# Patient Record
Sex: FEMALE | Race: WHITE | NOT HISPANIC OR LATINO | Employment: OTHER | URBAN - METROPOLITAN AREA
[De-identification: names, ages, dates, MRNs, and addresses within clinical notes are randomized per-mention and may not be internally consistent; named-entity substitution may affect disease eponyms.]

---

## 2017-07-19 ENCOUNTER — CONVERSION ENCOUNTER (OUTPATIENT)
Dept: MAMMOGRAPHY | Facility: HOSPITAL | Age: 72
End: 2017-07-19

## 2018-02-27 ENCOUNTER — OFFICE VISIT CONVERTED (OUTPATIENT)
Dept: FAMILY MEDICINE CLINIC | Facility: CLINIC | Age: 73
End: 2018-02-27
Attending: NURSE PRACTITIONER

## 2018-06-12 ENCOUNTER — CONVERSION ENCOUNTER (OUTPATIENT)
Dept: ORTHOPEDIC SURGERY | Facility: CLINIC | Age: 73
End: 2018-06-12

## 2018-06-12 ENCOUNTER — OFFICE VISIT CONVERTED (OUTPATIENT)
Dept: ORTHOPEDIC SURGERY | Facility: CLINIC | Age: 73
End: 2018-06-12
Attending: PHYSICIAN ASSISTANT

## 2018-07-10 ENCOUNTER — OFFICE VISIT CONVERTED (OUTPATIENT)
Dept: FAMILY MEDICINE CLINIC | Facility: CLINIC | Age: 73
End: 2018-07-10
Attending: NURSE PRACTITIONER

## 2018-08-09 ENCOUNTER — OFFICE VISIT CONVERTED (OUTPATIENT)
Dept: FAMILY MEDICINE CLINIC | Facility: CLINIC | Age: 73
End: 2018-08-09
Attending: NURSE PRACTITIONER

## 2018-08-09 ENCOUNTER — CONVERSION ENCOUNTER (OUTPATIENT)
Dept: FAMILY MEDICINE CLINIC | Facility: CLINIC | Age: 73
End: 2018-08-09

## 2018-10-25 ENCOUNTER — CONVERSION ENCOUNTER (OUTPATIENT)
Dept: MAMMOGRAPHY | Facility: HOSPITAL | Age: 73
End: 2018-10-25

## 2019-05-28 ENCOUNTER — HOSPITAL ENCOUNTER (OUTPATIENT)
Dept: FAMILY MEDICINE CLINIC | Facility: CLINIC | Age: 74
Discharge: HOME | End: 2019-05-28
Attending: NURSE PRACTITIONER

## 2019-05-28 ENCOUNTER — OFFICE VISIT CONVERTED (OUTPATIENT)
Dept: FAMILY MEDICINE CLINIC | Facility: CLINIC | Age: 74
End: 2019-05-28
Attending: NURSE PRACTITIONER

## 2019-05-28 LAB
ALBUMIN SERPL-MCNC: 4.4 G/DL (ref 3.5–5)
ALBUMIN/GLOB SERPL: 1.6 {RATIO} (ref 1.4–2.6)
ALP SERPL-CCNC: 75 U/L (ref 43–160)
ALT SERPL-CCNC: 19 U/L (ref 10–40)
ANION GAP SERPL CALC-SCNC: 20 MMOL/L (ref 8–19)
AST SERPL-CCNC: 23 U/L (ref 15–50)
BASOPHILS # BLD AUTO: 0.05 10*3/UL (ref 0–0.2)
BASOPHILS NFR BLD AUTO: 0.6 % (ref 0–3)
BILIRUB SERPL-MCNC: 0.43 MG/DL (ref 0.2–1.3)
BUN SERPL-MCNC: 17 MG/DL (ref 5–25)
BUN/CREAT SERPL: 22 {RATIO} (ref 6–20)
CALCIUM SERPL-MCNC: 9.3 MG/DL (ref 8.7–10.4)
CHLORIDE SERPL-SCNC: 103 MMOL/L (ref 99–111)
CHOLEST SERPL-MCNC: 174 MG/DL (ref 107–200)
CHOLEST/HDLC SERPL: 3.6 {RATIO} (ref 3–6)
CONV ABS IMM GRAN: 0.03 10*3/UL (ref 0–0.2)
CONV CO2: 23 MMOL/L (ref 22–32)
CONV IMMATURE GRAN: 0.4 % (ref 0–1.8)
CONV TOTAL PROTEIN: 7.1 G/DL (ref 6.3–8.2)
CREAT UR-MCNC: 0.79 MG/DL (ref 0.5–0.9)
DEPRECATED RDW RBC AUTO: 39.8 FL (ref 36.4–46.3)
EOSINOPHIL # BLD AUTO: 0.28 10*3/UL (ref 0–0.7)
EOSINOPHIL # BLD AUTO: 3.5 % (ref 0–7)
ERYTHROCYTE [DISTWIDTH] IN BLOOD BY AUTOMATED COUNT: 12 % (ref 11.7–14.4)
GFR SERPLBLD BASED ON 1.73 SQ M-ARVRAT: >60 ML/MIN/{1.73_M2}
GLOBULIN UR ELPH-MCNC: 2.7 G/DL (ref 2–3.5)
GLUCOSE SERPL-MCNC: 104 MG/DL (ref 65–99)
HBA1C MFR BLD: 14.1 G/DL (ref 12–16)
HCT VFR BLD AUTO: 43.3 % (ref 37–47)
HDLC SERPL-MCNC: 48 MG/DL (ref 40–60)
LDLC SERPL CALC-MCNC: 102 MG/DL (ref 70–100)
LYMPHOCYTES # BLD AUTO: 2.76 10*3/UL (ref 1–5)
MCH RBC QN AUTO: 29.3 PG (ref 27–31)
MCHC RBC AUTO-ENTMCNC: 32.6 G/DL (ref 33–37)
MCV RBC AUTO: 90 FL (ref 81–99)
MONOCYTES # BLD AUTO: 0.59 10*3/UL (ref 0.2–1.2)
MONOCYTES NFR BLD AUTO: 7.3 % (ref 3–10)
NEUTROPHILS # BLD AUTO: 4.37 10*3/UL (ref 2–8)
NEUTROPHILS NFR BLD AUTO: 54 % (ref 30–85)
NRBC CBCN: 0 % (ref 0–0.7)
OSMOLALITY SERPL CALC.SUM OF ELEC: 296 MOSM/KG (ref 273–304)
PLATELET # BLD AUTO: 232 10*3/UL (ref 130–400)
PMV BLD AUTO: 11.4 FL (ref 9.4–12.3)
POTASSIUM SERPL-SCNC: 4.1 MMOL/L (ref 3.5–5.3)
RBC # BLD AUTO: 4.81 10*6/UL (ref 4.2–5.4)
SODIUM SERPL-SCNC: 142 MMOL/L (ref 135–147)
TRIGL SERPL-MCNC: 122 MG/DL (ref 40–150)
VARIANT LYMPHS NFR BLD MANUAL: 34.2 % (ref 20–45)
VLDLC SERPL-MCNC: 24 MG/DL (ref 5–37)
WBC # BLD AUTO: 8.08 10*3/UL (ref 4.8–10.8)

## 2019-06-10 ENCOUNTER — OFFICE VISIT CONVERTED (OUTPATIENT)
Dept: FAMILY MEDICINE CLINIC | Facility: CLINIC | Age: 74
End: 2019-06-10
Attending: NURSE PRACTITIONER

## 2019-06-10 ENCOUNTER — CONVERSION ENCOUNTER (OUTPATIENT)
Dept: FAMILY MEDICINE CLINIC | Facility: CLINIC | Age: 74
End: 2019-06-10

## 2019-06-24 ENCOUNTER — HOSPITAL ENCOUNTER (OUTPATIENT)
Dept: URGENT CARE | Facility: CLINIC | Age: 74
Discharge: HOME | End: 2019-06-24
Attending: FAMILY MEDICINE

## 2019-06-27 LAB
AMOXICILLIN+CLAV SUSC ISLT: 4
AMPICILLIN SUSC ISLT: >=32
AMPICILLIN+SULBAC SUSC ISLT: >=32
BACTERIA UR CULT: ABNORMAL
CEFAZOLIN SUSC ISLT: <=4
CEFEPIME SUSC ISLT: <=1
CEFTAZIDIME SUSC ISLT: <=1
CEFTRIAXONE SUSC ISLT: <=1
CEFUROXIME ORAL SUSC ISLT: 16
CEFUROXIME PARENTER SUSC ISLT: 16
CIPROFLOXACIN SUSC ISLT: <=0.25
ERTAPENEM SUSC ISLT: <=0.5
GENTAMICIN SUSC ISLT: <=1
LEVOFLOXACIN SUSC ISLT: <=0.12
NITROFURANTOIN SUSC ISLT: <=16
TETRACYCLINE SUSC ISLT: 2
TMP SMX SUSC ISLT: >=320
TOBRAMYCIN SUSC ISLT: <=1

## 2019-07-09 ENCOUNTER — OFFICE VISIT CONVERTED (OUTPATIENT)
Dept: FAMILY MEDICINE CLINIC | Facility: CLINIC | Age: 74
End: 2019-07-09
Attending: NURSE PRACTITIONER

## 2019-07-10 ENCOUNTER — HOSPITAL ENCOUNTER (OUTPATIENT)
Dept: FAMILY MEDICINE CLINIC | Facility: CLINIC | Age: 74
Discharge: HOME | End: 2019-07-10
Attending: NURSE PRACTITIONER

## 2019-07-10 LAB
APPEARANCE UR: CLEAR
BILIRUB UR QL: NEGATIVE
COLOR UR: YELLOW
CONV BACTERIA: NEGATIVE
CONV COLLECTION SOURCE (UA): ABNORMAL
CONV UROBILINOGEN IN URINE BY AUTOMATED TEST STRIP: 0.2 {EHRLICHU}/DL (ref 0.1–1)
GLUCOSE UR QL: NEGATIVE MG/DL
HGB UR QL STRIP: NEGATIVE
KETONES UR QL STRIP: NEGATIVE MG/DL
LEUKOCYTE ESTERASE UR QL STRIP: ABNORMAL
NITRITE UR QL STRIP: NEGATIVE
PH UR STRIP.AUTO: 5.5 [PH] (ref 5–8)
PROT UR QL: NEGATIVE MG/DL
RBC #/AREA URNS HPF: ABNORMAL /[HPF]
SP GR UR: 1.01 (ref 1–1.03)
WBC #/AREA URNS HPF: ABNORMAL /[HPF]

## 2019-07-15 ENCOUNTER — OFFICE VISIT CONVERTED (OUTPATIENT)
Dept: FAMILY MEDICINE CLINIC | Facility: CLINIC | Age: 74
End: 2019-07-15
Attending: NURSE PRACTITIONER

## 2019-07-18 ENCOUNTER — HOSPITAL ENCOUNTER (OUTPATIENT)
Dept: GENERAL RADIOLOGY | Facility: HOSPITAL | Age: 74
Discharge: HOME | End: 2019-07-18
Attending: NURSE PRACTITIONER

## 2019-07-22 ENCOUNTER — OFFICE VISIT CONVERTED (OUTPATIENT)
Dept: FAMILY MEDICINE CLINIC | Facility: CLINIC | Age: 74
End: 2019-07-22
Attending: NURSE PRACTITIONER

## 2019-09-12 ENCOUNTER — OFFICE VISIT CONVERTED (OUTPATIENT)
Dept: ORTHOPEDIC SURGERY | Facility: CLINIC | Age: 74
End: 2019-09-12
Attending: ORTHOPAEDIC SURGERY

## 2019-09-12 ENCOUNTER — CONVERSION ENCOUNTER (OUTPATIENT)
Dept: ORTHOPEDIC SURGERY | Facility: CLINIC | Age: 74
End: 2019-09-12

## 2019-10-15 ENCOUNTER — HOSPITAL ENCOUNTER (OUTPATIENT)
Dept: FAMILY MEDICINE CLINIC | Facility: CLINIC | Age: 74
Discharge: HOME | End: 2019-10-15
Attending: NURSE PRACTITIONER

## 2019-10-15 ENCOUNTER — OFFICE VISIT CONVERTED (OUTPATIENT)
Dept: FAMILY MEDICINE CLINIC | Facility: CLINIC | Age: 74
End: 2019-10-15
Attending: NURSE PRACTITIONER

## 2019-10-15 LAB
ALBUMIN SERPL-MCNC: 4.8 G/DL (ref 3.5–5)
ALBUMIN/GLOB SERPL: 1.6 {RATIO} (ref 1.4–2.6)
ALP SERPL-CCNC: 82 U/L (ref 43–160)
ALT SERPL-CCNC: 21 U/L (ref 10–40)
ANION GAP SERPL CALC-SCNC: 21 MMOL/L (ref 8–19)
AST SERPL-CCNC: 24 U/L (ref 15–50)
BASOPHILS # BLD AUTO: 0.05 10*3/UL (ref 0–0.2)
BASOPHILS NFR BLD AUTO: 0.5 % (ref 0–3)
BILIRUB SERPL-MCNC: 0.38 MG/DL (ref 0.2–1.3)
BUN SERPL-MCNC: 12 MG/DL (ref 5–25)
BUN/CREAT SERPL: 19 {RATIO} (ref 6–20)
CALCIUM SERPL-MCNC: 10 MG/DL (ref 8.7–10.4)
CHLORIDE SERPL-SCNC: 98 MMOL/L (ref 99–111)
CHOLEST SERPL-MCNC: 195 MG/DL (ref 107–200)
CHOLEST/HDLC SERPL: 3.9 {RATIO} (ref 3–6)
CONV ABS IMM GRAN: 0.05 10*3/UL (ref 0–0.2)
CONV CO2: 25 MMOL/L (ref 22–32)
CONV IMMATURE GRAN: 0.5 % (ref 0–1.8)
CONV TOTAL PROTEIN: 7.8 G/DL (ref 6.3–8.2)
CREAT UR-MCNC: 0.64 MG/DL (ref 0.5–0.9)
DEPRECATED RDW RBC AUTO: 40.5 FL (ref 36.4–46.3)
EOSINOPHIL # BLD AUTO: 0.2 10*3/UL (ref 0–0.7)
EOSINOPHIL # BLD AUTO: 2.2 % (ref 0–7)
ERYTHROCYTE [DISTWIDTH] IN BLOOD BY AUTOMATED COUNT: 12.6 % (ref 11.7–14.4)
GFR SERPLBLD BASED ON 1.73 SQ M-ARVRAT: >60 ML/MIN/{1.73_M2}
GLOBULIN UR ELPH-MCNC: 3 G/DL (ref 2–3.5)
GLUCOSE SERPL-MCNC: 92 MG/DL (ref 65–99)
HCT VFR BLD AUTO: 43 % (ref 37–47)
HDLC SERPL-MCNC: 50 MG/DL (ref 40–60)
HGB BLD-MCNC: 14.4 G/DL (ref 12–16)
LDLC SERPL CALC-MCNC: 100 MG/DL (ref 70–100)
LYMPHOCYTES # BLD AUTO: 3 10*3/UL (ref 1–5)
LYMPHOCYTES NFR BLD AUTO: 32.9 % (ref 20–45)
MCH RBC QN AUTO: 29.6 PG (ref 27–31)
MCHC RBC AUTO-ENTMCNC: 33.5 G/DL (ref 33–37)
MCV RBC AUTO: 88.3 FL (ref 81–99)
MONOCYTES # BLD AUTO: 0.58 10*3/UL (ref 0.2–1.2)
MONOCYTES NFR BLD AUTO: 6.4 % (ref 3–10)
NEUTROPHILS # BLD AUTO: 5.24 10*3/UL (ref 2–8)
NEUTROPHILS NFR BLD AUTO: 57.5 % (ref 30–85)
NRBC CBCN: 0 % (ref 0–0.7)
OSMOLALITY SERPL CALC.SUM OF ELEC: 289 MOSM/KG (ref 273–304)
PLATELET # BLD AUTO: 268 10*3/UL (ref 130–400)
PMV BLD AUTO: 10.7 FL (ref 9.4–12.3)
POTASSIUM SERPL-SCNC: 4.1 MMOL/L (ref 3.5–5.3)
RBC # BLD AUTO: 4.87 10*6/UL (ref 4.2–5.4)
SODIUM SERPL-SCNC: 140 MMOL/L (ref 135–147)
T4 FREE SERPL-MCNC: 1.1 NG/DL (ref 0.9–1.8)
TRIGL SERPL-MCNC: 225 MG/DL (ref 40–150)
TSH SERPL-ACNC: 1.81 M[IU]/L (ref 0.27–4.2)
VLDLC SERPL-MCNC: 45 MG/DL (ref 5–37)
WBC # BLD AUTO: 9.12 10*3/UL (ref 4.8–10.8)

## 2019-10-16 LAB
CONV HEPATITIS C AB WITH REFLEX TO CONFIRMATION: 0.1 S/CO RATIO (ref 0–0.9)
CONV HEPATITIS COMMENT: NORMAL

## 2020-04-23 ENCOUNTER — TELEPHONE CONVERTED (OUTPATIENT)
Dept: FAMILY MEDICINE CLINIC | Facility: CLINIC | Age: 75
End: 2020-04-23
Attending: NURSE PRACTITIONER

## 2020-05-01 ENCOUNTER — HOSPITAL ENCOUNTER (OUTPATIENT)
Dept: FAMILY MEDICINE CLINIC | Facility: CLINIC | Age: 75
Discharge: HOME | End: 2020-05-01
Attending: NURSE PRACTITIONER

## 2020-05-01 LAB
ALBUMIN SERPL-MCNC: 4.4 G/DL (ref 3.5–5)
ALBUMIN/GLOB SERPL: 1.6 {RATIO} (ref 1.4–2.6)
ALP SERPL-CCNC: 71 U/L (ref 43–160)
ALT SERPL-CCNC: 18 U/L (ref 10–40)
ANION GAP SERPL CALC-SCNC: 20 MMOL/L (ref 8–19)
AST SERPL-CCNC: 20 U/L (ref 15–50)
BASOPHILS # BLD AUTO: 0.04 10*3/UL (ref 0–0.2)
BASOPHILS NFR BLD AUTO: 0.5 % (ref 0–3)
BILIRUB SERPL-MCNC: 0.38 MG/DL (ref 0.2–1.3)
BUN SERPL-MCNC: 17 MG/DL (ref 5–25)
BUN/CREAT SERPL: 28 {RATIO} (ref 6–20)
CALCIUM SERPL-MCNC: 9.8 MG/DL (ref 8.7–10.4)
CHLORIDE SERPL-SCNC: 102 MMOL/L (ref 99–111)
CHOLEST SERPL-MCNC: 170 MG/DL (ref 107–200)
CHOLEST/HDLC SERPL: 3.1 {RATIO} (ref 3–6)
CONV ABS IMM GRAN: 0.04 10*3/UL (ref 0–0.2)
CONV CO2: 24 MMOL/L (ref 22–32)
CONV IMMATURE GRAN: 0.5 % (ref 0–1.8)
CONV TOTAL PROTEIN: 7.2 G/DL (ref 6.3–8.2)
CREAT UR-MCNC: 0.61 MG/DL (ref 0.5–0.9)
DEPRECATED RDW RBC AUTO: 40.5 FL (ref 36.4–46.3)
EOSINOPHIL # BLD AUTO: 0.28 10*3/UL (ref 0–0.7)
EOSINOPHIL # BLD AUTO: 3.6 % (ref 0–7)
ERYTHROCYTE [DISTWIDTH] IN BLOOD BY AUTOMATED COUNT: 12.2 % (ref 11.7–14.4)
GFR SERPLBLD BASED ON 1.73 SQ M-ARVRAT: >60 ML/MIN/{1.73_M2}
GLOBULIN UR ELPH-MCNC: 2.8 G/DL (ref 2–3.5)
GLUCOSE SERPL-MCNC: 107 MG/DL (ref 65–99)
HCT VFR BLD AUTO: 42.4 % (ref 37–47)
HDLC SERPL-MCNC: 54 MG/DL (ref 40–60)
HGB BLD-MCNC: 13.9 G/DL (ref 12–16)
LDLC SERPL CALC-MCNC: 94 MG/DL (ref 70–100)
LYMPHOCYTES # BLD AUTO: 2.29 10*3/UL (ref 1–5)
LYMPHOCYTES NFR BLD AUTO: 29.2 % (ref 20–45)
MCH RBC QN AUTO: 29.3 PG (ref 27–31)
MCHC RBC AUTO-ENTMCNC: 32.8 G/DL (ref 33–37)
MCV RBC AUTO: 89.5 FL (ref 81–99)
MONOCYTES # BLD AUTO: 0.6 10*3/UL (ref 0.2–1.2)
MONOCYTES NFR BLD AUTO: 7.7 % (ref 3–10)
NEUTROPHILS # BLD AUTO: 4.58 10*3/UL (ref 2–8)
NEUTROPHILS NFR BLD AUTO: 58.5 % (ref 30–85)
NRBC CBCN: 0 % (ref 0–0.7)
OSMOLALITY SERPL CALC.SUM OF ELEC: 296 MOSM/KG (ref 273–304)
PLATELET # BLD AUTO: 251 10*3/UL (ref 130–400)
PMV BLD AUTO: 11 FL (ref 9.4–12.3)
POTASSIUM SERPL-SCNC: 4 MMOL/L (ref 3.5–5.3)
RBC # BLD AUTO: 4.74 10*6/UL (ref 4.2–5.4)
SODIUM SERPL-SCNC: 142 MMOL/L (ref 135–147)
TRIGL SERPL-MCNC: 110 MG/DL (ref 40–150)
VLDLC SERPL-MCNC: 22 MG/DL (ref 5–37)
WBC # BLD AUTO: 7.83 10*3/UL (ref 4.8–10.8)

## 2020-05-12 ENCOUNTER — OFFICE VISIT CONVERTED (OUTPATIENT)
Dept: ORTHOPEDIC SURGERY | Facility: CLINIC | Age: 75
End: 2020-05-12
Attending: PHYSICIAN ASSISTANT

## 2020-06-24 ENCOUNTER — HOSPITAL ENCOUNTER (OUTPATIENT)
Dept: GENERAL RADIOLOGY | Facility: HOSPITAL | Age: 75
Discharge: HOME | End: 2020-06-24
Attending: NURSE PRACTITIONER

## 2020-06-30 LAB — SARS-COV-2 RNA SPEC QL NAA+PROBE: NOT DETECTED

## 2020-07-01 ENCOUNTER — HOSPITAL ENCOUNTER (OUTPATIENT)
Dept: PERIOP | Facility: HOSPITAL | Age: 75
Setting detail: HOSPITAL OUTPATIENT SURGERY
Discharge: HOME | End: 2020-07-01
Attending: ORTHOPAEDIC SURGERY

## 2020-07-08 ENCOUNTER — HOSPITAL ENCOUNTER (OUTPATIENT)
Dept: URGENT CARE | Facility: CLINIC | Age: 75
Discharge: HOME | End: 2020-07-08

## 2020-07-11 LAB
AMOXICILLIN+CLAV SUSC ISLT: 4
AMPICILLIN SUSC ISLT: 8
AMPICILLIN+SULBAC SUSC ISLT: 4
BACTERIA UR CULT: ABNORMAL
CEFAZOLIN SUSC ISLT: <=4
CEFEPIME SUSC ISLT: <=1
CEFTAZIDIME SUSC ISLT: <=1
CEFTRIAXONE SUSC ISLT: <=1
CEFUROXIME ORAL SUSC ISLT: 16
CEFUROXIME PARENTER SUSC ISLT: 16
CIPROFLOXACIN SUSC ISLT: 1
ERTAPENEM SUSC ISLT: <=0.5
GENTAMICIN SUSC ISLT: <=1
LEVOFLOXACIN SUSC ISLT: 1
NITROFURANTOIN SUSC ISLT: <=16
TETRACYCLINE SUSC ISLT: <=1
TMP SMX SUSC ISLT: <=20
TOBRAMYCIN SUSC ISLT: <=1

## 2020-07-16 ENCOUNTER — OFFICE VISIT CONVERTED (OUTPATIENT)
Dept: ORTHOPEDIC SURGERY | Facility: CLINIC | Age: 75
End: 2020-07-16
Attending: PHYSICIAN ASSISTANT

## 2020-07-16 ENCOUNTER — CONVERSION ENCOUNTER (OUTPATIENT)
Dept: ORTHOPEDIC SURGERY | Facility: CLINIC | Age: 75
End: 2020-07-16

## 2020-08-18 ENCOUNTER — OFFICE VISIT CONVERTED (OUTPATIENT)
Dept: ORTHOPEDIC SURGERY | Facility: CLINIC | Age: 75
End: 2020-08-18
Attending: PHYSICIAN ASSISTANT

## 2020-09-18 ENCOUNTER — OFFICE VISIT CONVERTED (OUTPATIENT)
Dept: ORTHOPEDIC SURGERY | Facility: CLINIC | Age: 75
End: 2020-09-18
Attending: PHYSICIAN ASSISTANT

## 2020-10-08 ENCOUNTER — HOSPITAL ENCOUNTER (OUTPATIENT)
Dept: FAMILY MEDICINE CLINIC | Facility: CLINIC | Age: 75
Discharge: HOME | End: 2020-10-08
Attending: NURSE PRACTITIONER

## 2020-10-08 LAB
ALBUMIN SERPL-MCNC: 4.3 G/DL (ref 3.5–5)
ALBUMIN/GLOB SERPL: 1.5 {RATIO} (ref 1.4–2.6)
ALP SERPL-CCNC: 85 U/L (ref 43–160)
ALT SERPL-CCNC: 18 U/L (ref 10–40)
ANION GAP SERPL CALC-SCNC: 16 MMOL/L (ref 8–19)
AST SERPL-CCNC: 19 U/L (ref 15–50)
BASOPHILS # BLD AUTO: 0.07 10*3/UL (ref 0–0.2)
BASOPHILS NFR BLD AUTO: 0.6 % (ref 0–3)
BILIRUB SERPL-MCNC: 0.55 MG/DL (ref 0.2–1.3)
BUN SERPL-MCNC: 14 MG/DL (ref 5–25)
BUN/CREAT SERPL: 18 {RATIO} (ref 6–20)
CALCIUM SERPL-MCNC: 10.1 MG/DL (ref 8.7–10.4)
CHLORIDE SERPL-SCNC: 103 MMOL/L (ref 99–111)
CHOLEST SERPL-MCNC: 183 MG/DL (ref 107–200)
CHOLEST/HDLC SERPL: 2.8 {RATIO} (ref 3–6)
CONV ABS IMM GRAN: 0.05 10*3/UL (ref 0–0.2)
CONV CO2: 24 MMOL/L (ref 22–32)
CONV IMMATURE GRAN: 0.4 % (ref 0–1.8)
CONV TOTAL PROTEIN: 7.2 G/DL (ref 6.3–8.2)
CREAT UR-MCNC: 0.77 MG/DL (ref 0.5–0.9)
DEPRECATED RDW RBC AUTO: 41.3 FL (ref 36.4–46.3)
EOSINOPHIL # BLD AUTO: 0.18 10*3/UL (ref 0–0.7)
EOSINOPHIL # BLD AUTO: 1.6 % (ref 0–7)
ERYTHROCYTE [DISTWIDTH] IN BLOOD BY AUTOMATED COUNT: 12.5 % (ref 11.7–14.4)
GFR SERPLBLD BASED ON 1.73 SQ M-ARVRAT: >60 ML/MIN/{1.73_M2}
GLOBULIN UR ELPH-MCNC: 2.9 G/DL (ref 2–3.5)
GLUCOSE SERPL-MCNC: 98 MG/DL (ref 65–99)
HCT VFR BLD AUTO: 44.5 % (ref 37–47)
HDLC SERPL-MCNC: 65 MG/DL (ref 40–60)
HGB BLD-MCNC: 14.5 G/DL (ref 12–16)
LDLC SERPL CALC-MCNC: 97 MG/DL (ref 70–100)
LYMPHOCYTES # BLD AUTO: 2.74 10*3/UL (ref 1–5)
LYMPHOCYTES NFR BLD AUTO: 24.3 % (ref 20–45)
MCH RBC QN AUTO: 29.4 PG (ref 27–31)
MCHC RBC AUTO-ENTMCNC: 32.6 G/DL (ref 33–37)
MCV RBC AUTO: 90.3 FL (ref 81–99)
MONOCYTES # BLD AUTO: 0.72 10*3/UL (ref 0.2–1.2)
MONOCYTES NFR BLD AUTO: 6.4 % (ref 3–10)
NEUTROPHILS # BLD AUTO: 7.53 10*3/UL (ref 2–8)
NEUTROPHILS NFR BLD AUTO: 66.7 % (ref 30–85)
NRBC CBCN: 0 % (ref 0–0.7)
OSMOLALITY SERPL CALC.SUM OF ELEC: 288 MOSM/KG (ref 273–304)
PLATELET # BLD AUTO: 255 10*3/UL (ref 130–400)
PMV BLD AUTO: 10.8 FL (ref 9.4–12.3)
POTASSIUM SERPL-SCNC: 4.2 MMOL/L (ref 3.5–5.3)
RBC # BLD AUTO: 4.93 10*6/UL (ref 4.2–5.4)
SODIUM SERPL-SCNC: 139 MMOL/L (ref 135–147)
TRIGL SERPL-MCNC: 107 MG/DL (ref 40–150)
TSH SERPL-ACNC: 2.14 M[IU]/L (ref 0.27–4.2)
VLDLC SERPL-MCNC: 21 MG/DL (ref 5–37)
WBC # BLD AUTO: 11.29 10*3/UL (ref 4.8–10.8)

## 2020-10-21 ENCOUNTER — CONVERSION ENCOUNTER (OUTPATIENT)
Dept: FAMILY MEDICINE CLINIC | Facility: CLINIC | Age: 75
End: 2020-10-21

## 2020-10-21 ENCOUNTER — OFFICE VISIT CONVERTED (OUTPATIENT)
Dept: FAMILY MEDICINE CLINIC | Facility: CLINIC | Age: 75
End: 2020-10-21
Attending: NURSE PRACTITIONER

## 2020-10-29 ENCOUNTER — CONVERSION ENCOUNTER (OUTPATIENT)
Dept: FAMILY MEDICINE CLINIC | Facility: CLINIC | Age: 75
End: 2020-10-29

## 2020-10-31 ENCOUNTER — HOSPITAL ENCOUNTER (OUTPATIENT)
Dept: URGENT CARE | Facility: CLINIC | Age: 75
Discharge: HOME | End: 2020-10-31

## 2020-11-02 ENCOUNTER — OFFICE VISIT CONVERTED (OUTPATIENT)
Dept: FAMILY MEDICINE CLINIC | Facility: CLINIC | Age: 75
End: 2020-11-02
Attending: NURSE PRACTITIONER

## 2020-11-02 ENCOUNTER — HOSPITAL ENCOUNTER (OUTPATIENT)
Dept: FAMILY MEDICINE CLINIC | Facility: CLINIC | Age: 75
Discharge: HOME | End: 2020-11-02
Attending: NURSE PRACTITIONER

## 2020-11-04 ENCOUNTER — CONVERSION ENCOUNTER (OUTPATIENT)
Dept: FAMILY MEDICINE CLINIC | Facility: CLINIC | Age: 75
End: 2020-11-04

## 2020-11-04 LAB — BACTERIA UR CULT: NORMAL

## 2020-11-25 ENCOUNTER — PATIENT OUTREACH - CONVERTED (OUTPATIENT)
Dept: FAMILY MEDICINE CLINIC | Facility: CLINIC | Age: 75
End: 2020-11-25
Attending: NURSE PRACTITIONER

## 2020-12-31 ENCOUNTER — PATIENT OUTREACH - CONVERTED (OUTPATIENT)
Dept: FAMILY MEDICINE CLINIC | Facility: CLINIC | Age: 75
End: 2020-12-31
Attending: NURSE PRACTITIONER

## 2021-01-11 ENCOUNTER — TELEPHONE CONVERTED (OUTPATIENT)
Dept: FAMILY MEDICINE CLINIC | Facility: CLINIC | Age: 76
End: 2021-01-11
Attending: NURSE PRACTITIONER

## 2021-01-26 ENCOUNTER — PATIENT OUTREACH - CONVERTED (OUTPATIENT)
Dept: FAMILY MEDICINE CLINIC | Facility: CLINIC | Age: 76
End: 2021-01-26
Attending: NURSE PRACTITIONER

## 2021-02-25 ENCOUNTER — PATIENT OUTREACH - CONVERTED (OUTPATIENT)
Dept: FAMILY MEDICINE CLINIC | Facility: CLINIC | Age: 76
End: 2021-02-25
Attending: NURSE PRACTITIONER

## 2021-03-31 ENCOUNTER — PATIENT OUTREACH - CONVERTED (OUTPATIENT)
Dept: FAMILY MEDICINE CLINIC | Facility: CLINIC | Age: 76
End: 2021-03-31
Attending: NURSE PRACTITIONER

## 2021-04-22 ENCOUNTER — CONVERSION ENCOUNTER (OUTPATIENT)
Dept: ORTHOPEDIC SURGERY | Facility: CLINIC | Age: 76
End: 2021-04-22

## 2021-04-22 ENCOUNTER — OFFICE VISIT CONVERTED (OUTPATIENT)
Dept: ORTHOPEDIC SURGERY | Facility: CLINIC | Age: 76
End: 2021-04-22
Attending: ORTHOPAEDIC SURGERY

## 2021-04-30 ENCOUNTER — PATIENT OUTREACH - CONVERTED (OUTPATIENT)
Dept: FAMILY MEDICINE CLINIC | Facility: CLINIC | Age: 76
End: 2021-04-30
Attending: NURSE PRACTITIONER

## 2021-05-05 ENCOUNTER — OFFICE VISIT CONVERTED (OUTPATIENT)
Dept: FAMILY MEDICINE CLINIC | Facility: CLINIC | Age: 76
End: 2021-05-05
Attending: NURSE PRACTITIONER

## 2021-05-05 ENCOUNTER — CONVERSION ENCOUNTER (OUTPATIENT)
Dept: FAMILY MEDICINE CLINIC | Facility: CLINIC | Age: 76
End: 2021-05-05

## 2021-05-10 NOTE — OUTREACH NOTE
Quick Note      Patient Name: Yeimy Yang   Patient ID: [id number]   Sex: Female   YOB: 1945    Primary Care Provider: Anna BETANCOURT   Referring Provider: Anna BETANCOURT    Visit Date: February 25, 2021    Provider: SERAFIN Gross   Location: [facility]   Location Address: [address] [place] [address]  [id number]   Location Phone: [phone]          History Of Present Illness     CCM     TaskID: [id number]    Task Subject: CCM notes February 2021    Task Comments:    Date: Feb 25 2021 12:08PM     Creator: karel  Called patient to inform what PCP said about the whooping cough vaccine, she does not feel they need it but will let me know if they get one so I can update their charts. She is aware to call pharmacy first to be sure it would be covered if they go get them. (6 minutes)    Date: Feb 24 2021  3:26PM     Creator: karel  [facility] Plan of Care and goals updated for the month. (charting 8 minutes)    Date: Feb 24 2021  3:04PM     Creator: karel  Pritned patient orders for lab work for PCP for April and faxed to the PCP in [place], Dr Correa per her request so they do not have to get them done when get home as they are coming in first of April. (6 minutes)    Date: Feb 24 2021  2:54PM     Creator: karel  Task to PCP about the whooping cough vaccine she was asking about due to insurance calling her about it. (2 minutes)    Date: Feb 24 2021  2:53PM     Creator: karel  I recorded her blood pressure readings and scanned for PCP to review in her chart. 114-1120's over 70's to low 80's and heart rate average in 70s.(5 minutes)    Date: Feb 24 2021  2:46PM     Creator: karel  Called patient for CCM call. She is doing well, the weather is in the 80s today in [place] and should get up to 90 by weeks end. She reports she is going to go with Franklyn to get blood work at the doctor in [place] so they do not have to do it at home when they  return in April. She reports her blood pressures are great and she gave me end of Jan and some of February readings. I will scan for PCP to review. Medications reviewed and same. Trying to get the COVID 19 vaccine in [place] at the [facility] but have not been called yet for their appointments. She will try another route. Overall everything is good. Plans to return to [place] first of April and ride with juancho. (8 minutes)    Date: Feb 24 2021  2:40PM     Creator: karel  Chart review for February noted she has follow up with PCP 5/5/2021, there is a note from insurance for review of medication compliance for HCTZ 12.5mg with last fill 9/2020 but she was changed to 25mg and then the [place] doctor stopped the medication 11/18/2020. No call needed back to them. (7 minutes)             Plan  · Medications  o Medications have been Reconciled  o Transition of Care or Provider Policy            Electronically Signed by: Zonia Rivas RN -Author on February 25, 2021 12:12:55 PM

## 2021-05-10 NOTE — OUTREACH NOTE
Quick Note      Patient Name: Yeimy Yang   Patient ID: [id number]   Sex: Female   YOB: 1945    Primary Care Provider: Anna BETANCOURT   Referring Provider: Anna BETANCOURT    Visit Date: November 25, 2020    Provider: SERAFIN Gross   Location: [facility]   Location Address: [address] [place] [address]  [id number]   Location Phone: [phone]          History Of Present Illness  CCM Comprehensive Care Plan    This Chronic Medical Management Care Plan for Yeimy Yang, 75 year old /White female, has been monitored and managed, reviewed, and a new plan of care implemented, and established for the month of November. A cumulative time of 60 minutes was spent on this patient record, including phone call with patient, electronic communication with primary care provider, electronic communication with other providers, and chart review.   Regarding the patient's diagnoses Essential hypertension, Fatigue, Hyperlipidemia, and Anxiety Stroke, the following items were adressed: medical records, medications, and changes to medical care and any changes can be found within the plan section of the note. A detailed listing of time spent for chronic care management has been scanned into the patient's electronic record. Current medications include: amlodipine 2.5 mg oral tablet, Aspir-81 81 mg Oral Tablet, Delayed Release (E.C.), B12 lozenge, cetirizine 10 mg oral tablet, Estrace 0.01 % (0.1 mg/gram) vaginal cream, hydrocortisone acetate 25 mg rectal suppository, losartan 50 mg oral tablet, multivitamin oral tablet, propranolol 20 mg oral tablet, simvastatin 10 mg oral tablet, Singulair 10 mg oral tablet, Vitamin D3 5,000 unit oral tablet, Voltaren 1 % topical gel, and Zoloft 50 mg oral tablet and the patient is reported to be compliant with medication protocol. Medications are reported to be non-effective in controlling symptoms and  changes have been made to the medication protocol. Regarding these diagnoses, patient is currently residing in [place] and will be seeing a PCP down there, notes to be obtained for continuity of care.   The patient was monitored remotely for blood pressure, activity level, and mood and behavior for a period of 11 minutes.   This patient's physical needs are currently being met.   Patient's mental support needs include: continued support. Living in [place] for winter months with her  and currently on Zoloft for good control. Outdoor activities with  with 3 wheel bike rides and protected activities a the [place] winter [place] as COVID 19 pandemic hinders some activities.   The patient's cognitive support needs are currently being met.   The patient's psychosocial support needs include:, need for increased support and coordination of community providers. Nurse Navigator to work with patient, PCP, and new provider in [place] for blood pressure management and continuity of care with notes shared and obtained.   This patient's functional needs include: medication education. Stopped her HCTZ and she ha started Amlodipine, I sent a blood pressure log along with her consent to her home.   This patient's environmental needs are N/A.           Assessment  · Chronic Care Management (CCM)     V68.89/Z02.89  · Essential hypertension     401.9/I10  · Fatigue     780.79/R53.83  · Primary osteoarthritis of left knee     715.16/M17.12  · Hyperlipidemia     272.4/E78.5  · Anxiety     300.00/F41.9  · Stroke     434.91/I63.9      Plan  · Orders  o 2 - CCM:Each additional 20 mins () - V68.89/Z02.89, 300.00/F41.9, 401.9/I10 - 11/18/2020  · Instructions  o Patient's Health Care Goals: To get blood pressure under better control with medication changes, anxiety reduction in [place], and new medicines and to see the new doctor in [place] and keep a blood pressure log. To stay COVID free during pandemic.  o Provider's  Health Care Goals: To keep blood pressure under 140/90 and to keep anxiety under control.  o Patient was provided an electronic copy of care plan  o CCM services were explained and offered and patient has accepted these services.  o Patient has given their written consent to recieve CCM services and understands that this includes the authorization of electronic communication of medical information with other treating providers.  o Patient understands that they may stop CCM services at any time and these changes will be effective at the end of the calendar month and will effectively revocate the agreement of CCM services.  o Patient understands that only one practioner can furnish and be paid for CCM services during one calendar month. Patient also understands that there may be co-payment or deductible fees in association with CCM services.  o Patient will continue with at least monthly follow-up calls with the Nurse Navigator.  · Associate Tasks  o Task ID [id number] CCM: CCM notes November (NEW)            Electronically Signed by: Zonia Rivas RN -Author on November 25, 2020 05:26:30 PM

## 2021-05-10 NOTE — OUTREACH NOTE
Quick Note      Patient Name: Yeimy Yang   Patient ID: [id number]   Sex: Female   YOB: 1945    Primary Care Provider: Anna BETANCOURT   Referring Provider: Anna BETANCOURT    Visit Date: February 25, 2021    Provider: SERAFIN Gross   Location: [facility]   Location Address: [address] [place] [address]  [id number]   Location Phone: [phone]          History Of Present Illness  CCM Comprehensive Care Plan    This Chronic Medical Management Care Plan for Yeimy Yang, 76 year old /White female, has been monitored and managed, reviewed, revised, and and a new plan of care implemented for the month of February. A cumulative time of 43 minutes was spent on this patient record, including face to face with provider, phone call with patient, phone call with other providers, and chart review.   Regarding the patient's diagnoses Essential hypertension, Fatigue, and Hyperlipidemia, the following items were adressed: medical records and medications and any changes can be found within the plan section of the note. A detailed listing of time spent for chronic care management has been scanned into the patient's electronic record. Current medications include: amlodipine 2.5 mg oral tablet, Aspir-81 81 mg Oral Tablet, Delayed Release (E.C.), B12 lozenge, cetirizine 10 mg oral tablet, Estrace 0.01 % (0.1 mg/gram) vaginal cream, hydrocortisone acetate 25 mg rectal suppository, losartan 50 mg oral tablet, multivitamin oral tablet, propranolol 20 mg oral tablet, simvastatin 10 mg oral tablet, Singulair 10 mg oral tablet, Vitamin D3 5,000 unit oral tablet, Voltaren 1 % topical gel, and Zoloft 50 mg oral tablet and the patient is reported to be compliant with medication protocol. Medications are reported to be effective. Regarding these diagnoses, she is currently living in [place] and seeing Dr. Correa and orders sent per patient  request for labs for us.   The patient was monitored remotely for blood pressure and activity level for a period of 6 minutes.   This patient's physical needs are currently being met.   Patient's mental support needs are currently being met.   The patient's cognitive support needs are currently being met.   The patient's psychosocial support needs include:, need for increased support and coordination of community providers. CCM team working with patient and providers in [place] and [place] to get continuity of care with medication updates. Blood pressure readings and lab orders worked on this month.   This patient's functional needs include: health care coverage. Patient needing to know if PCP wants them to get the whooping cough vaccine and she is working on getting their COVID vaccines in [place], will come home to [place] in April.   This patient's environmental needs are N/A.           Assessment  · Chronic Care Management (CCM)     V68.89/Z02.89  · Essential hypertension     401.9/I10  · Fatigue     780.79/R53.83  · Primary osteoarthritis of left knee     715.16/M17.12  · Hyperlipidemia     272.4/E78.5      Plan  · Orders  o Chronic care management, non-complex, extra 20 mins, non-Medicare (87228) - V68.89/Z02.89, 401.9/I10, 272.4/E78.5 - 02/24/2021  · Medications  o Medications have been Reconciled  o Transition of Care or Provider Policy  · Instructions  o Patient's Health Care Goals: To keep blood pressure stable, get my COVID vaccine, have labs done in [place] so we do not have to get in [place] as soon as we get home.   o Provider's Health Care Goals: Take medications as prescribed and keep blood pressure log one more month.  o Patient was provided an electronic copy of care plan  o CCM services were explained and offered and patient has accepted these services.  o Patient has given their written consent to recieve CCM services and understands that this includes the authorization of electronic communication of medical  information with other treating providers.  o Patient understands that they may stop CCM services at any time and these changes will be effective at the end of the calendar month and will effectively revocate the agreement of CCM services.  o Patient understands that only one practioner can furnish and be paid for CCM services during one calendar month. Patient also understands that there may be co-payment or deductible fees in association with CCM services.  o Patient will continue with at least monthly follow-up calls with the Nurse Navigator.  · Associate Tasks  o Task ID [id number] CCM: CCM notes February 2021  o Task ID [id number] Please Review this Document: Please Review: Blood Pressure or Blood Sugar Logs for Yeimy Yang ([id number])            Electronically Signed by: Zonia Rivas RN -Author on February 25, 2021 12:11:57 PM

## 2021-05-10 NOTE — OUTREACH NOTE
Quick Note      Patient Name: Yeimy Yang   Patient ID: [id number]   Sex: Female   YOB: 1945    Primary Care Provider: Anna BETANCOURT   Referring Provider: Anna BETANCOURT    Visit Date: November 25, 2020    Provider: SERAFIN Gross   Location: [facility]   Location Address: [address] [place] [address]  [id number]   Location Phone: [phone]          History Of Present Illness     CCM     TaskID: [id number]    Task Subject: CCM notes November (NEW)    Task Comments:    Date: Nov 18 2020  5:02PM     Creator: karel  CCM plan of care and goals updated and initiated in her chart for this month. (15 minutes charting)    Date: Nov 18 2020  4:49PM     Creator: karel  I updated medication list per discussion with patient, scanned and mailed her consent to her, discussed with PCP and her goals are for blood pressure below 140/90 and good control of anxiety with current measures. (7 minutes)    Date: Nov 18 2020  4:45PM     Creator: karel  (cont) fax our notes to them for continuity of care and she will let me know the contact once he receives the  new patient packet in the mail from them. She reports her health care goal is to control her anxiety and to manage her blood pressure better. She is taking her medications as prescribed and will see the other provider. They are in [place] and that helps with anxiety. Also to stay COVID free. (38 minutes)    Date: Nov 18 2020  4:43PM     Creator: karel  Speaking to patient for her  Franklyn for CCM call this montha nd she reports about her blood pressure issues and needs for PCP in [place]. So we discussed CCM program for her as well. She is aware of the program as Franklyn is current patient. I did review consent with her over phone and she is aware she can stop services at any time. She is consenting for her chronic conditions of arthritis, HTN (currently uncontrolled), fatigue, HLD, and  stroke. I informed her I will mail her consent and welcome letter along with one of my business cards to her at their address, she reports the mail is being forwarded to [place], so that is fine. She reports she has been to the urgent care in [place], [place] since they have gotten their earlier this month on the 6th and her blood pressure was still up. They have added Amlodipine 2.5mg daily and took her off her HCTZ. Her readings have been: 158/105, 162/110, and this am it was 141/88. She has an appointment after Thanksgiving with a PCP down there. I informed her we could                   Electronically Signed by: Zonia Rivas RN -Author on November 25, 2020 05:27:39 PM

## 2021-05-10 NOTE — OUTREACH NOTE
Quick Note      Patient Name: Yeimy Yang   Patient ID: [id number]   Sex: Female   YOB: 1945    Primary Care Provider: Anna BETANCOURT   Referring Provider: Anna BETANCOURT    Visit Date: December 31, 2020    Provider: SERAFIN Gross   Location: [facility]   Location Address: [address] [place], [place] [address]  [place], [place]  [id number]   Location Phone: [phone]          History Of Present Illness     CCM     TaskID: [id number]    Task Subject: CCM notes December 2020    Task Comments:    Date: Dec 31 2020  2:24PM     Creator: karel  So=poke with patient and informed PCP reported her blood pressure medication is at [facility] she just has to have them tf to the store where she is. PCP also asking if Losartan is daily because we have BID and patient confirms the doctor down there changed it and she said [facility] informed her the meds were ready and she will go  and she has a follow up with the doc in [place] on 1/5/2020 and her b/p today was 114/83. I reported in task to PCP here. (8 minutes)    Date: Dec 29 2020 12:54PM     Creator: karel  I called to inform her I sent her husbands financials in for patient assistance yesterday, she verbalizes understanding and is needing refills ASAP on her blood pressure medications from Rhiannon POSADA sent to [facility] for Losartan 50mg and Propranolol 20 mg BID. I will put in task for PCP. (4 minutes)    Date: Dec 22 2020  2:35PM     Creator: karel  I updated CCM plan of care for December and updated patient and provider goals. (charting 8 minutes)    Date: Dec 22 2020  2:20PM     Creator: karel  PCP has reviewed newest blood pressure log an no new orders noted. (2 minutes)    Date: Dec 21 2020  4:00PM     Creator: karel  (cont) have labs here for PCP on te 21st so they are not sure wht to do. I told her they can either get them there and we can request the records or wait to get them here, it is up  to them. She reports he is supposed to get labs on 4/30 for [facility] as well. so they will discuss and let me know. (call 14, documentation of blood pressures 5 , and notes 7 minutes)    Date: Dec 21 2020  3:57PM     Creator: karel  I called patient for CCM call and to check on blood pressure readings, her appointment with new PCP and any medication changes. She reports she has called office to give medication updates already and she has filled out forms for her new PCP in [place] to request records from us and gave them all our information. I do not see any records release information in her chart from them or on the administrative side. She reports her doctor is Campos Correa in [place] and she was him and he wanted her back in 6 weeks after he added a new medication, Amlodipine, but there was no one out at desk to make her appointment. She took her  last week and he said for them to just both come back in April. She is planning to call and get herself an appointment in January. She gave me her blood pressure readings and I put them on a [facility] log for PCP to review. PCP had note in for keeping her posted on her readings. Patient reports they have appointments for 4/7/2021 to see the [place] doc and lab work on 4/5/2021 but they also             Plan  · Medications  o Medications have been Reconciled  o Transition of Care or Provider Policy            Electronically Signed by: Zonia Rivas RN -Author on December 31, 2020 02:26:32 PM

## 2021-05-10 NOTE — OUTREACH NOTE
Quick Note      Patient Name: Yeimy Yang   Patient ID: [id number]   Sex: Female   YOB: 1945    Primary Care Provider: Anna BETANCORUT   Referring Provider: Anna BETANCOURT    Visit Date: December 31, 2020    Provider: SERAFIN Gross   Location: [facility]   Location Address: [address] [place] [address]  [id number]   Location Phone: [phone]          History Of Present Illness  CCM Comprehensive Care Plan    This Chronic Medical Management Care Plan for Yeimy Yang, 75 year old /White female, has been monitored and managed, reviewed, revised, and and a new plan of care implemented for the month of December. A cumulative time of 48 minutes was spent on this patient record, including face to face with provider, phone call with patient, and chart review.   Regarding the patient's diagnoses Essential hypertension, Fatigue, Hyperlipidemia, and Primary osteoarthritis of left knee, the following items were adressed: medical records, medications, and changes to medical care and any changes can be found within the plan section of the note. A detailed listing of time spent for chronic care management has been scanned into the patient's electronic record. Current medications include: amlodipine 2.5 mg oral tablet, Aspir-81 81 mg Oral Tablet, Delayed Release (E.C.), B12 lozenge, cetirizine 10 mg oral tablet, Estrace 0.01 % (0.1 mg/gram) vaginal cream, hydrocortisone acetate 25 mg rectal suppository, losartan 50 mg oral tablet, multivitamin oral tablet, propranolol 20 mg oral tablet, simvastatin 10 mg oral tablet, Singulair 10 mg oral tablet, Vitamin D3 5,000 unit oral tablet, Voltaren 1 % topical gel, and Zoloft 50 mg oral tablet and the patient is reported to be compliant with medication protocol. Medications are reported to be effective. Regarding these diagnoses, she is seeing a new PCP while in [place], Dr. Correa and  some medication changes were made, updated PCP.   The patient was monitored remotely for blood pressure and activity level for a period of 12 minutes.   This patient's physical needs are currently being met.   Patient's mental support needs include: continued support. Remains in [place] winter home with her  and doing well, weather in 60s this month and no rain. Seeing new PCP for HTN and keeping blood pressures monitored.   The patient's cognitive support needs are currently being met.   The patient's psychosocial support needs include:, continued support and coordination of community providers. Patient reports she has filled out paperwork for Dr. Correa to get records from our office and we will get from him once she comes home in April. Blood pressure record obtained and shared with PCP, overall stable.   This patient's functional needs are N/A.   This patient's environmental needs are N/A.           Assessment  · Chronic Care Management (CCM)     V68.89/Z02.89  · Essential hypertension     401.9/I10  · Fatigue     780.79/R53.83  · Primary osteoarthritis of left knee     715.16/M17.12  · Hyperlipidemia     272.4/E78.5      Plan  · Orders  o CCM:Each additional 20 mins () - V68.89/Z02.89, 401.9/I10, 272.4/E78.5 - 12/22/2020  · Medications  o Medications have been Reconciled  o Transition of Care or Provider Policy  · Instructions  o Patient's Health Care Goals: To keep a record of blood pressures and keep it where Anna wants it to be and to see new doctor and manage medicines in [place]. Will get blood work with Anna once we get home in April.  o Provider's Health Care Goals: Continue blood pressure logs and follow up with nurse navigator to report. Goal less than 140/90.  o Patient was provided an electronic copy of care plan  o CCM services were explained and offered and patient has accepted these services.  o Patient has given their written consent to recieve CCM services and understands that  this includes the authorization of electronic communication of medical information with other treating providers.  o Patient understands that they may stop CCM services at any time and these changes will be effective at the end of the calendar month and will effectively revocate the agreement of CCM services.  o Patient understands that only one practioner can furnish and be paid for CCM services during one calendar month. Patient also understands that there may be co-payment or deductible fees in association with CCM services.  o Patient will continue with at least monthly follow-up calls with the Nurse Navigator.  · Associate Tasks  o Task ID [id number] CCM: CCM notes December 2020            Electronically Signed by: Zonia Rivas RN -Author on December 31, 2020 02:25:52 PM

## 2021-05-10 NOTE — OUTREACH NOTE
Quick Note      Patient Name: Yeimy Yang   Patient ID: [id number]   Sex: Female   YOB: 1945    Primary Care Provider: Anna BETANCOURT   Referring Provider: Anna BETANCOURT    Visit Date: January 26, 2021    Provider: SERAFIN Gross   Location: [facility]   Location Address: [address] [place] [address]  [id number]   Location Phone: [phone]          History Of Present Illness  CCM Comprehensive Care Plan    This Chronic Medical Management Care Plan for Yeimy Yang, 76 year old /White female, has been monitored and managed for the month of January. A cumulative time of 21 minutes was spent on this patient record, including face to face with provider, phone call with patient, and chart review.   Regarding the patient's diagnoses Essential hypertension, Fatigue, and Hyperlipidemia, the following items were adressed: medical records, medications, and changes to medical care and any changes can be found within the plan section of the note. A detailed listing of time spent for chronic care management has been scanned into the patient's electronic record. Current medications include: amlodipine 2.5 mg oral tablet, Aspir-81 81 mg Oral Tablet, Delayed Release (E.C.), B12 lozenge, cetirizine 10 mg oral tablet, Estrace 0.01 % (0.1 mg/gram) vaginal cream, hydrocortisone acetate 25 mg rectal suppository, losartan 50 mg oral tablet, multivitamin oral tablet, propranolol 20 mg oral tablet, simvastatin 10 mg oral tablet, Singulair 10 mg oral tablet, Vitamin D3 5,000 unit oral tablet, Voltaren 1 % topical gel, and Zoloft 50 mg oral tablet and the patient is reported to be compliant with medication protocol. Medications are reported to be non-effective in controlling symptoms and changes have been made to the medication protocol. Regarding these diagnoses, patient is seeing Dr. Correa in [facility] for PCP until they come  back to [place]   The patient was monitored remotely for blood pressure and activity level for a period of 12 minutes.   This patient's physical needs are currently being met.   Patient's mental support needs are currently being met.   The patient's cognitive support needs are currently being met.   The patient's psychosocial support needs include:, need for increased support and coordination of community providers. Support this month for getting refills needed an for keeping blood pressure log for PCP to review and keeping up with doctor in [place] for PCP to review plan of care and changes.   This patient's functional needs are N/A.   This patient's environmental needs are N/A.           Assessment  · Chronic Care Management (CCM)     V68.89/Z02.89  · Essential hypertension     401.9/I10  · Fatigue     780.79/R53.83  · Hyperlipidemia     272.4/E78.5      Plan  · Medications  o Medications have been Reconciled  o Transition of Care or Provider Policy  · Instructions  o Patient's Health Care Goals: Maintain current status /continue current treatment plan   o Provider's Health Care Goals: Continued over all health and wellness   o Patient was provided an electronic copy of care plan  o CCM services were explained and offered and patient has accepted these services.  o Patient has given their written consent to recieve CCM services and understands that this includes the authorization of electronic communication of medical information with other treating providers.  o Patient understands that they may stop CCM services at any time and these changes will be effective at the end of the calendar month and will effectively revocate the agreement of CCM services.  o Patient understands that only one practioner can furnish and be paid for CCM services during one calendar month. Patient also understands that there may be co-payment or deductible fees in association with CCM services.  o Patient will continue with at least monthly  follow-up calls with the Nurse Navigator.  · Associate Tasks  o Task ID [id number] CCM: CCM notes January 2021            Electronically Signed by: Romel Duckworth MA -Author on January 26, 2021 01:29:13 PM

## 2021-05-10 NOTE — OUTREACH NOTE
Quick Note      Patient Name: Yeimy Yang   Patient ID: [id number]   Sex: Female   YOB: 1945    Primary Care Provider: Anna BETANCOURT   Referring Provider: Anna BETANCOURT    Visit Date: March 31, 2021    Provider: SERAFIN Gross   Location: [facility]   Location Address: [address] [place] [address]  [id number]   Location Phone: [phone]          History Of Present Illness  CCM Comprehensive Care Plan    This Chronic Medical Management Care Plan for Yeimy Yang, 76 year old /White female, has been monitored and managed, reviewed, and revised for the month of March. A cumulative time of 30 minutes was spent on this patient record, including face to face with provider, phone call with patient, phone call with other providers, electronic communication with other providers, and chart review.   Regarding the patient's diagnoses Essential hypertension, Hyperlipidemia, and hypertension, the following items were adressed: medical records and medications and any changes can be found within the plan section of the note. A detailed listing of time spent for chronic care management has been scanned into the patient's electronic record. Current medications include: amlodipine 2.5 mg oral tablet, Aspir-81 81 mg Oral Tablet, Delayed Release (E.C.), B12 lozenge, cetirizine 10 mg oral tablet, Estrace 0.01 % (0.1 mg/gram) vaginal cream, hydrocortisone acetate 25 mg rectal suppository, losartan 50 mg oral tablet, multivitamin oral tablet, propranolol 20 mg oral tablet, simvastatin 10 mg oral tablet, Singulair 10 mg oral tablet, Vitamin D3 5,000 unit oral tablet, Voltaren 1 % topical gel, and Zoloft 50 mg oral tablet and the patient is reported to be compliant with medication protocol. Medications are reported to be effective. Regarding these diagnoses, continues follow up and lab work at [facility] in [place] and Navigator has called  for records for PCP.   The patient was monitored remotely for blood pressure and activity level for a period of 3 minutes.   This patient's physical needs are currently being met.   Patient's mental support needs are currently being met.   The patient's cognitive support needs are currently being met.   The patient's psychosocial support needs include:, continued support and coordination of community providers. Getting lab work and records from [place] PCP Dr. Correa for PCP to review as they are returning to [place] next month. Patient reports blood pressures are good and will discuss to stop services once they are home and stable.   This patient's functional needs are N/A.   This patient's environmental needs are N/A.           Assessment  · Chronic Care Management (CCM)     V68.89/Z02.89  · Essential hypertension     401.9/I10  · Fatigue     780.79/R53.83  · Hyperlipidemia     272.4/E78.5      Plan  · Instructions  o Patient's Health Care Goals: To get home from [place] safe and see Anna for follow up.  o Provider's Health Care Goals: Monitor how she did in [place] and discuss medications and labs at follow up.  o Patient was provided an electronic copy of care plan  o CCM services were explained and offered and patient has accepted these services.  o Patient has given their written consent to recieve CCM services and understands that this includes the authorization of electronic communication of medical information with other treating providers.  o Patient understands that they may stop CCM services at any time and these changes will be effective at the end of the calendar month and will effectively revocate the agreement of CCM services.  o Patient understands that only one practioner can furnish and be paid for CCM services during one calendar month. Patient also understands that there may be co-payment or deductible fees in association with CCM services.  o Patient will continue with at least monthly follow-up  calls with the Nurse Navigator.  · Associate Tasks  o Task ID [id number] CCM: CCM notes March 2021            Electronically Signed by: Zonia Rivas RN -Author on March 31, 2021 01:18:32 PM

## 2021-05-10 NOTE — OUTREACH NOTE
Quick Note      Patient Name: Yeimy Yang   Patient ID: [id number]   Sex: Female   YOB: 1945    Primary Care Provider: Anna BETANCOURT   Referring Provider: Anna BETANCOURT    Visit Date: March 31, 2021    Provider: SERAFIN Gross   Location: [facility]   Location Address: [address] [place] [address]  [id number]   Location Phone: [phone]          History Of Present Illness     CCM     TaskID: [id number]    Task Subject: CCM notes March 2021    Task Comments:    Date: Mar 31 2021  1:12PM     Creator: karel  CCM plan of care updated and goals updated for the month. (charting 8 minutes)    Date: Mar 31 2021 11:23AM     Creator: karel  Call placed to Dr. Correa's office in [facility] and spoke with Georgiana and she is going to fax over the office visit notes and lab wok done for our PCP to review and for continuity of care as they were supposed to draw labs ordered by PCP last month or at some point this month. I messaged Abdoulaye at office to inform I am expecting records. (7 minutes)    Date: Mar 31 2021 11:18AM     Creator: karel  I called and spoke with patient, she reports she just finished a game of Comix[employer] board in their community home in [place] and was going in to get cooled down and pack for trip home to [place] tomorrow am. She reports her blood pressures are really good but no time to give readings at this time. She did have lab work done at [place].  reported Dr. Correa increased her medicine and she had to call to get copies of records. They are riding home tomorrow with neighbors in [place] who are also neighbors in [place] and leaving at 5am. I told her I was going to get records for PCP and lab work. No other needs. (8 minutes)    Date: Mar 26 2021 12:36PM     Creator: karel  Called patient for monthly check, and to get blood pressure log for PCP for continuity of care while in [place]. I left detailed message on her cell  to call me back. (4 minutes)    Date: Mar 25 2021  4:15PM     Creator: karel  Chart review, looking for faxed labs for the doc in [place] as they were supposed to send labs back to us that PCP sent orders for. Nothing in chart to date. (3 minutes)                   Electronically Signed by: Zonia Rivas RN -Author on March 31, 2021 01:19:05 PM

## 2021-05-10 NOTE — OUTREACH NOTE
Quick Note      Patient Name: Yeimy Yang   Patient ID: [id number]   Sex: Female   YOB: 1945    Primary Care Provider: Anna BETANCOURT   Referring Provider: Anna BETANCOURT    Visit Date: January 26, 2021    Provider: SERAFIN Gross   Location: [facility]   Location Address: [address] [place] [address]  [id number]   Location Phone: [phone]          History Of Present Illness     [facility]     TaskID: [id number]    Task Subject: CCM notes January 2021    Task Comments:    Date: Jan 11 2021  1:24PM     Creator: karel  Called patient and spoke with her, she is doing ok. she went to play shuffle board this morning and she is feeling good. Her blood pressures are still up and down. She reports she and Franklyn both have follow up tomorrow with Dr. Correa in [place]. I took down all her blood pressure readings for january with her pulse and documented on paper for PCP and scanned for her to sign off on. Patient is aware to let me know of any changes if doctor in [place] makes any medication changes. (14 minutes)    Date: Jan 11 2021 12:03PM     Creator: karel  Per chart review, no recent blood pressure readings in chart. I did note a medical records release request dated 12/16/2020 for records for her and him be sent to new provider in [place] for their winter stay to  Dr. Campos Correa [address] [place] [address] and office number of [phone] and fax number [phone]. I will call patient to be sure they have gotten records sent and to check her blood pressure readings for this month so far. (7 minutes)             Plan  · Medications  o Medications have been Reconciled  o Transition of Care or Provider Policy            Electronically Signed by: Romel Duckworth MA -Author on January 26, 2021 11:10:18 AM

## 2021-05-11 ENCOUNTER — HOSPITAL ENCOUNTER (OUTPATIENT)
Dept: GENERAL RADIOLOGY | Facility: HOSPITAL | Age: 76
Discharge: HOME | End: 2021-05-11
Attending: NURSE PRACTITIONER

## 2021-05-11 LAB
ALBUMIN SERPL-MCNC: 4.6 G/DL (ref 3.5–5)
ALBUMIN/GLOB SERPL: 1.5 {RATIO} (ref 1.4–2.6)
ALP SERPL-CCNC: 95 U/L (ref 43–160)
ALT SERPL-CCNC: 18 U/L (ref 10–40)
ANION GAP SERPL CALC-SCNC: 14 MMOL/L (ref 8–19)
AST SERPL-CCNC: 19 U/L (ref 15–50)
BASOPHILS # BLD AUTO: 0.04 10*3/UL (ref 0–0.2)
BASOPHILS NFR BLD AUTO: 0.5 % (ref 0–3)
BILIRUB SERPL-MCNC: 0.41 MG/DL (ref 0.2–1.3)
BUN SERPL-MCNC: 17 MG/DL (ref 5–25)
BUN/CREAT SERPL: 27 {RATIO} (ref 6–20)
CALCIUM SERPL-MCNC: 9.7 MG/DL (ref 8.7–10.4)
CHLORIDE SERPL-SCNC: 104 MMOL/L (ref 99–111)
CHOLEST SERPL-MCNC: 205 MG/DL (ref 107–200)
CHOLEST/HDLC SERPL: 3.4 {RATIO} (ref 3–6)
CONV ABS IMM GRAN: 0.03 10*3/UL (ref 0–0.2)
CONV CO2: 27 MMOL/L (ref 22–32)
CONV IMMATURE GRAN: 0.3 % (ref 0–1.8)
CONV TOTAL PROTEIN: 7.7 G/DL (ref 6.3–8.2)
CREAT UR-MCNC: 0.64 MG/DL (ref 0.5–0.9)
DEPRECATED RDW RBC AUTO: 41.1 FL (ref 36.4–46.3)
EOSINOPHIL # BLD AUTO: 0.35 10*3/UL (ref 0–0.7)
EOSINOPHIL # BLD AUTO: 4 % (ref 0–7)
ERYTHROCYTE [DISTWIDTH] IN BLOOD BY AUTOMATED COUNT: 12.7 % (ref 11.7–14.4)
GFR SERPLBLD BASED ON 1.73 SQ M-ARVRAT: >60 ML/MIN/{1.73_M2}
GLOBULIN UR ELPH-MCNC: 3.1 G/DL (ref 2–3.5)
GLUCOSE SERPL-MCNC: 104 MG/DL (ref 65–99)
HCT VFR BLD AUTO: 42.6 % (ref 37–47)
HDLC SERPL-MCNC: 60 MG/DL (ref 40–60)
HGB BLD-MCNC: 14 G/DL (ref 12–16)
LDLC SERPL CALC-MCNC: 122 MG/DL (ref 70–100)
LYMPHOCYTES # BLD AUTO: 2.53 10*3/UL (ref 1–5)
LYMPHOCYTES NFR BLD AUTO: 28.9 % (ref 20–45)
MCH RBC QN AUTO: 29.1 PG (ref 27–31)
MCHC RBC AUTO-ENTMCNC: 32.9 G/DL (ref 33–37)
MCV RBC AUTO: 88.6 FL (ref 81–99)
MONOCYTES # BLD AUTO: 0.67 10*3/UL (ref 0.2–1.2)
MONOCYTES NFR BLD AUTO: 7.7 % (ref 3–10)
NEUTROPHILS # BLD AUTO: 5.13 10*3/UL (ref 2–8)
NEUTROPHILS NFR BLD AUTO: 58.6 % (ref 30–85)
NRBC CBCN: 0 % (ref 0–0.7)
OSMOLALITY SERPL CALC.SUM OF ELEC: 294 MOSM/KG (ref 273–304)
PLATELET # BLD AUTO: 264 10*3/UL (ref 130–400)
PMV BLD AUTO: 10.2 FL (ref 9.4–12.3)
POTASSIUM SERPL-SCNC: 4.4 MMOL/L (ref 3.5–5.3)
RBC # BLD AUTO: 4.81 10*6/UL (ref 4.2–5.4)
SODIUM SERPL-SCNC: 141 MMOL/L (ref 135–147)
T4 FREE SERPL-MCNC: 1.2 NG/DL (ref 0.9–1.8)
TRIGL SERPL-MCNC: 113 MG/DL (ref 40–150)
TSH SERPL-ACNC: 1.9 M[IU]/L (ref 0.27–4.2)
VLDLC SERPL-MCNC: 23 MG/DL (ref 5–37)
WBC # BLD AUTO: 8.75 10*3/UL (ref 4.8–10.8)

## 2021-05-11 NOTE — OUTREACH NOTE
"   Quick Note      Patient Name: Yeimy Yang   Patient ID: [id number]   Sex: Female   YOB: 1945    Primary Care Provider: Anna BETANCOURT   Referring Provider: Anna BETANCOURT    Visit Date: April 30, 2021    Provider: SERAFIN Gross   Location: [facility]   Location Address: [address] [place] [address]  [id number]   Location Phone: [phone]          History Of Present Illness     20.0, thyroid assays WNL, Vit D WNL, CBC WNL and results on her chart and PCP has signed off on them. All labs PCP had ordered and we faxed to them were addressed in orders (CBC, CMP, Lipid,and TSH and PCP can attach results at her follow up 5/5/2021 to her orders. Patient to bring her most recent blood pressure log as well. (chart review 9 minutes)  \">CCM     TaskID: [id number]    Task Subject: CCM notes April 2021    Task Comments:    Date: Apr 30 2021  1:21PM     Creator: karel  CCM monthly plan of care and goals updated for month after call with patient. Patient ACO gaps met and facesheet up to date. (8 minutes)    Date: Apr 30 2021  1:13PM     Creator: karel  Called patient, they are on way back form labs at [facility] for Franklyn. She is doing well. He blood pressures are great and she will bring her readings in next week for her appointment. We discussed stopping CCM services. She reports they will be going back to [place] in October and they will need it then for sure. Will discuss with PCP at follow up. (5 minutes)    Date: Apr 27 2021 11:38AM     Creator: karel  Called patient for monthly CCM call and to get blood pressure readings. She has follow up with PCP for 5/5/2021. Left message to call back to my work cell. (2 minutes)    Date: Apr 6 2021  1:34PM     Creator: karel  Noted chart review with charting and labs from Dr. Correa in [place] in her chart. Her last office visit note is from 11/27/2020 and her blood pressure was 150/100 " and he changed her Amlodipine to 2.5 mg BID and no future visit notes were received. No PMH on her chart as well. She had lab work done 2/26/2021 abd chemistry WNL, Lipid Panel WNL, Vit B 12 high at 1319, folate high at > 20.0, thyroid assays WNL, Vit D WNL, CBC WNL and results on her chart and PCP has signed off on them. All labs PCP had ordered and we faxed to them were addressed in orders (CBC, CMP, Lipid,and TSH and PCP can attach results at her follow up 5/5/2021 to her orders. Patient to bring her most recent blood pressure log as well. (chart review 9 minutes)             Plan  · Medications  o Medications have been Reconciled  o Transition of Care or Provider Policy            Electronically Signed by: Zonia Rivas RN -Author on April 30, 2021 01:24:03 PM

## 2021-05-11 NOTE — OUTREACH NOTE
Quick Note      Patient Name: Yeimy Yang   Patient ID: [id number]   Sex: Female   YOB: 1945    Primary Care Provider: Anna BETANCOURT   Referring Provider: Anna BETANCOURT    Visit Date: April 30, 2021    Provider: SERAFIN Gross   Location: [facility]   Location Address: [address] [place] [address]  [id number]   Location Phone: [phone]          History Of Present Illness  CCM Comprehensive Care Plan    This Chronic Medical Management Care Plan for Yeimy Yang, 76 year old /White female, has been monitored and managed, reviewed, and revised for the month of April. A cumulative time of 24 minutes was spent on this patient record, including face to face with provider, phone call with patient, and chart review.   Regarding the patient's diagnoses Essential hypertension, Fatigue, and Hyperlipidemia, the following items were adressed: medical records and medications and any changes can be found within the plan section of the note. A detailed listing of time spent for chronic care management has been scanned into the patient's electronic record. Current medications include: amlodipine 2.5 mg oral tablet, Aspir-81 81 mg Oral Tablet, Delayed Release (E.C.), B12 lozenge, cetirizine 10 mg oral tablet, Estrace 0.01 % (0.1 mg/gram) vaginal cream, hydrocortisone acetate 25 mg rectal suppository, losartan 50 mg oral tablet, multivitamin oral tablet, propranolol 20 mg oral tablet, sertraline 50 mg oral tablet, simvastatin 10 mg oral tablet, Singulair 10 mg oral tablet, Vitamin D3 5,000 unit oral tablet, and Zoloft 50 mg oral tablet and the patient is reported to be compliant with medication protocol. Medications are reported to be effective.   The patient was monitored remotely for blood pressure and activity level for a period of 3 minutes.   This patient's physical needs are currently being met.   Patient's mental support  needs are currently being met.   The patient's cognitive support needs are currently being met.   The patient's psychosocial support needs include:, coordination of community providers. Reinforced with patient the [place] PCP did not do all labs and will discuss with PCP at follow up next month. Reports her blood pressures are great and will bring log to next appointment.   This patient's functional needs include: medication education. Continue blood pressure medications as prescribed and PCP will discuss with her any changes at follow up.   This patient's environmental needs are N/A.           Assessment  · Chronic Care Management (CCM)     V68.89/Z02.89  · Essential hypertension     401.9/I10  · Fatigue     780.79/R53.83  · Hyperlipidemia     272.4/E78.5      Plan  · Medications  o Medications have been Reconciled  o Transition of Care or Provider Policy  · Instructions  o Patient's Health Care Goals: Keep blood pressure log and see if Anna wants to change any medicines next month.   o Provider's Health Care Goals: Follow up with labs now that back from [place] and medication compliance.   o Patient was provided an electronic copy of care plan  o CCM services were explained and offered and patient has accepted these services.  o Patient has given their written consent to recieve CCM services and understands that this includes the authorization of electronic communication of medical information with other treating providers.  o Patient understands that they may stop CCM services at any time and these changes will be effective at the end of the calendar month and will effectively revocate the agreement of CCM services.  o Patient understands that only one practioner can furnish and be paid for CCM services during one calendar month. Patient also understands that there may be co-payment or deductible fees in association with CCM services.  o Patient will continue with at least monthly follow-up calls with the Nurse  Navigator.  · Associate Tasks  o Task ID [id number] CCM: CCM notes April 2021            Electronically Signed by: Zonia Rivas RN -Author on April 30, 2021 01:23:29 PM

## 2021-05-12 NOTE — PROGRESS NOTES
"   Quick Note      Patient Name: Yeimy Yang   Patient ID: [id number]   Sex: Female   YOB: 1945    Primary Care Provider: Anna BETANCOURT   Referring Provider: Anna BETANCOURT    Visit Date: April 23, 2020    Provider: SERAFIN Gross   Location: [facility]   Location Address: [address] [place] [address] [place]  [id number]   Location Phone: [phone]          History Of Present Illness  TELEHEALTH TELEPHONE VISIT  Chief Complaint: 6 month follow up   Yeimy Yang is a 75 year old /White female who is presenting for evaluation via telehealth telephone visit. Verbal consent obtained before beginning visit.   Provider spent 2:00-2:22 minutes with the patient during telehealth visit.   The following staff were present during this visit: Bob,PADMAJA, pt, (in the back ground), self(KCW)   Past Medical History/Overview of Patient Symptoms  Yeimy Yang is a 75 year old /White female who presents for evaluation and treatment of:      They are stil in [place].  She has been going to the dr there for her right heel and was dx with plantar fasciitis.  She has been wearing her brace but really can't tell a difference.  She was also scheduled to see ortho but it was cancelled due to the COVD-19 for the left knee.  She was told it was arthritis but needs to see ortho when she comes back home in 1 week.  She has not done any labs since Oct 2019 and I reviewed with pt.  Allergies:  She is doing good but hasn't really been taking med  HTN:  She feels good with the double of losartan that they did in [place].  She is now taking 50mg vs 25mg and is doing good.  LIPID:  She is doing good overall.  No issue snoted No cp/soa noted  JOINT PAIN/ARTHRITIS:  See above.  She stopped her neurotin because the \"dr in [place] told me it wouldn't help\".  She was doing every other day and then stopped the med a few weeks ago and can't tell a " difference.           Assessment  · Allergic rhinitis due to allergen     477.9/J30.9  · Essential hypertension     401.9/I10  · Hyperlipidemia     272.4/E78.5  · Joint pain     719.40/M25.50  · Arthritis     716.90/M19.90  · Left knee pain     719.46/M25.562  · Pain of right heel     729.5/M79.671    Problems Reconciled  Plan  · Orders  o Physician Telephone Evaluation, 21-30 minutes (72325) - - 04/23/2020  o ACO-39: Current medications updated and reviewed () - - 04/23/2020  o ACO-15: Pneumococcal Vaccine Administered or Previously Received (4040F) - - 04/23/2020  o ACO-14: Influenza immunization administered or previously received () - - 04/23/2020  o ACO-20: Screening Mammography documented and reviewed () - - 04/23/2020   10/25/2018  o ACO-13: Fall Risk Screening with no falls in past year or only one fall without injury in the past year (1101F) - - 04/23/2020  o ACO - Pt declines to or was not able to provide an Advance Care Plan or name a Surrogate Decision Maker (1124F) - - 04/23/2020   at [facility]  o ORTHOPEDIC CONSULTATION (ORTHO) - - 04/23/2020  · Medications  o losartan 50 mg oral tablet   SIG: take 1 tablet by oral route daily for 90 days   DISP: (90) tablets with 1 refills  Adjusted on 04/23/2020     o cetirizine 10 mg oral tablet   SIG: TAKE ONE TABLET BY MOUTH ONCE DAILY   DISP: (90) Tablet with 1 refills  Refilled on 04/23/2020     o hydrochlorothiazide 12.5 mg oral tablet   SIG: take 1 tablet (12.5 mg) by oral route once daily for 90 days   DISP: (90) Tablet with 1 refills  Refilled on 04/23/2020     o simvastatin 10 mg oral tablet   SIG: take 1 tablet by oral route daily for 90 days qd   DISP: (90) Tablet with 1 refills  Refilled on 04/23/2020     o Singulair 10 mg oral tablet   SIG: take 1 tablet (10 mg) by oral route once daily in the evening for 90 days   DISP: (90) tablets with 1 refills  Refilled on 04/23/2020     o Augmentin 875-125 mg oral tablet   SIG: take 1 tablet by oral route  2 times a day for 10 days   DISP: (20) tablets with 0 refills  Discontinued on 2020     o gabapentin 100 mg oral capsule   SIG: take 1 capsule by oral route once a day (at bedtime)   DISP: (90) capsules with 0 refills  Discontinued on 2020     o prednisone 20 mg oral tablet   SI tab po TID for 3 days1 tab po BID for 3 days1 tab po Qday for 3 days   DISP: (18) tablets with 0 refills  Discontinued on 2020     o Medications have been Reconciled  o Transition of Care or Provider Policy  · Instructions  o Advised that cheeses and other sources of dairy fats, animal fats, fast food, and the extras (candy, pastries, pies, doughnuts and cookies) all contain LDL raising nutrients. Advised to increase fruits, vegetables, whole grains, and to monitor portion sizes.   o Plan Of Care:   o Take all medications as prescribed/directed.  o Call the office with any concerns or questions.  o We will do labs in 3 weeks. They are coming home from [place] and will need to quarantined for 2 weeks. We will refer to ortho for the knee/heel since she has been seeing the [place] dr (reviewed docs).Pt stopped the Neurontin and is doing well. F.U in 6 months for reg lab and appt.  o Electronically Identified Patient Education Materials Provided Electronically  · Disposition  o Call or Return if symptoms worsen or persist.  o Return Visit Request in/on 6 months +/- 2 days ([id number]).            Electronically Signed by: SERAFIN Gross -Author on 2020 04:27:26 PM

## 2021-05-13 NOTE — PROGRESS NOTES
Progress Note      Patient Name: Yeimy Yang   Patient ID: [id number]   Sex: Female   YOB: 1945    Primary Care Provider: Anna BETANCOURT   Referring Provider: Anna BETANCOURT    Visit Date: July 16, 2020    Provider: Michelle Haskins PA-C   Location: [facility]   Location Address: [address] [place] [address]  [id number]   Location Phone: [phone]          Chief Complaint  · Surgery follow up left knee arthroscopy      History Of Present Illness  Yeimy Yang is a 75 year old /White female who presents today to [facility].      She is s/p left arthroscopic pMMT, pLMT, PFC, MFC 7/1/20 by Dr. Pierce. She is doing well. She states minimal pain. She is making progress in PT.       Past Medical History  Allergic rhinitis due to allergen; Arthritis; Carpal tunnel syndrome; Essential hypertension; Hyperlipemia; Hyperlipidemia; Hypertension; Primary osteoarthritis of left knee; Screening Mammogram; Seasonal allergies; Shoulder pain; Stroke; Tear of medial meniscus of left knee, current, unspecified tear type, initial encounter; Trochanteric Bursitis         Past Surgical History  Colonoscopy; Hysterectomy; Thyroidectomy         Medication List  Aspir-81 81 mg Oral Tablet, Delayed Release (E.C.); B12 lozenge; cetirizine 10 mg oral tablet; cod liver oil oral capsule; Glucosamine Chondroitin PLUS 105-296-43-54 mg oral capsule; hydrochlorothiazide 12.5 mg oral tablet; hydrocortisone acetate 25 mg rectal suppository; losartan 50 mg oral tablet; meloxicam 7.5 mg oral tablet; simvastatin 10 mg oral tablet; Singulair 10 mg oral tablet; Vitamin D3 5,000 unit oral tablet         Allergy List  NO KNOWN DRUG ALLERGIES       Allergies Reconciled  Family Medical History  Lung Neoplasm, Malignant; Heart Disease; Cancer, Unspecified; Family history of certain chronic disabling diseases; arthritis; Family history of Arthritis         Social History  Alcohol  "Use (Never); Assessed for Abuse/Neglect; Homemaker.; lives with spouse; ; .; Recreational Drug Use (Never); Retired.; Tobacco (Never)         Review of Systems  · Constitutional  o Denies  o : fever, chills, weight loss  · Cardiovascular  o Denies  o : chest pain, shortness of breath  · Gastrointestinal  o Denies  o : liver disease, heartburn, nausea, blood in stools  · Genitourinary  o Denies  o : painful urination, blood in urine  · Integument  o Denies  o : rash, itching  · Neurologic  o Denies  o : headache, weakness, loss of consciousness  · Musculoskeletal  o Admits  o : painful, swollen joints  · Psychiatric  o Denies  o : drug/alcohol addiction, anxiety, depression      Vitals  Date Time BP Position Site L\R Cuff Size HR RR TEMP (F) WT  HT  BMI kg/m2 BSA m2 O2 Sat        07/16/2020 10:02 AM      81 - R   174lbs 16oz 5'  2\" 32.01 1.86 97 %          Physical Examination  · Constitutional  o Appearance  o : well developed, well-nourished, no obvious deformities present  · Head and Face  o Head  o :   § Inspection  § : normocephalic  o Face  o :   § Inspection  § : no facial lesions  · Eyes  o Conjunctivae  o : conjunctivae normal  o Sclerae  o : sclerae white  · Ears, Nose, Mouth and Throat  o Ears  o :   § External Ears  § : appearance within normal limits  § Hearing  § : intact  o Nose  o :   § External Nose  § : appearance normal  · Neck  o Inspection/Palpation  o : normal appearance  o Range of Motion  o : full range of motion  · Respiratory  o Respiratory Effort  o : breathing unlabored  o Inspection of Chest  o : normal appearance  o Auscultation of Lungs  o : no audible wheezing or rales  · Cardiovascular  o Heart  o : regular rate  · Gastrointestinal  o Abdominal Examination  o : soft and non-tender  · Skin and Subcutaneous Tissue  o General Inspection  o : intact, no rashes  · Psychiatric  o General  o : Alert and oriented x3  o Judgement and Insight  o : judgment and insight " intact  o Mood and Affect  o : mood normal, affect appropriate  · Left Knee  o Inspection  o : Well approximated incisions. No signs of infection. ROM 5-100. Calf supple/nontender. Negative Mike's. Antalgic gait.           Assessment  · Left Aftercare following surgery of the muskuloskeletal system     V54.81      Plan  · Medications  o Medications have been Reconciled  o Transition of Care or Provider Policy  · Instructions  o Reviewed the patient's Past Medical, Social, and Family history as well as the ROS at today's visit, no changes.  o Call or return if worsening symptoms.  o Continue PT. Follow up 4 weeks.   o Electronically Identified Patient Education Materials Provided Electronically            Electronically Signed by: MILO Sharif-C -Author on July 16, 2020 10:29:55 AM  Electronically Co-signed by: Issac Pierce MD -Reviewer on July 17, 2020 09:37:23 AM

## 2021-05-13 NOTE — PROGRESS NOTES
Progress Note      Patient Name: Yeimy Yang   Patient ID: [id number]   Sex: Female   YOB: 1945    Primary Care Provider: Anna BETANCOURT   Referring Provider: Anna BETANCOURT    Visit Date: October 21, 2020    Provider: SERAFIN Gross   Location: [facility]   Location Address: [address] [place] [address]  [id number]   Location Phone: [phone]          Chief Complaint     Follow up and med refills       History Of Present Illness  Yeimy Yang is a 75 year old /White female who presents for evaluation and treatment of:      Here for refills and reviewed labs.   HTN: She is doing well with current med but her BP is staying up and she is ready to go to [place] and leaving on Nov 5th  LIPIDS: She is doing well with current med.  Allergies:  She is taking her meds. NO issues recently even with the weather change.     She is doing good overall.      She feels that she may have UTI but it is a strong smelling urine.  She goes to acute care to see about UTI--she will get some red pills.  She doesn't feel like it is a UTI this time just smell.  No vaginal bleeding noted.       Past Medical History  Disease Name Date Onset Notes   Allergic rhinitis due to allergen 08/09/2018 --    Arthritis --  --    Carpal tunnel syndrome --  Right Wrist   Essential hypertension 08/09/2018 --    Hyperlipemia --  --    Hyperlipidemia --  --    Primary osteoarthritis of left knee 08/01/2017 --    Screening Mammogram 8/16/18 --    Seasonal allergies --  --    Shoulder pain --  Right shoulder   Stroke --  --    Tear of medial meniscus of left knee, current, unspecified tear type, initial encounter 08/01/2017 --    Trochanteric Bursitis 08/24/2015 --          Past Surgical History  Procedure Name Date Notes   Colonoscopy 10/30/15 --    Hysterectomy --  --    Thyroidectomy --  --          Medication List  Name Date Started Instructions    Aspir-81 81 mg Oral Tablet, Delayed Release (E.C.)  take 1 tablet (81 mg) by oral route once daily   B12 lozenge  500mg daily   cetirizine 10 mg oral tablet 10/21/2020 TAKE ONE TABLET BY MOUTH ONCE DAILY   hydrochlorothiazide 12.5 mg oral tablet 10/21/2020 take 1 tablet (12.5 mg) by oral route once daily for 90 days   hydrocortisone acetate 25 mg rectal suppository 02/11/2020 insert 1 suppository (25 mg) by rectal route 2 times per day   losartan 50 mg oral tablet 10/21/2020 take 1 tablet by oral route daily for 90 days   multivitamin oral tablet  take 1 tablet by oral route daily   simvastatin 10 mg oral tablet 10/21/2020 take 1 tablet by oral route daily for 90 days qd   Singulair 10 mg oral tablet 10/21/2020 take 1 tablet (10 mg) by oral route once daily in the evening for 90 days   Vitamin D3 5,000 unit oral tablet  take 1 tablet by oral route daily   Voltaren 1 % topical gel 08/18/2020 apply 4 grams to the affected area(s) by topical route 4 times per day         Allergy List  Allergen Name Date Reaction Notes   NO KNOWN DRUG ALLERGIES --  --  --        Allergies Reconciled  Family Medical History  Disease Name Relative/Age Notes   Lung Neoplasm, Malignant  --    Heart Disease Father/   Father  Mother   Cancer, Unspecified Brother/   Brother   Family history of certain chronic disabling diseases; arthritis Mother/   Mother   Family history of Arthritis Mother/   Mother         Social History  Finding Status Start/Stop Quantity Notes   Alcohol Use Never --/-- --  does not drink   Assessed for Abuse/Neglect --  --/-- --  Denies Abuse   Homemaker. --  --/-- --  09/13/2017 -    lives with spouse --  --/-- --  --     --  --/-- --  --    . --  --/-- --  --    Recreational Drug Use Never --/-- --  no  09/13/2017 - no   Retired. --  --/-- --  --    Tobacco Never --/-- --  never smoker         Immunizations  NameDate Admin Mfg Trade Name Lot Number Route Inj VIS Given VIS Publication  "  Ofxjezgsj42/09/2020 NE Not Entered  NE NE 10/09/2020    Comments: [facility]   Prevnar 1301/27/2016 [facility] PREVNAR 13 [id number] IM LD 01/27/2016 02/27/2013   Comments:    Lzuvqujw41/22/2019 NE Not Entered  NE NE 07/22/2019    Comments: [facility] 2nd dose   Ubyzmsun81/13/2019 NE Not Entered  NE NE 05/13/2019    Comments: [facility] 1St dose   Argcugpb06/17/2013 NE Not Entered  NE NE 10/17/2013    Comments:    Td07/09/2019 [facility] DECAVAC [id number] IM LD 07/09/2019    Comments: Pt tolerated well, left office in stable condition   Tdap06/13/2012 NE Not Entered  NE NE 06/18/2012    Comments:          Review of Systems  · Constitutional  o Denies  o : fever, fatigue, weight loss, wt gain  · HENT  o Admits  o : postnasal drip  o Denies  o : nasal congestion, nasal discharge, sore throat, ear pain, ear fullness  · Cardiovascular  o Denies  o : chest pain, syncope  · Respiratory  o Denies  o : shortness of breath, wheezing, cough  · Musculoskeletal  o Admits  o : joint pain, joint swelling, muscle pain, knee pain      Vitals  Date Time BP Position Site L\R Cuff Size HR RR TEMP (F) WT  HT  BMI kg/m2 BSA m2 O2 Sat FR L/min FiO2 HC       10/21/2020 01:45 /98 Sitting    93 - R 14 97.4 175lbs 16oz 5'  2\" 32.19 1.87 96 %      10/21/2020 02:00 /90 Sitting                 10/21/2020 02:02 /90 Sitting                       Physical Examination  · Constitutional  o Appearance  o : well-nourished, well developed, alert, in no acute distress  · Respiratory  o Respiratory Effort  o : breathing unlabored  o Auscultation of Lungs  o : normal breath sounds throughout  · Cardiovascular  o Heart  o :   § Auscultation of Heart  § : regular rate and rhythm, no murmurs, gallops or rubs  § Palpation of Heart  § : normal apical impulse, no cardiac thrill present  o Peripheral Vascular System  o :   § Carotid Arteries  § : normal pulses bilaterally, no bruits present  § Pedal Pulses  § : pulses 2 " bilaterally  § Extremities  § : no edema, no cyanosis, no distal hair loss, normal capillary refill  · Neurologic  o Mental Status Examination  o :   § Orientation  § : grossly oriented to person, place and time  o Cranial Nerves  o : cranial nerves intact and symmetric throughout  · Psychiatric  o Mood and Affect  o : mood normal, affect appropriate, denies any SI/HI          Assessment  · Allergic rhinitis due to allergen     477.9/J30.9  · Essential hypertension     401.9/I10  · Hyperlipidemia     272.4/E78.5  · Joint pain     719.40/M25.50  · Arthritis     716.90/M19.90  · Vaginal irritation     623.9/N89.8      Plan  · Orders  o BLAISE Report (KASPR) - - 10/21/2020  o ACO-39: Current medications updated and reviewed () - - 10/21/2020  o ACO-15: Pneumococcal Vaccine Administered or Previously Received (4040F) - - 10/21/2020  o ACO-14: Influenza immunization administered or previously received () - - 10/21/2020  o ACO-20: Screening Mammography documented and reviewed (3014F) - - 10/21/2020   6/2020  o ACO-19: Colorectal cancer screening results documented and reviewed (3017F) - - 10/21/2020   2015  o ACO-13: Fall Risk Screening with no falls in past year or only one fall without injury in the past year (1101F) - - 10/21/2020  o Physical, Primary Care Panel (CBC, CMP, Lipid, TSH) [facility] ([id number], 61521, 41922, 83934) - - 04/21/2021  · Medications  o Estrace 0.01 % (0.1 mg/gram) vaginal cream   SIG: insert 1 gram by vaginal route once weekly as needed   DISP: (1) Tube with 0 refills  Prescribed on 10/21/2020     o losartan 50 mg oral tablet   SIG: take 1 tablet by oral route 2 times a day for 90 days   DISP: (180) Tablet with 1 refills  Adjusted on 10/21/2020     o cetirizine 10 mg oral tablet   SIG: TAKE ONE TABLET BY MOUTH ONCE DAILY   DISP: (90) Tablet with 1 refills  Refilled on 10/21/2020     o hydrochlorothiazide 12.5 mg oral tablet   SIG: take 1 tablet (12.5 mg) by oral route once daily for 90 days    DISP: (90) Tablet with 1 refills  Refilled on 10/21/2020     o simvastatin 10 mg oral tablet   SIG: take 1 tablet by oral route daily for 90 days qd   DISP: (90) Tablet with 1 refills  Refilled on 10/21/2020     o Singulair 10 mg oral tablet   SIG: take 1 tablet (10 mg) by oral route once daily in the evening for 90 days   DISP: (90) Tablet with 1 refills  Refilled on 10/21/2020     o Medications have been Reconciled  o Transition of Care or Provider Policy  · Instructions  o Advised that cheeses and other sources of dairy fats, animal fats, fast food, and the extras (candy, pasteries, pies, doughnuts and cookies) all contain LDL raising nutrients. Advised to increase fruits, vegetables, whole grains, and to monitor portion sizes.   o Take all medications as prescribed/directed.  o Patient was educated/instructed on their diagnosis, treatment and medications prior to discharge from the clinic today.  o Patient instructed to seek medical attention urgently for new or worsening symptoms.  o Call the office with any concerns or questions.  o Patient given paper scripts today.  o We will f.u in 6 months for labs and appt. Call with any concerns or questions. She will check her BP daily and keep me posted and she will come into the office for a BP check before leaving for [place]. We will go from there.  · Disposition  o Call or Return if symptoms worsen or persist.  o Return Visit Request in/on 6 months +/- 2 days ([id number]).            Electronically Signed by: SERAFIN Gross -Author on October 21, 2020 02:28:39 PM

## 2021-05-13 NOTE — PROGRESS NOTES
Progress Note      Patient Name: Yeimy Yang   Patient ID: [id number]   Sex: Female   YOB: 1945    Primary Care Provider: Anna BETANCOURT   Referring Provider: Anna BETANCOURT    Visit Date: October 21, 2020    Provider: SERAFIN Gross   Location: [facility]   Location Address: [address] [place], [place] [address]  [id number]   Location Phone: [phone]          Chief Complaint  · Annual Wellness Exam      History Of Present Illness  The patient is a 75 year old /White female who has come to this office for her Annual Wellness Visit.   Her Primary Care Provider is Anna BETANCOURT. Her comprehensive Care Team list, including suppliers, has been updated on the Facesheet. Her medical/family history, height, weight, BMI, and blood pressure have been reviewed and are in the chart. The Health Risk Assessment has been completed and scanned in the chart.   Medications are listed in the medication list.   The active problem list includes: Allergic rhinitis due to allergen, Essential hypertension, Hyperlipidemia, Primary osteoarthritis of left knee, Tear of medial meniscus of left knee, current, unspecified tear type, initial encounter, and Trochanteric Bursitis   The patient does not have a history of substance use.   Patient reports her diet is adequate.   The Mini-Cog has been administered and is scanned in chart. The results are negative. Her cognitive function is without limitation.   A hearing loss screen was completed today and the result is negative.   Patient does not have any risk factors for depression. Patient completed the PHQ-9 today and it has been scanned in the chart. The total score is 1-4.   The Timed Up and Go screen was administered today and the result is negative.   The Vyas Index of [place] in ADLs indicated full function (score of 6).   A Falls Risk Assessment has been completed, including a review of  "home fall hazards and medication review.   Overall, the patient's functional ability is noted by this provider to be within normal limits. Her level of safety is noted to be within normal limits. Her balance/gait is within normal limits. There have been no falls in the past year. Patient-specific home safety recommendations have been reviewed and a copy has been given to patient.   She denies issues with leaking urine.   There are no additional risk factors identified.   Living Will/Advanced Directive previously executed but not in chart.   Personalized health advice was given to the patient and a written health screening schedule was established; see Plan for details.       Vitals  Date Time BP Position Site L\R Cuff Size HR RR TEMP (F) WT  HT  BMI kg/m2 BSA m2 O2 Sat FR L/min FiO2 HC       10/21/2020 01:45 /98 Sitting    93 - R 14 97.4 175lbs 16oz 5'  2\" 32.19 1.87 96 %      10/21/2020 02:00 /90 Sitting                 10/21/2020 02:02 /90 Sitting                       Physical Examination  · Constitutional  o Appearance  o : well-nourished, well developed, alert, in no acute distress          Assessment  · Encounter for Medicare annual wellness exam     V70.0/Z00.00  · Screening for depression     V79.0/Z13.89  · Screening for alcoholism     V79.1/Z13.39  · Advanced care planning/counseling discussion     V65.49/Z71.89  · Allergic rhinitis due to allergen     477.9/J30.9  · Essential hypertension     401.9/I10  · Primary osteoarthritis of left knee     715.16/M17.12  · Tear of medial meniscus of left knee, current, unspecified tear type, initial encounter     836.0/S83.242A  · Trochanteric Bursitis     726.5/M70.60  · Hyperlipidemia     272.4/E78.5      Plan  · Orders  o Falls Risk Assessment Completed (3288F) - V70.0/Z00.00 - 10/21/2020  o Brief hearing screening (written) [facility] () - V70.0/Z00.00 - 10/21/2020  o Annual Wellness Visit-includes a Personalized Prevention Plan of Service (PPS), " SUBSEQUENT VISIT (Medicare) () - V70.0/Z00.00 - 10/21/2020  o ACO-13: Fall Risk Screening with no falls in past year or only one fall without injury in the past year (1101F) - V70.0/Z00.00 - 10/21/2020  o Presence or absence of urinary incontinence assessed (ALIZA) (1090F) - V70.0/Z00.00 - 10/21/2020  o ACO-18: Negative screen for clinical depression using a standardized tool () - V79.0/Z13.89 - 10/21/2020  o Negative alcohol screening () - V79.1/Z13.39 - 10/21/2020  o ACO-39: Current medications updated and reviewed (1159F, ) - - 10/21/2020  o Influenza immunization was ordered or administered () - - 10/21/2020  o ACO-15: Pneumococcal Vaccine Administered or Previously Received (4040F) - - 10/21/2020  o ACO - Pt declines to or was not able to provide an Advance Care Plan or name a Surrogate Decision Maker (1124F) - - 10/21/2020   at [facility]  · Medications  o Medications have been Reconciled  o Transition of Care or Provider Policy  · Instructions  o Health Risk Assessment has been reviewed with the patient.  o Written health screening schedule for next 5-10 years was established with patient; information scanned in chart and given/mailed to patient.  o Fall prevention methods discussed and a copy of recommendations given/mailed to patient.  o Today's PHQ-9 score is: _1__  o Depression Screen completed and scanned into the EMR under the designated folder within the patient's documents.  o Audit-C Questionnaire completed and scanned into the EMR under the designated folder within the patient's documents.  o Audit-C score of 0-4 - Negative Screen - Brief Discussion  · Disposition  o Call or Return if symptoms worsen or persist.  o Return Visit Request in/on 1 year +/- 2 days (13697).            Electronically Signed by: SERAFIN Gross -Author on October 21, 2020 02:07:30 PM

## 2021-05-13 NOTE — PROGRESS NOTES
Progress Note      Patient Name: Yeimy Yang   Patient ID: [id number]   Sex: Female   YOB: 1945    Primary Care Provider: Anna BETANCOURT   Referring Provider: Anna BETANCOURT    Visit Date: September 18, 2020    Provider: Michelle Haskins PA-C   Location: [facility]   Location Address: [address] [place] [address]  [id number]   Location Phone: [phone]          Chief Complaint  · Follow up left knee arthroscopy      History Of Present Illness  Yeimy Yang is a 75 year old /White female who presents today to [facility].      She is s/p left arthroscopic pMMT, pLMT, PFC, MFC 7/1/20 by Dr. Pierce. She states medial knee pain and pain at distal lateral incision. She fell on her knee when the bottom rung of a ladder broke. She was discharged from . She plateaued in her therapy.       Past Medical History  Allergic rhinitis due to allergen; Arthritis; Carpal tunnel syndrome; Essential hypertension; Hyperlipemia; Hyperlipidemia; Hypertension; Primary osteoarthritis of left knee; Screening Mammogram; Seasonal allergies; Shoulder pain; Stroke; Tear of medial meniscus of left knee, current, unspecified tear type, initial encounter; Trochanteric Bursitis         Past Surgical History  Colonoscopy; Hysterectomy; Thyroidectomy         Medication List  Aspir-81 81 mg Oral Tablet, Delayed Release (E.C.); B12 lozenge; cetirizine 10 mg oral tablet; cod liver oil oral capsule; Glucosamine Chondroitin PLUS [phone] mg oral capsule; hydrochlorothiazide 12.5 mg oral tablet; hydrocortisone acetate 25 mg rectal suppository; losartan 50 mg oral tablet; meloxicam 7.5 mg oral tablet; simvastatin 10 mg oral tablet; Singulair 10 mg oral tablet; Vitamin D3 5,000 unit oral tablet; Voltaren 1 % topical gel         Allergy List  NO KNOWN DRUG ALLERGIES       Allergies Reconciled  Family Medical History  Lung Neoplasm, Malignant; Heart Disease; Cancer,  "Unspecified; Family history of certain chronic disabling diseases; arthritis; Family history of Arthritis         Social History  Alcohol Use (Never); Assessed for Abuse/Neglect; Homemaker.; lives with spouse; ; .; Recreational Drug Use (Never); Retired.; Tobacco (Never)         Review of Systems  · Constitutional  o Denies  o : fever, chills, weight loss  · Cardiovascular  o Denies  o : chest pain, shortness of breath  · Gastrointestinal  o Denies  o : liver disease, heartburn, nausea, blood in stools  · Genitourinary  o Denies  o : painful urination, blood in urine  · Integument  o Denies  o : rash, itching  · Neurologic  o Denies  o : headache, weakness, loss of consciousness  · Musculoskeletal  o Admits  o : painful, swollen joints  · Psychiatric  o Denies  o : drug/alcohol addiction, anxiety, depression      Vitals  Date Time BP Position Site L\R Cuff Size HR RR TEMP (F) WT  HT  BMI kg/m2 BSA m2 O2 Sat        09/18/2020 09:36 AM      86 - R   180lbs 0oz 5'  2\" 32.92 1.89 98 %          Physical Examination  · Constitutional  o Appearance  o : well developed, well-nourished, no obvious deformities present  · Head and Face  o Head  o :   § Inspection  § : normocephalic  o Face  o :   § Inspection  § : no facial lesions  · Eyes  o Conjunctivae  o : conjunctivae normal  o Sclerae  o : sclerae white  · Ears, Nose, Mouth and Throat  o Ears  o :   § External Ears  § : appearance within normal limits  § Hearing  § : intact  o Nose  o :   § External Nose  § : appearance normal  · Neck  o Inspection/Palpation  o : normal appearance  o Range of Motion  o : full range of motion  · Respiratory  o Respiratory Effort  o : breathing unlabored  o Inspection of Chest  o : normal appearance  o Auscultation of Lungs  o : no audible wheezing or rales  · Cardiovascular  o Heart  o : regular rate  · Gastrointestinal  o Abdominal Examination  o : soft and non-tender  · Skin and Subcutaneous Tissue  o General " Inspection  o : intact, no rashes  · Psychiatric  o General  o : Alert and oriented x3  o Judgement and Insight  o : judgment and insight intact  o Mood and Affect  o : mood normal, affect appropriate  · Left Knee  o Inspection  o : Well healed incisions. Mild swelling. Tender pes bursa. Tender lateral incision. Full ROM. + crepitus. Full weight bearing.   · Injection Note/Aspiration Note  o Site  o : left knee   o Procedure  o : Procedure: After educating the patient, patient gave consent for procedure. After using Chloraprep, the joint space was injected. The patient tolerated the procedure well.   o Medication  o : 80 mg of DepoMedrol with 9cc of 1% Lidocaine          Assessment  · Left Aftercare following surgery of the muskuloskeletal system     V54.81  · Pain: Knee     719.46/M25.569      Plan  · Orders  o Depo-Medrol injection 80mg () - 719.46/M25.569 - 09/18/2020   Lot [id number] exp 07 21 Geraldine PEDRAZA  o Knee Intra-articular Injection without US [facility] (50236) - 719.46/M25.569 - 09/18/2020   Lot 08 080 DK exp 08 21 Peace PEDRAZA  · Medications  o Medications have been Reconciled  o Transition of Care or Provider Policy  · Instructions  o Reviewed the patient's Past Medical, Social, and Family history as well as the ROS at today's visit, no changes.  o Call or return if worsening symptoms.  o Left knee injection today. May consider gel injection in the future. Follow up as needed.  o Electronically Identified Patient Education Materials Provided Electronically            Electronically Signed by: MILO Sharif-C -Author on September 18, 2020 10:35:11 AM  Electronically Co-signed by: Issac Pierce MD -Reviewer on September 18, 2020 08:50:44 PM

## 2021-05-13 NOTE — PROGRESS NOTES
"   Progress Note      Patient Name: Yeimy Yang   Patient ID: [id number]   Sex: Female   YOB: 1945    Primary Care Provider: Anna BETANCOURT   Referring Provider: Anna BETANCOURT    Visit Date: November 2, 2020    Provider: SERAFIN Calvin   Location: [facility]   Location Address: [address] [place] [address]  [id number]   Location Phone: [phone]          Chief Complaint  · BP concerns      History Of Present Illness  Yeimy Yang is a 75 year old /White female who presents for evaluation and treatment of:      blood pressure has been uncontrolled, her bp diary shows readings as high as 194 systolic, mostly between 150 and 170s for the last few weeks. She thinks it is related to stress and anxiety. She and her  are getting ready to go to [place] for the winter and her daughter who has been on drugs has moved into their house with their granddaughters who have brought friends and boyfriends with them. She says she is meticulous with her house and they have already destroyed the house. She is very anxious about this. She went to the urgent care, who told her to f/u with her PCP.     she is on hctz and losartan.     She has had dark urine recently wiht hx of UTIs.     She denies chest pain but has had some headaches when her BP has been \"really high.\"    She has a doctor in [place] who she can see.       Past Medical History  Disease Name Date Onset Notes   Allergic rhinitis due to allergen 08/09/2018 --    Arthritis --  --    Carpal tunnel syndrome --  Right Wrist   Essential hypertension 08/09/2018 --    Hyperlipemia --  --    Hyperlipidemia --  --    Primary osteoarthritis of left knee 08/01/2017 --    Screening Mammogram 8/16/18 --    Seasonal allergies --  --    Shoulder pain --  Right shoulder   Stroke --  --    Tear of medial meniscus of left knee, current, unspecified tear type, initial encounter " 08/01/2017 --    Trochanteric Bursitis 08/24/2015 --          Past Surgical History  Procedure Name Date Notes   Colonoscopy 10/30/15 --    Hysterectomy --  --    Thyroidectomy --  --          Medication List  Name Date Started Instructions   Aspir-81 81 mg Oral Tablet, Delayed Release (E.C.)  take 1 tablet (81 mg) by oral route once daily   B12 lozenge  500mg daily   cetirizine 10 mg oral tablet 10/21/2020 TAKE ONE TABLET BY MOUTH ONCE DAILY   Estrace 0.01 % (0.1 mg/gram) vaginal cream 10/21/2020 insert 1 gram by vaginal route once weekly as needed   hydrochlorothiazide 12.5 mg oral tablet 10/21/2020 take 1 tablet (12.5 mg) by oral route once daily for 90 days   hydrocortisone acetate 25 mg rectal suppository 02/11/2020 insert 1 suppository (25 mg) by rectal route 2 times per day   losartan 50 mg oral tablet 10/21/2020 take 1 tablet by oral route 2 times a day for 90 days   multivitamin oral tablet  take 1 tablet by oral route daily   simvastatin 10 mg oral tablet 10/21/2020 take 1 tablet by oral route daily for 90 days qd   Singulair 10 mg oral tablet 10/21/2020 take 1 tablet (10 mg) by oral route once daily in the evening for 90 days   Vitamin D3 5,000 unit oral tablet  take 1 tablet by oral route daily   Voltaren 1 % topical gel 08/18/2020 apply 4 grams to the affected area(s) by topical route 4 times per day         Allergy List  Allergen Name Date Reaction Notes   NO KNOWN DRUG ALLERGIES --  --  --        Allergies Reconciled  Family Medical History  Disease Name Relative/Age Notes   Lung Neoplasm, Malignant  --    Heart Disease Father/   Father  Mother   Cancer, Unspecified Brother/   Brother   Family history of certain chronic disabling diseases; arthritis Mother/   Mother   Family history of Arthritis Mother/   Mother         Social History  Finding Status Start/Stop Quantity Notes   Alcohol Use Never --/-- --  does not drink   Assessed for Abuse/Neglect --  --/-- --  Denies Abuse   Homemaker. --  --/-- --  " 09/13/2017 -     --  --/-- --  --    Recreational Drug Use Never --/-- --  no  09/13/2017 - no   Retired. --  --/-- --  --    Tobacco Never --/-- --  never smoker         Immunizations  NameDate Admin Mfg Trade Name Lot Number Route Inj VIS Given VIS Publication   Ngfakdgjv93/09/2020 NE Not Entered  NE NE 10/09/2020    Comments: [facility]   Prevnar 1301/27/2016 [facility] PREVNAR 13 [id number] IM LD 01/27/2016 02/27/2013   Comments:    Axmunzfh19/22/2019 NE Not Entered  NE NE 07/22/2019    Comments: [facility] 2nd dose   Iylxnuoq06/13/2019 NE Not Entered  NE NE 05/13/2019    Comments: [facility] 1St dose   Ixeshgos34/17/2013 NE Not Entered  NE NE 10/17/2013    Comments:    Td07/09/2019 [facility] DECAVAC [id number] IM LD 07/09/2019    Comments: Pt tolerated well, left office in stable condition   Tdap06/13/2012 NE Not Entered  NE NE 06/18/2012    Comments:          Review of Systems  · Constitutional  o Denies  o : fever, fatigue, weight loss, weight gain  · Cardiovascular  o Denies  o : lower extremity edema, chest pressure, palpitations  · Respiratory  o Denies  o : shortness of breath, wheezing, cough, dyspnea on exertion  · Gastrointestinal  o Denies  o : nausea, vomiting, diarrhea, constipation, abdominal pain  · Genitourinary  o Denies  o : urgency, frequency, dysuria, urinary hesitancy      Vitals  Date Time BP Position Site L\R Cuff Size HR RR TEMP (F) WT  HT  BMI kg/m2 BSA m2 O2 Sat FR L/min FiO2        11/02/2020 01:44 /98 Sitting    101 - R 18 97.7 175lbs 6oz 5'  2\" 32.08 1.87 96 %            Physical Examination  · Constitutional  o Appearance  o : well developed, well-nourished, no acute distress  · Neck  o Inspection/Palpation  o : normal appearance, no masses or tenderness, trachea midline  o Thyroid  o : gland size normal, nontender, no nodules or masses present on palpation  · Respiratory  o Respiratory Effort  o : breathing unlabored  o Inspection of Chest  o : chest rise " symmetric bilaterally  o Auscultation of Lungs  o : clear to auscultation bilaterally throughout inspiration and expiration  · Cardiovascular  o Heart  o :   § Auscultation of Heart  § : regular rate and rhythm, no murmurs, gallops or rubs  o Peripheral Vascular System  o :   § Extremities  § : no edema  · Lymphatic  o Neck  o : no cervical lymphadenopathy, no supraclavicular lymphadenopathy  · Psychiatric  o Mood and Affect  o : mood normal, affect appropriate  · EKG  o EKG  o : sinus rhythm          Results  · In-Office Procedures  o Lab procedure  § IOP - Urinalysis without Microscopy (Clinitek) [facility] ([id number])   § Color Ur: Yellow   § Clarity Ur: Clear   § Glucose Ur Ql Strip: Negative   § Bilirub Ur Ql Strip: Negative   § Ketones Ur Ql Strip: Negative   § Sp Gr Ur Qn: greater than 1.030   § Hgb Ur Ql Strip: Trace-Intact   § pH Ur-LsCnc: 6.0   § Prot Ur Ql Strip: Negative   § Urobilinogen Ur Strip-mCnc: 0.2 E.U./dL   § Nitrite Ur Ql Strip: Negative   § WBC Est Ur Ql Strip: Small       Assessment  · Essential hypertension     401.9/I10  · Fatigue     780.79/R53.83  · Dark urine     791.9/R82.998  · Leukocytes in urine     791.7/R82.998  · Stress at home     V61.9/F43.9  · Anxiety     300.00/F41.9  · Blood pressure alteration     796.4/R68.89      Plan  · Orders  o ACO-39: Current medications updated and reviewed (1159F, ) - - 11/02/2020  o Urine Culture [facility] (03585) - - 11/02/2020  · Medications  o Zoloft 50 mg oral tablet   SIG: take 1 tablet (50 mg) by oral route once daily   DISP: (30) Tablet with 1 refills  Prescribed on 11/02/2020     o propranolol 20 mg oral tablet   SIG: take 1 tablet by oral route 2 times a day for 30 days   DISP: (60) Tablet with 1 refills  Prescribed on 11/02/2020     o Medications have been Reconciled  o Transition of Care or Provider Policy  · Instructions  o Patient advised to monitor blood pressure (B/P) at home and journal readings. Patient informed that a B/P reading at home of  more than 130/80 is considered hypertension. For readings greater [id number]/90 or higher patient is advised to follow up in the office with readings for management. Patient advised to limit sodium intake.  o Patient is taking medications as prescribed and doing well.   o Take all medications as prescribed/directed.  o Patient was educated/instructed on their diagnosis, treatment and medications prior to discharge from the clinic today.  o Patient instructed to seek medical attention urgently for new or worsening symptoms.  o start edis zoloft with precautions, hopefully this will help with her bp as well  · Disposition  o Call or Return if symptoms worsen or persist.            Electronically Signed by: SERAFIN Calvin -Author on November 2, 2020 04:31:41 PM

## 2021-05-13 NOTE — PROGRESS NOTES
Progress Note      Patient Name: Yeimy Yang   Patient ID: [id number]   Sex: Female   YOB: 1945    Primary Care Provider: Anna BETANCOURT   Referring Provider: Anna BETANCOURT    Visit Date: May 12, 2020    Provider: Justine Littlejohn PA-C   Location: [facility]   Location Address: [address] [place] [address]  [id number]   Location Phone: [phone]          Chief Complaint  · Follow up left knee pain      History Of Present Illness  Yeimy Yang is a 75 year old /White female who presents today to [facility].      Patient is following up for left knee pain, left knee meniscus tear. Patient states steroid injections, medications provided no relief of pain. Patient states pain with climbing stairs, twisting or turning. Patient states left knee will lock up resulting increase of pain.       Past Medical History  Allergic rhinitis due to allergen; Arthritis; Carpal tunnel syndrome; Essential hypertension; Hyperlipemia; Hyperlipidemia; Hypertension; Primary osteoarthritis of left knee; Screening Mammogram; Seasonal allergies; Shoulder pain; Stroke; Tear of medial meniscus of left knee, current, unspecified tear type, initial encounter; Trochanteric Bursitis         Past Surgical History  Colonoscopy; Hysterectomy; Thyroidectomy         Medication List  Aspir-81 81 mg Oral Tablet, Delayed Release (E.C.); B12 lozenge; cetirizine 10 mg oral tablet; cod liver oil oral capsule; Glucosamine Chondroitin PLUS 154-334-87-54 mg oral capsule; hydrochlorothiazide 12.5 mg oral tablet; hydrocortisone acetate 25 mg rectal suppository; losartan 50 mg oral tablet; simvastatin 10 mg oral tablet; Singulair 10 mg oral tablet; Vitamin D3 5,000 unit oral tablet         Allergy List  NO KNOWN DRUG ALLERGIES         Family Medical History  Lung Neoplasm, Malignant; Heart Disease; Cancer, Unspecified; Family history of certain chronic disabling diseases;  "arthritis; Family history of Arthritis         Social History  Alcohol Use (Never); Assessed for Abuse/Neglect; Homemaker.; lives with spouse; ; .; Recreational Drug Use (Never); Retired.; Tobacco (Never)         Review of Systems  · Constitutional  o Denies  o : fever, chills, weight loss  · Cardiovascular  o Denies  o : chest pain, shortness of breath  · Gastrointestinal  o Denies  o : liver disease, heartburn, nausea, blood in stools  · Genitourinary  o Denies  o : painful urination, blood in urine  · Integument  o Denies  o : rash, itching  · Neurologic  o Denies  o : headache, weakness, loss of consciousness  · Musculoskeletal  o Denies  o : painful, swollen joints  · Psychiatric  o Denies  o : drug/alcohol addiction, anxiety, depression      Vitals  Date Time BP Position Site L\R Cuff Size HR RR TEMP (F) WT  HT  BMI kg/m2 BSA m2 O2 Sat        05/12/2020 09:12 AM      82 - R   178lbs 0oz 5'  1\" 33.63 1.86 98 %          Physical Examination  · Constitutional  o Appearance  o : well developed, well-nourished, no obvious deformities present  · Head and Face  o Head  o :   § Inspection  § : normocephalic  o Face  o :   § Inspection  § : no facial lesions  · Eyes  o Conjunctivae  o : conjunctivae normal  o Sclerae  o : sclerae white  · Ears, Nose, Mouth and Throat  o Ears  o :   § External Ears  § : appearance within normal limits  § Hearing  § : intact  o Nose  o :   § External Nose  § : appearance normal  · Neck  o Inspection/Palpation  o : normal appearance  o Range of Motion  o : full range of motion  · Respiratory  o Respiratory Effort  o : breathing unlabored  o Inspection of Chest  o : normal appearance  o Auscultation of Lungs  o : no audible wheezing or rales  · Cardiovascular  o Heart  o : regular rate  · Gastrointestinal  o Abdominal Examination  o : soft and non-tender  · Skin and Subcutaneous Tissue  o General Inspection  o : intact, no rashes  · Psychiatric  o General  o : Alert and " oriented x3  o Judgement and Insight  o : judgment and insight intact  o Mood and Affect  o : mood normal, affect appropriate  · In Office Procedures  o View  o : AP/LATERAL/SUNRISE   o Site  o : left, knee  o Indication  o : Left knee pain   o Study  o : X-rays ordered, taken in the office, and reviewed today.  o Xray  o : mild to moderate osteoarthritis  o Comparative Data  o : Comparative Data found and reviewed today   · Left Knee-Street  o Inspection  o : limping gait, weight bearing, swelling present, no ecchymosis, no atrophy, neutral alignment  o Palpation  o : tenderness at medial joint line, no lateral joint line tenderness, no patellar tendon tenderness, no pain of MCL, no pain at LCL  o ROM  o : full extension, full flexion  o Strength  o : full extension, full flexion  o Special Tests  o : positive Vanessa's test, positive Apley's test  o Neurovascular  o : Full sensation, Dorsal Pedal Pulse 2+, posteriror tibialis pulse 2+          Assessment  · Primary osteoarthritis of left knee     715.16/M17.12  · MMT (medial meniscus tear)     836.0/S83.249A  · Pain: Knee     719.46/M25.569      Plan  · Orders  o Knee (Left) [facility] Preferred View ([id number]) - 719.46/M25.569 - 05/12/2020  · Medications  o Medications have been Reconciled  o Transition of Care or Provider Policy  · Instructions  o Dr. Pierce saw and examined the patient and agrees with plan.   o X-rays reviewed by Dr. Pierce.  o Reviewed the patient's Past Medical, Social, and Family history as well as the ROS at today's visit, no changes.  o Call or return if worsening symptoms.  o Discussed surgery.  o Risks/benefits discussed with patient including, but not limited to: infection, bleeding, neurovascular damage, re-rupture, aesthetic deformity, need for further surgery, and death.  o Surgery pamphlet given.  o Electronically Identified Patient Education Materials Provided Electronically            Electronically Signed by: Justine Littlejohn PA-C  -Author on May 12, 2020 10:16:50 AM  Electronically Co-signed by: Issac Pierce MD -Reviewer on May 12, 2020 11:10:33 AM

## 2021-05-13 NOTE — PROGRESS NOTES
Progress Note      Patient Name: Yeimy Yang   Patient ID: [id number]   Sex: Female   YOB: 1945    Primary Care Provider: Anna BETANCOURT   Referring Provider: Anna BETANCOURT    Visit Date: August 18, 2020    Provider: Michelle Haskins PA-C   Location: [facility]   Location Address: [address] [place] [address]  [id number]   Location Phone: [phone]          Chief Complaint  · Follow up left knee arthroscopy      History Of Present Illness  Yemiy Yang is a 75 year old /White female who presents today to [facility].      She is s/p left arthroscopic pMMT, pLMT, PFC, MFC 7/1/20 by Dr. Pierce. She is doing well. She states minimal pain. She has made good progress in PT. She states medial knee pain.             Past Medical History  Allergic rhinitis due to allergen; Arthritis; Carpal tunnel syndrome; Essential hypertension; Hyperlipemia; Hyperlipidemia; Hypertension; Primary osteoarthritis of left knee; Screening Mammogram; Seasonal allergies; Shoulder pain; Stroke; Tear of medial meniscus of left knee, current, unspecified tear type, initial encounter; Trochanteric Bursitis         Past Surgical History  Colonoscopy; Hysterectomy; Thyroidectomy         Medication List  Aspir-81 81 mg Oral Tablet, Delayed Release (E.C.); B12 lozenge; cetirizine 10 mg oral tablet; cod liver oil oral capsule; Glucosamine Chondroitin PLUS 659-724-59-54 mg oral capsule; hydrochlorothiazide 12.5 mg oral tablet; hydrocortisone acetate 25 mg rectal suppository; losartan 50 mg oral tablet; meloxicam 7.5 mg oral tablet; simvastatin 10 mg oral tablet; Singulair 10 mg oral tablet; Vitamin D3 5,000 unit oral tablet         Allergy List  NO KNOWN DRUG ALLERGIES       Allergies Reconciled  Family Medical History  Lung Neoplasm, Malignant; Heart Disease; Cancer, Unspecified; Family history of certain chronic disabling diseases; arthritis; Family history of Arthritis  "        Social History  Alcohol Use (Never); Assessed for Abuse/Neglect; Homemaker.; lives with spouse; ; .; Recreational Drug Use (Never); Retired.; Tobacco (Never)         Review of Systems  · Constitutional  o Denies  o : fever, chills, weight loss  · Cardiovascular  o Denies  o : chest pain, shortness of breath  · Gastrointestinal  o Denies  o : liver disease, heartburn, nausea, blood in stools  · Genitourinary  o Denies  o : painful urination, blood in urine  · Integument  o Denies  o : rash, itching  · Neurologic  o Denies  o : headache, weakness, loss of consciousness  · Musculoskeletal  o Admits  o : painful, swollen joints  · Psychiatric  o Denies  o : drug/alcohol addiction, anxiety, depression      Vitals  Date Time BP Position Site L\R Cuff Size HR RR TEMP (F) WT  HT  BMI kg/m2 BSA m2 O2 Sat        08/18/2020 10:20 AM      79 - R   174lbs 16oz 5'  2\" 32.01 1.86 97 %          Physical Examination  · Constitutional  o Appearance  o : well developed, well-nourished, no obvious deformities present  · Head and Face  o Head  o :   § Inspection  § : normocephalic  o Face  o :   § Inspection  § : no facial lesions  · Eyes  o Conjunctivae  o : conjunctivae normal  o Sclerae  o : sclerae white  · Ears, Nose, Mouth and Throat  o Ears  o :   § External Ears  § : appearance within normal limits  § Hearing  § : intact  o Nose  o :   § External Nose  § : appearance normal  · Neck  o Inspection/Palpation  o : normal appearance  o Range of Motion  o : full range of motion  · Respiratory  o Respiratory Effort  o : breathing unlabored  o Inspection of Chest  o : normal appearance  o Auscultation of Lungs  o : no audible wheezing or rales  · Cardiovascular  o Heart  o : regular rate  · Gastrointestinal  o Abdominal Examination  o : soft and non-tender  · Skin and Subcutaneous Tissue  o General Inspection  o : intact, no rashes  · Psychiatric  o General  o : Alert and oriented x3  o Judgement and Insight  o : " judgment and insight intact  o Mood and Affect  o : mood normal, affect appropriate  · Left Knee  o Inspection  o : Well healed incisions. Mild swelling. Tender over pes anserine bursa. Full extension. Flexion 100 degrees. Patella well tracking. Calf supple/nontender. Negative Mike's. Neurovascularly intact.           Assessment  · Left Aftercare following surgery of the muskuloskeletal system     V54.81      Plan  · Medications  o Medications have been Reconciled  o Transition of Care or Provider Policy  · Instructions  o Reviewed the patient's Past Medical, Social, and Family history as well as the ROS at today's visit, no changes.  o Call or return if worsening symptoms.  o Voltaren gel prescribed for pes bursitis. Follow up PRN.  o Electronically Identified Patient Education Materials Provided Electronically            Electronically Signed by: MILO Sharif-MELINDA -Author on August 18, 2020 11:24:41 AM  Electronically Co-signed by: Issac Pierce MD -Reviewer on August 18, 2020 01:23:40 PM

## 2021-05-14 VITALS
HEIGHT: 62 IN | HEART RATE: 101 BPM | BODY MASS INDEX: 32.27 KG/M2 | SYSTOLIC BLOOD PRESSURE: 152 MMHG | OXYGEN SATURATION: 96 % | TEMPERATURE: 97.7 F | RESPIRATION RATE: 18 BRPM | WEIGHT: 175.37 LBS | DIASTOLIC BLOOD PRESSURE: 98 MMHG

## 2021-05-14 VITALS
BODY MASS INDEX: 32.39 KG/M2 | HEART RATE: 93 BPM | DIASTOLIC BLOOD PRESSURE: 90 MMHG | HEIGHT: 62 IN | SYSTOLIC BLOOD PRESSURE: 150 MMHG | OXYGEN SATURATION: 96 % | SYSTOLIC BLOOD PRESSURE: 150 MMHG | TEMPERATURE: 97.4 F | RESPIRATION RATE: 14 BRPM | WEIGHT: 176 LBS | DIASTOLIC BLOOD PRESSURE: 98 MMHG

## 2021-05-14 VITALS — WEIGHT: 180 LBS | OXYGEN SATURATION: 98 % | HEART RATE: 86 BPM | BODY MASS INDEX: 33.13 KG/M2 | HEIGHT: 62 IN

## 2021-05-14 VITALS — WEIGHT: 175 LBS | HEART RATE: 84 BPM | HEIGHT: 62 IN | BODY MASS INDEX: 32.2 KG/M2 | OXYGEN SATURATION: 98 %

## 2021-05-14 VITALS — SYSTOLIC BLOOD PRESSURE: 148 MMHG | DIASTOLIC BLOOD PRESSURE: 100 MMHG

## 2021-05-14 VITALS — DIASTOLIC BLOOD PRESSURE: 103 MMHG | SYSTOLIC BLOOD PRESSURE: 179 MMHG

## 2021-05-14 NOTE — PROGRESS NOTES
Progress Note      Patient Name: Yeimy Yang   Patient ID: [id number]   Sex: Female   YOB: 1945    Primary Care Provider: Anna BETANCOUTR   Referring Provider: Anna BETANCOURT    Visit Date: January 11, 2021    Provider: SERAFIN Calvin   Location: [facility]   Location Address: [address] [place] [address]  [id number]   Location Phone: [phone]          Chief Complaint  · discuss med refills      History Of Present Illness  TELEHEALTH TELEPHONE VISIT  Yeimy Yang is a 75 year old /White female who is presenting for evaluation via telehealth telephone visit. Verbal consent obtained before beginning visit.   Provider spent 15 minutes with patient during telehealth visit.   The following staff were present during this visit: gm   Past Medical History/Overview of Patient Symptoms  Yeimy Yang is a 75 year old /White female who presents for evaluation and treatment of:      she is in [place]. The weather has been cold but they have been doing good.     HTN- her bp has been much better controlled. Bps are 120s-140s. Denies shortness of breath, cp or palpitations. She sees her doctor down there tomorrow    anxiety/depression, doing better with zoloft, she still worries about her home while she is in [place]. She says her daugther who is living there is mad at her and won't answer the phone right now. She says overall she is doing ok.     They are trying to stay away from people as much as they can. When they go to [employer] they have the windows open and the fan on. They have been playing outside games like Litographs and shuffle board.       Past Medical History  Disease Name Date Onset Notes   Allergic rhinitis due to allergen 08/09/2018 --    Arthritis --  --    Carpal tunnel syndrome --  Right Wrist   Essential hypertension 08/09/2018 --    Fatigue 11/02/2020 --    Hyperlipemia --  --    Hyperlipidemia  --  --    Primary osteoarthritis of left knee 08/01/2017 --    Screening Mammogram 8/16/18 --    Seasonal allergies --  --    Shoulder pain --  Right shoulder   Stroke --  --    Tear of medial meniscus of left knee, current, unspecified tear type, initial encounter 08/01/2017 --    Trochanteric bursitis 08/24/2015 --          Past Surgical History  Procedure Name Date Notes   Colonoscopy 10/30/15 --    Hysterectomy --  --    Thyroidectomy --  --          Medication List  Name Date Started Instructions   amlodipine 2.5 mg oral tablet  take 1 tablet by oral route 2 times a day   Aspir-81 81 mg Oral Tablet, Delayed Release (E.C.)  take 1 tablet (81 mg) by oral route once daily   B12 lozenge  500mg daily   cetirizine 10 mg oral tablet 10/21/2020 TAKE ONE TABLET BY MOUTH ONCE DAILY   Estrace 0.01 % (0.1 mg/gram) vaginal cream 10/21/2020 insert 1 gram by vaginal route once weekly as needed   hydrocortisone acetate 25 mg rectal suppository 02/11/2020 insert 1 suppository (25 mg) by rectal route 2 times per day   losartan 50 mg oral tablet 12/31/2020 take 1 tablet (50 mg) by oral route once daily   multivitamin oral tablet  take 1 tablet by oral route daily   propranolol 20 mg oral tablet 01/11/2021 take 1 tablet by oral route 2 times a day for 30 days   simvastatin 10 mg oral tablet 10/21/2020 take 1 tablet by oral route daily for 90 days qd   Singulair 10 mg oral tablet 10/21/2020 take 1 tablet (10 mg) by oral route once daily in the evening for 90 days   Vitamin D3 5,000 unit oral tablet  take 1 tablet by oral route daily   Voltaren 1 % topical gel 08/18/2020 apply 4 grams to the affected area(s) by topical route 4 times per day   Zoloft 50 mg oral tablet 01/11/2021 take 1 tablet (50 mg) by oral route once daily         Allergy List  Allergen Name Date Reaction Notes   NO KNOWN DRUG ALLERGIES --  --  --        Allergies Reconciled  Family Medical History  Disease Name Relative/Age Notes   Lung Neoplasm, Malignant  --     Heart Disease Father/   Father  Mother   Cancer, Unspecified Brother/   Brother   Family history of certain chronic disabling diseases; arthritis Mother/   Mother   Family history of Arthritis Mother/   Mother         Social History  Finding Status Start/Stop Quantity Notes   Alcohol Use Never --/-- --  does not drink   Assessed for Abuse/Neglect --  --/-- --  Denies Abuse   Homemaker. --  --/-- --  09/13/2017 -     --  --/-- --  --    Recreational Drug Use Never --/-- --  no  09/13/2017 - no   Retired. --  --/-- --  --    Tobacco Never --/-- --  never smoker         Immunizations  NameDate Admin Mfg Trade Name Lot Number Route Inj VIS Given VIS Publication   Ivlhklwts88/09/2020 NE Not Entered  NE NE 10/09/2020    Comments: [facility]   Prevnar 1301/27/2016 [facility] PREVNAR 13 [id number] IM LD 01/27/2016 02/27/2013   Comments:    Niulqdag74/22/2019 NE Not Entered  NE NE 07/22/2019    Comments: [facility] 2nd dose   Jfzbhhqg09/13/2019 NE Not Entered  NE NE 05/13/2019    Comments: [facility] 1St dose   Rrpnfiuy87/17/2013 NE Not Entered  NE NE 10/17/2013    Comments:    Td07/09/2019 [facility] DECAVAC [id number] IM LD 07/09/2019    Comments: Pt tolerated well, left office in stable condition   Tdap06/13/2012 NE Not Entered  NE NE 06/18/2012    Comments:          Review of Systems  · Constitutional  o Denies  o : fever, fatigue, weight loss, weight gain  · Cardiovascular  o Denies  o : lower extremity edema, claudication, chest pressure, palpitations  · Respiratory  o Denies  o : shortness of breath, wheezing, cough, hemoptysis, dyspnea on exertion  · Gastrointestinal  o Denies  o : nausea, vomiting, diarrhea, constipation, abdominal pain  · Psychiatric  o Admits  o : anxiety, depression  o Denies  o : suicidal ideation, homicidal ideation              Assessment  · Depression     311/F32.9  · Essential hypertension     401.9/I10      Plan  · Orders  o Physician Telephone Evaluation, 11-20 minutes  "([id number]) - - 01/11/2021  o ACO-39: Current medications updated and reviewed (1159F, ) - - 01/11/2021  · Medications  o propranolol 20 mg oral tablet   SIG: take 1 tablet by oral route 2 times a day for 30 days   DISP: (60) Tablet with 2 refills  Refilled on 01/11/2021     o Zoloft 50 mg oral tablet   SIG: take 1 tablet (50 mg) by oral route once daily   DISP: (30) Tablet with 2 refills  Refilled on 01/11/2021     o Medications have been Reconciled  o Transition of Care or Provider Policy  · Instructions  o Patient was given an SSRI/SSNRI medication and warned of possible side effects of the medication including potential for increased risk of suicidal thoughts and feelings. Patient was instructed that if they begin to exhibit any of these effects they will discontinue the medication immediately and contact our office or the ER ASAP.  o Patient agrees to a \"No Self Harm\" contract. Patient will either call , [facility], , [facility], [facility].  o Patient advised to monitor blood pressure (B/P) at home and journal readings. Patient informed that a B/P reading at home of more than 130/80 is considered hypertension. For readings greater lxoi517/90 or higher patient is advised to follow up in the office with readings for management. Patient advised to limit sodium intake.  o Plan Of Care: continue current meds, take bp diary to her doctor's appt tomorrow, call with concerns  o Chronic conditions reviewed and taken into consideration for today's treatment plan.  o Patient instructed to seek medical attention urgently for new or worsening symptoms.  o Call the office with any concerns or questions.  · Disposition  o Call or Return if symptoms worsen or persist.  o F/u appt in 3 months            Electronically Signed by: SERAFIN Calvin -Author on January 11, 2021 03:08:38 PM  "

## 2021-05-14 NOTE — PROGRESS NOTES
Progress Note      Patient Name: Yeimy Yang   Patient ID: [id number]   Sex: Female   YOB: 1945    Primary Care Provider: Anna BETANCOURT   Referring Provider: Anna BETANCOURT    Visit Date: April 22, 2021    Provider: Issac Pierce MD   Location: [facility]   Location Address: [address] [place] [address]  [id number]   Location Phone: [phone]          Chief Complaint  · Left Knee Pain      History Of Present Illness  Yeimy Yang is a 76 year old /White female who presents today to [facility]. Patient presents today for left knee pain. She has history of a knee scope performed in July by Dr. Pierce. She notices that her pain is worse at night. She says it feels like a stabbing pain and is worse on the medial side. She was last seen in September where she got a steroid injection that didnt seem to help.       Past Medical History  Allergic rhinitis due to allergen; Arthritis; Carpal tunnel syndrome; Essential hypertension; Fatigue; Hyperlipemia; Hyperlipidemia; Primary osteoarthritis of left knee; Screening Mammogram; Seasonal allergies; Shoulder pain; Stroke; Tear of medial meniscus of left knee, current, unspecified tear type, initial encounter; Trochanteric bursitis         Past Surgical History  Colonoscopy; Hysterectomy; Thyroidectomy         Medication List  amlodipine 2.5 mg oral tablet; Aspir-81 81 mg Oral Tablet, Delayed Release (E.C.); B12 lozenge; cetirizine 10 mg oral tablet; Estrace 0.01 % (0.1 mg/gram) vaginal cream; hydrocortisone acetate 25 mg rectal suppository; losartan 50 mg oral tablet; multivitamin oral tablet; propranolol 20 mg oral tablet; sertraline 50 mg oral tablet; simvastatin 10 mg oral tablet; Singulair 10 mg oral tablet; Vitamin D3 5,000 unit oral tablet; Zoloft 50 mg oral tablet         Allergy List  NO KNOWN DRUG ALLERGIES         Family Medical History  Lung Neoplasm, Malignant; Heart Disease;  "Cancer, Unspecified; Family history of certain chronic disabling diseases; arthritis; Family history of Arthritis         Social History  Alcohol Use (Never); Assessed for Abuse/Neglect; Homemaker.; ; Recreational Drug Use (Never); Retired.; Tobacco (Never)         Review of Systems  · Constitutional  o Denies  o : fever, chills, weight loss  · Cardiovascular  o Denies  o : chest pain, shortness of breath  · Gastrointestinal  o Denies  o : liver disease, heartburn, nausea, blood in stools  · Genitourinary  o Denies  o : painful urination, blood in urine  · Integument  o Denies  o : rash, itching  · Neurologic  o Denies  o : headache, weakness, loss of consciousness  · Musculoskeletal  o Denies  o : painful, swollen joints  · Psychiatric  o Denies  o : drug/alcohol addiction, anxiety, depression      Vitals  Date Time BP Position Site L\R Cuff Size HR RR TEMP (F) WT  HT  BMI kg/m2 BSA m2 O2 Sat FR L/min FiO2        04/22/2021 03:28 PM      84 - R   174lbs 16oz 5'  2\" 32.01 1.86 98 %            Physical Examination  · Constitutional  o Appearance  o : well developed, well-nourished, no obvious deformities present  · Head and Face  o Head  o :   § Inspection  § : normocephalic  o Face  o :   § Inspection  § : no facial lesions  · Eyes  o Conjunctivae  o : conjunctivae normal  o Sclerae  o : sclerae white  · Ears, Nose, Mouth and Throat  o Ears  o :   § External Ears  § : appearance within normal limits  § Hearing  § : intact  o Nose  o :   § External Nose  § : appearance normal  · Neck  o Inspection/Palpation  o : normal appearance  o Range of Motion  o : full range of motion  · Respiratory  o Respiratory Effort  o : breathing unlabored  o Inspection of Chest  o : normal appearance  o Auscultation of Lungs  o : no audible wheezing or rales  · Cardiovascular  o Heart  o : regular rate  · Gastrointestinal  o Abdominal Examination  o : soft and non-tender  · Skin and Subcutaneous Tissue  o General Inspection  o : " intact, no rashes  · Psychiatric  o General  o : Alert and oriented x3  o Judgement and Insight  o : judgment and insight intact  o Mood and Affect  o : mood normal, affect appropriate  · Left Knee  o Inspection  o : She has normal flexion and extension. She is tender around the medial joint line. Nontender around the lateral joint line. Negative McMurrays test. Negative Lachman. Negative anterior drawer. Stable varus/valgus stress. Neurovascularly intact. Sensation intact. Posterior tibialis pulses 2+.   · Injection Note/Aspiration Note  o Site  o : left knee   o Procedure  o : Procedure: After educating the patient, patient gave consent for procedure. After using Chloraprep, the joint space was injected. The patient tolerated the procedure well.   o Medication  o : 80 mg of DepoMedrol with 9cc of 1% Lidocaine  · In Office Procedures  o View  o : LAT/SUNRISE/STANDING   o Site  o : left, knee  o Indication  o : Left knee pain   o Study  o : X-rays ordered, taken in the office, and reviewed today.  o Xray  o : moderate to advanced osteoarthritis   o Comparative Data  o : Comparative Data found and reviewed today          Assessment  · Primary osteoarthritis of left knee     715.16/M17.12      Plan  · Orders  o Depo-Medrol injection 80mg () - 715.16/M17.12 - 04/22/2021   Lot [id number] manufactured by [employer] 02 2022  o Knee Intra-articular Injection without US Guidance [facility] ([id number]) - 715.16/M17.12 - 04/22/2021   Lot [id number] manufactured by [employer] 12 2022 Administered by Issac Pierce MD  o Knee (Left) 3 views X-Ray [facility] Preferred View ([id number]) - 715.16/M17.12 - 04/22/2021  · Medications  o Medications have been Reconciled  o Transition of Care or Provider Policy  · Instructions  o Reviewed the patient's Past Medical, Social, and Family history as well as the ROS at today's visit, no changes.  o Call or return if worsening symptoms.  o Discussed surgery.  o Risks/benefits discussed with patient including, but not  limited to: infection, bleeding, neurovascular damage, malunion, nonunion, aesthetic deformity, need for further surgery, and death.  o Discussed with patient the implant type being used during surgery and patient understands and desires to proceed.  o This note is transcribed by Martha lewis/star  o Discussed the treatment options with the patient, operative vs non-operative. Discussed the risks and benefits of a right Total Knee Arthroplasty. The patient expressed understanding and wished to proceed. Follow up 2 weeks post-operatively.   o Electronically Identified Patient Education Materials Provided Electronically            Electronically Signed by: Martha Russell, -Author on April 28, 2021 10:45:05 AM  Electronically Co-signed by: Ting Nathan PA-C -Reviewer on May 11, 2021 12:47:48 PM  Electronically Co-signed by: Issac Pierce MD -Reviewer on May 12, 2021 10:01:52 PM

## 2021-05-15 VITALS — OXYGEN SATURATION: 97 % | HEIGHT: 62 IN | HEART RATE: 79 BPM | WEIGHT: 175 LBS | BODY MASS INDEX: 32.2 KG/M2

## 2021-05-15 VITALS
RESPIRATION RATE: 16 BRPM | BODY MASS INDEX: 31.87 KG/M2 | TEMPERATURE: 97.3 F | HEIGHT: 62 IN | DIASTOLIC BLOOD PRESSURE: 75 MMHG | SYSTOLIC BLOOD PRESSURE: 117 MMHG | HEART RATE: 85 BPM | WEIGHT: 173.19 LBS | OXYGEN SATURATION: 97 %

## 2021-05-15 VITALS
DIASTOLIC BLOOD PRESSURE: 88 MMHG | BODY MASS INDEX: 32.59 KG/M2 | HEIGHT: 62 IN | RESPIRATION RATE: 12 BRPM | OXYGEN SATURATION: 97 % | HEART RATE: 85 BPM | TEMPERATURE: 97.9 F | SYSTOLIC BLOOD PRESSURE: 127 MMHG | WEIGHT: 177.12 LBS

## 2021-05-15 VITALS
HEIGHT: 62 IN | SYSTOLIC BLOOD PRESSURE: 131 MMHG | OXYGEN SATURATION: 91 % | DIASTOLIC BLOOD PRESSURE: 80 MMHG | HEART RATE: 83 BPM | BODY MASS INDEX: 32.2 KG/M2 | WEIGHT: 175 LBS | TEMPERATURE: 97.5 F

## 2021-05-15 VITALS
DIASTOLIC BLOOD PRESSURE: 99 MMHG | HEIGHT: 61 IN | BODY MASS INDEX: 33.64 KG/M2 | HEART RATE: 81 BPM | OXYGEN SATURATION: 98 % | TEMPERATURE: 97.2 F | RESPIRATION RATE: 12 BRPM | WEIGHT: 178.19 LBS | SYSTOLIC BLOOD PRESSURE: 181 MMHG

## 2021-05-15 VITALS
WEIGHT: 173.37 LBS | DIASTOLIC BLOOD PRESSURE: 74 MMHG | RESPIRATION RATE: 16 BRPM | SYSTOLIC BLOOD PRESSURE: 127 MMHG | OXYGEN SATURATION: 98 % | TEMPERATURE: 97.9 F | HEART RATE: 96 BPM | HEIGHT: 62 IN | BODY MASS INDEX: 31.9 KG/M2

## 2021-05-15 VITALS — BODY MASS INDEX: 33.61 KG/M2 | OXYGEN SATURATION: 98 % | HEIGHT: 61 IN | WEIGHT: 178 LBS | HEART RATE: 82 BPM

## 2021-05-15 VITALS
SYSTOLIC BLOOD PRESSURE: 130 MMHG | TEMPERATURE: 97.3 F | DIASTOLIC BLOOD PRESSURE: 78 MMHG | HEIGHT: 62 IN | OXYGEN SATURATION: 98 % | WEIGHT: 173.06 LBS | BODY MASS INDEX: 31.85 KG/M2 | RESPIRATION RATE: 16 BRPM | HEART RATE: 83 BPM

## 2021-05-15 VITALS — BODY MASS INDEX: 34.05 KG/M2 | WEIGHT: 180.37 LBS | HEIGHT: 61 IN | HEART RATE: 86 BPM | OXYGEN SATURATION: 98 %

## 2021-05-15 VITALS — HEIGHT: 62 IN | BODY MASS INDEX: 32.2 KG/M2 | HEART RATE: 81 BPM | OXYGEN SATURATION: 97 % | WEIGHT: 175 LBS

## 2021-05-16 VITALS
WEIGHT: 178.25 LBS | RESPIRATION RATE: 16 BRPM | TEMPERATURE: 98.3 F | SYSTOLIC BLOOD PRESSURE: 126 MMHG | HEIGHT: 62 IN | BODY MASS INDEX: 32.8 KG/M2 | DIASTOLIC BLOOD PRESSURE: 73 MMHG | OXYGEN SATURATION: 95 % | HEART RATE: 87 BPM

## 2021-05-16 VITALS — HEIGHT: 62 IN | OXYGEN SATURATION: 97 % | HEART RATE: 84 BPM | WEIGHT: 180 LBS | BODY MASS INDEX: 33.13 KG/M2

## 2021-05-16 VITALS
RESPIRATION RATE: 12 BRPM | BODY MASS INDEX: 32.95 KG/M2 | HEART RATE: 80 BPM | TEMPERATURE: 97.3 F | DIASTOLIC BLOOD PRESSURE: 82 MMHG | SYSTOLIC BLOOD PRESSURE: 141 MMHG | WEIGHT: 179.06 LBS | OXYGEN SATURATION: 96 % | HEIGHT: 62 IN

## 2021-05-16 VITALS
HEART RATE: 83 BPM | WEIGHT: 180.44 LBS | TEMPERATURE: 97.8 F | DIASTOLIC BLOOD PRESSURE: 58 MMHG | RESPIRATION RATE: 16 BRPM | OXYGEN SATURATION: 97 % | BODY MASS INDEX: 33.21 KG/M2 | HEIGHT: 62 IN | SYSTOLIC BLOOD PRESSURE: 126 MMHG

## 2021-05-19 ENCOUNTER — OFFICE VISIT CONVERTED (OUTPATIENT)
Dept: SURGERY | Facility: CLINIC | Age: 76
End: 2021-05-19
Attending: NURSE PRACTITIONER

## 2021-05-25 ENCOUNTER — PATIENT OUTREACH - CONVERTED (OUTPATIENT)
Dept: FAMILY MEDICINE CLINIC | Facility: CLINIC | Age: 76
End: 2021-05-25
Attending: NURSE PRACTITIONER

## 2021-06-05 NOTE — H&P
History and Physical      Patient Name: Yeimy Yang   Patient ID: [id number]   Sex: Female   YOB: 1945    Primary Care Provider: Anna BETANCOURT   Referring Provider: Anna BETANCOURT    Visit Date: May 19, 2021    Provider: SERAFIN Mosley   Location: [facility]   Location Address: [address] [place] [address]  [id number]   Location Phone: [phone]          Chief Complaint  · Requesting colonoscopy  · Age 50 or over  · Here today for a pre-surgical colon screening visit  · Personal History of Polyps      History Of Present Illness  The patient is a 76 year old /White female presenting to the Surgical Specialist office on a referral from Anna BETANCOURT.   Yeimy Yang needs to have a screening colonoscopy.   Patient states that they have had a colonoscopy. 6 years ago   Patient currently complains of: no complaints   Patient Does not have family history of colon cancer.      Patient presents today on a referral from Dr. Jameel Lopez. Patient presents today without complaints for a screening colonoscopy. She denies any abdominal pain, diarrhea or rectal bleeding. Denies any family history of colorectal cancer. Admits to history of colonic polyps.    10/15: Colonoscopy (Jessica): Descending - tubular adenoma.     4/11: Colonoscopy (Jessica): Hemorrhoids, otherwise normal.       Past Medical History  Disease Name Date Onset Notes   Allergic rhinitis due to allergen 08/09/2018 --    Arthritis 05/05/2021 --    Arthritis --  --    Carpal tunnel syndrome --  Right Wrist   Essential hypertension 08/09/2018 --    Fatigue 11/02/2020 --    Hyperlipidemia --  --    Primary osteoarthritis of left knee 08/01/2017 --    Screening Mammogram 8/16/18 --    Seasonal allergies --  --    Shoulder pain --  Right shoulder   Stroke --  --    Tear of medial meniscus of left knee, current, unspecified tear type, initial encounter 08/01/2017 --     Trochanteric bursitis 08/24/2015 --          Past Surgical History  Procedure Name Date Notes   Colonoscopy 10/30/15 --    Hysterectomy --  --    Thyroidectomy --  --          Medication List  Name Date Started Instructions   amlodipine 2.5 mg oral tablet 05/05/2021 take 1 tablet by oral route 2 times a day for 90 days   Aspir-81 81 mg Oral Tablet, Delayed Release (E.C.)  take 1 tablet (81 mg) by oral route once daily   B12 lozenge  500mg daily   cetirizine 10 mg oral tablet 05/05/2021 TAKE ONE TABLET BY MOUTH ONCE DAILY   hydrocortisone acetate 25 mg rectal suppository 02/11/2020 insert 1 suppository (25 mg) by rectal route 2 times per day   lidocaine 5 % topical cream 05/05/2021 apply to affected area(s) by topical route 3 times a day   losartan 50 mg oral tablet 05/06/2021 take 1 tablet (50 mg) by oral route once daily   multivitamin oral tablet  take 1 tablet by oral route daily   propranolol 20 mg oral tablet 05/05/2021 take 1 tablet by oral route 2 times a day for 90 days   simvastatin 10 mg oral tablet 05/05/2021 take 1 tablet by oral route daily for 90 days qd   Singulair 10 mg oral tablet 05/05/2021 take 1 tablet (10 mg) by oral route once daily in the evening for 90 days   Vitamin D3 5,000 unit oral tablet  take 1 tablet by oral route daily   Zoloft 50 mg oral tablet 05/05/2021 take 1 tablet (50 mg) by oral route once daily         Allergy List  Allergen Name Date Reaction Notes   NO KNOWN DRUG ALLERGIES --  --  --        Allergies Reconciled  Family Medical History  Disease Name Relative/Age Notes   Lung Neoplasm, Malignant  --    Heart Disease Father/   Father  Mother   Cancer, Unspecified Brother/   Brother   Family history of certain chronic disabling diseases; arthritis Mother/   Mother   Family history of Arthritis Mother/   Mother         Social History  Finding Status Start/Stop Quantity Notes   Alcohol Use Never --/-- --  does not drink   Assessed for Abuse/Neglect --  --/-- --  Denies Abuse  "  Homemaker. --  --/-- --  09/13/2017 -     --  --/-- --  --    Recreational Drug Use Never --/-- --  no  09/13/2017 - no   Retired. --  --/-- --  --    Tobacco Never --/-- --  never smoker         Review of Systems  · Constitutional  o Denies  o : fever, chills  · Eyes  o Denies  o : yellowish discoloration of eyes  · HENT  o Denies  o : difficulty swallowing  · Cardiovascular  o Denies  o : chest pain, chest pain on exertion  · Respiratory  o Denies  o : shortness of breath  · Gastrointestinal  o Denies  o : nausea, vomiting, diarrhea, constipation  · Genitourinary  o Denies  o : abnormal color of urine  · Integument  o Denies  o : rash  · Neurologic  o Denies  o : tingling or numbness  · Musculoskeletal  o Denies  o : joint pain  · Endocrine  o Denies  o : weight gain, weight loss      Vitals  Date Time BP Position Site L\R Cuff Size HR RR TEMP (F) WT  HT  BMI kg/m2 BSA m2 O2 Sat FR L/min FiO2        05/19/2021 09:24 AM       14  175lbs 2oz 5'  2\" 32.03 1.86             Physical Examination  · Constitutional  o Appearance  o : well developed, well-nourished, patient in no apparent distress  · Head and Face  o Head  o :   § Inspection  § : atraumatic, normocephalic  o Face  o :   § Inspection  § : no facial lesions  · Eyes  o Conjunctivae  o : conjunctivae normal  o Sclerae  o : sclerae white  · Neck  o Inspection/Palpation  o : normal appearance, no masses or tenderness, trachea midline  · Respiratory  o Respiratory Effort  o : breathing unlabored  · Skin and Subcutaneous Tissue  o General Inspection  o : no lesions present, no areas of discoloration, skin turgor normal, texture normal  · Neurologic  o Mental Status Examination  o :   § Orientation  § : grossly oriented to person, place and time  § Attention  § : attention normal, concentration abilities normal  § Fund of Knowledge  § : fund of knowledge within normal limits, patient aware of current events  o Gait and Station  o : normal gait, able to " stand without difficulty  · Psychiatric  o Judgement and Insight  o : judgment and insight intact  o Mood and Affect  o : mood normal, affect appropriate              Assessment  · Personal history of colonic polyps     V12.72/Z86.010  · Screening for colon cancer     V76.51/Z12.11    Problems Reconciled  Plan  · Orders  o Consent for Colonoscopy Screening-Possible risk/complications, benefits, and alternatives to surgical or invasive procedure have been explained to patient and/or legal guardian. -Patient has been evaluated and can tolerate anesthesia and/or sedation. Risks, benefits, and alternatives to anesthesia and sedation have been explained to patient and/or legal guardian. () - V76.51/Z12.11, V12.72/Z86.010 - 06/16/2021  · Medications  o Medications have been Reconciled  o Transition of Care or Provider Policy  · Instructions  o Surgical Facility: [facility]  o Handouts Provided Pre-Procedure Instructions including date, time, and location of procedure.   o PLAN: Proceeed with colonoscopy. Patient understands risks/benefits and is willing to proceed.   o ***Surgical Orders***  o RISK AND BENEFITS:  o Given these options, the patient has verbally expressed an understanding of the risks of the surgery and finds these risks acceptable. Will proceed with surgery as soon as possible.  o O.R. PREP: Per protocol   o IV: Per Anesthesia  o Please sign permit for: Colonoscopy with possible biopsies by Dr. Dawn.   o The above History and Physical Examination has been completed within 30 days of admission.  o ***Patient Status***  o Outpatient  o Follow up in the in the office post procedure.  o Electronically Identified Patient Education Materials Provided Electronically  · Disposition  o EMR dragon/transcription disclaimer: Much of this encounter note is an electronic transcription/translation of spoken language to printed text. Electronic translation of spoken language may permit erroneous, or  at times nonsensical words or phrases to be inadvertently trasncribed; although I have reviewed the note for such errors, some may still exist.            Electronically Signed by: SERAFIN Mosley -Author on May 19, 2021 09:46:50 AM

## 2021-06-05 NOTE — OUTREACH NOTE
Quick Note      Patient Name: Yeimy Yang   Patient ID: [id number]   Sex: Female   YOB: 1945    Primary Care Provider: Anna BETANCOURT   Referring Provider: Anna BETANCOURT    Visit Date: May 25, 2021    Provider: SERAFIN Gross   Location: [facility]   Location Address: [address] [place] [address]  [id number]   Location Phone: [phone]          History Of Present Illness     CCM     TaskID: [id number]    Task Subject: CCM notes May 2021    Task Comments:    Date: May 12 2021  4:05PM     Creator: karel  Per discussion with PCP removed Lipid profile lab as we will be in EPIC charting at that time. (2 minutes)    Date: May 12 2021  3:58PM     Creator: karel  Monthly plan of care for CCM initiated and will be complete of program as all goals met. Patient and provider agree for stopping CCM. (7 minutes charting)    Date: May 12 2021  3:49PM     Creator: karel  Discussed with PCP and she is aware patient and  are stopping services. 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 I placed verbal orders for the reprat 3 month lipid panel. PCP aware as she gave orders. Patient reports she got her second COVID shot and is having a headache and arm pain today and having shivers and shakes. Instructed to monitor her temperaure and take tylenol or ibuprofen, she is doing this.  (5 minutes)    Date: May 12 2021  1:50PM     Creator: karel  Labs reviewed with patient at CCM call and she is instructed on low cholesterol and low carb diet to lower her lipids and she is aware to get repeat labs in 3 months per PCP instructions. She is not needing any further CCM assistance at this time and wanting to stop program. (6 minutes)    Date: May 11 2021  3:14PM     Creator: karel  Per review of visit note from 5/5/2021 patient is doing well but her B/P at office fos 150/90 and rechecked  at 147/87 and no medication changes. She is to monitor at home and let PCP know how it is doing with a log. She is to have labs done. She is to return in 6 months and sooner wi call to let PCP know how left shoulder is doing, she reported shocking pains in it and lidocaine gel ordered. She had labs ordered but are still pending. (chart reiew 9 minutes)                   Electronically Signed by: Zonia Rivas RN -Author on May 25, 2021 04:16:09 PM

## 2021-06-05 NOTE — OUTREACH NOTE
Quick Note      Patient Name: Yeimy Yang   Patient ID: [id number]   Sex: Female   YOB: 1945    Primary Care Provider: Anna BETANCOURT   Referring Provider: Anna BETANCOURT    Visit Date: May 25, 2021    Provider: SERAFIN Gross   Location: [facility]   Location Address: [address] [place] [address]  [id number]   Location Phone: [phone]          History Of Present Illness  CCM Comprehensive Care Plan    This Chronic Medical Management Care Plan for Yeimy Yang, 76 year old /White female, has been monitored and managed, reviewed, revised, and completed for the month of May. A cumulative time of 29 minutes was spent on this patient record, including face to face with provider, phone call with patient, electronic communication with primary care provider, and chart review.   Regarding the patient's diagnoses Arthritis, Essential hypertension, and Hyperlipidemia, the following items were adressed: medical records and medications and any changes can be found within the plan section of the note. A detailed listing of time spent for chronic care management has been scanned into the patient's electronic record. Current medications include: amlodipine 2.5 mg oral tablet, Aspir-81 81 mg Oral Tablet, Delayed Release (E.C.), B12 lozenge, cetirizine 10 mg oral tablet, hydrocortisone acetate 25 mg rectal suppository, lidocaine 5 % topical cream, losartan 50 mg oral tablet, multivitamin oral tablet, propranolol 20 mg oral tablet, simvastatin 10 mg oral tablet, Singulair 10 mg oral tablet, Vitamin D3 5,000 unit oral tablet, and Zoloft 50 mg oral tablet and the patient is reported to be compliant with medication protocol. Medications are reported to be effective.   This patient's physical needs are currently being met.   Patient's mental support needs are currently being met.   The patient's cognitive support needs are currently  being met.   The patient's psychosocial support needs include:, contact information and coordination of community providers. Patient doing well, blood pressures well managed, labs need repeat in 3 months and she is aware of need to modify diet and reviewed appropriate food choices with her.   This patient's functional needs are N/A.   This patient's environmental needs are N/A.      all ACO gaps closed           Assessment  · Chronic Care Management (CCM)     V68.89/Z02.89  · Arthritis     716.90/M19.90  · Essential hypertension     401.9/I10  · Hyperlipidemia     272.4/E78.5      Plan  · Instructions  o Patient's Health Care Goals: Goals met and completed CCM program.  o Provider's Health Care Goals: Goals met for CCM program.  o Patient was provided an electronic copy of care plan  o CCM services were explained and offered and patient has accepted these services.  o Patient has given their written consent to recieve CCM services and understands that this includes the authorization of electronic communication of medical information with other treating providers.  o Patient understands that they may stop CCM services at any time and these changes will be effective at the end of the calendar month and will effectively revocate the agreement of CCM services.  o Patient understands that only one practioner can furnish and be paid for CCM services during one calendar month. Patient also understands that there may be co-payment or deductible fees in association with CCM services.  o Patient will continue with at least monthly follow-up calls with the Nurse Navigator.  · Associate Tasks  o Task ID [id number] CCM: CCM notes May 2021  o Task ID [id number] Request for Co-Signature: Request for co-signature: Orders Note for Yeimy Yang ([id number])            Electronically Signed by: Zonia Rivas RN -Author on May 25, 2021 04:15:31 PM

## 2021-06-05 NOTE — H&P (VIEW-ONLY)
History and Physical      Patient Name: Yeimy Yang   Patient ID: [id number]   Sex: Female   YOB: 1945    Primary Care Provider: Anna BETANCOURT   Referring Provider: Anna BETANCOURT    Visit Date: May 19, 2021    Provider: SERAFIN Mosley   [place] [facility]   Location Address: [address] [place] [address]  [id number]   Location Phone: [phone]          Chief Complaint  · Requesting colonoscopy  · Age 50 or over  · Here today for a pre-surgical colon screening visit  · Personal History of Polyps      History Of Present Illness  The patient is a 76 year old /White female presenting to the Surgical Specialist office on a referral from Anna BETANCOURT.   Yeimy Yang needs to have a screening colonoscopy.   Patient states that they have had a colonoscopy. 6 years ago   Patient currently complains of: no complaints   Patient Does not have family history of colon cancer.      Patient presents today on a referral from Dr. Jameel Lopez. Patient presents today without complaints for a screening colonoscopy. She denies any abdominal pain, diarrhea or rectal bleeding. Denies any family history of colorectal cancer. Admits to history of colonic polyps.    10/15: Colonoscopy (Jessica): Descending - tubular adenoma.     4/11: Colonoscopy (Jessica): Hemorrhoids, otherwise normal.       Past Medical History  Disease Name Date Onset Notes   Allergic rhinitis due to allergen 08/09/2018 --    Arthritis 05/05/2021 --    Arthritis --  --    Carpal tunnel syndrome --  Right Wrist   Essential hypertension 08/09/2018 --    Fatigue 11/02/2020 --    Hyperlipidemia --  --    Primary osteoarthritis of left knee 08/01/2017 --    Screening Mammogram 8/16/18 --    Seasonal allergies --  --    Shoulder pain --  Right shoulder   Stroke --  --    Tear of medial meniscus of left knee, current, unspecified tear type, initial encounter 08/01/2017 --     Trochanteric bursitis 08/24/2015 --          Past Surgical History  Procedure Name Date Notes   Colonoscopy 10/30/15 --    Hysterectomy --  --    Thyroidectomy --  --          Medication List  Name Date Started Instructions   amlodipine 2.5 mg oral tablet 05/05/2021 take 1 tablet by oral route 2 times a day for 90 days   Aspir-81 81 mg Oral Tablet, Delayed Release (E.C.)  take 1 tablet (81 mg) by oral route once daily   B12 lozenge  500mg daily   cetirizine 10 mg oral tablet 05/05/2021 TAKE ONE TABLET BY MOUTH ONCE DAILY   hydrocortisone acetate 25 mg rectal suppository 02/11/2020 insert 1 suppository (25 mg) by rectal route 2 times per day   lidocaine 5 % topical cream 05/05/2021 apply to affected area(s) by topical route 3 times a day   losartan 50 mg oral tablet 05/06/2021 take 1 tablet (50 mg) by oral route once daily   multivitamin oral tablet  take 1 tablet by oral route daily   propranolol 20 mg oral tablet 05/05/2021 take 1 tablet by oral route 2 times a day for 90 days   simvastatin 10 mg oral tablet 05/05/2021 take 1 tablet by oral route daily for 90 days qd   Singulair 10 mg oral tablet 05/05/2021 take 1 tablet (10 mg) by oral route once daily in the evening for 90 days   Vitamin D3 5,000 unit oral tablet  take 1 tablet by oral route daily   Zoloft 50 mg oral tablet 05/05/2021 take 1 tablet (50 mg) by oral route once daily         Allergy List  Allergen Name Date Reaction Notes   NO KNOWN DRUG ALLERGIES --  --  --        Allergies Reconciled  Family Medical History  Disease Name Relative/Age Notes   Lung Neoplasm, Malignant  --    Heart Disease Father/   Father  Mother   Cancer, Unspecified Brother/   Brother   Family history of certain chronic disabling diseases; arthritis Mother/   Mother   Family history of Arthritis Mother/   Mother         Social History  Finding Status Start/Stop Quantity Notes   Alcohol Use Never --/-- --  does not drink   Assessed for Abuse/Neglect --  --/-- --  Denies Abuse  "  Homemaker. --  --/-- --  09/13/2017 -     --  --/-- --  --    Recreational Drug Use Never --/-- --  no  09/13/2017 - no   Retired. --  --/-- --  --    Tobacco Never --/-- --  never smoker         Review of Systems  · Constitutional  o Denies  o : fever, chills  · Eyes  o Denies  o : yellowish discoloration of eyes  · HENT  o Denies  o : difficulty swallowing  · Cardiovascular  o Denies  o : chest pain, chest pain on exertion  · Respiratory  o Denies  o : shortness of breath  · Gastrointestinal  o Denies  o : nausea, vomiting, diarrhea, constipation  · Genitourinary  o Denies  o : abnormal color of urine  · Integument  o Denies  o : rash  · Neurologic  o Denies  o : tingling or numbness  · Musculoskeletal  o Denies  o : joint pain  · Endocrine  o Denies  o : weight gain, weight loss      Vitals  Date Time BP Position Site L\R Cuff Size HR RR TEMP (F) WT  HT  BMI kg/m2 BSA m2 O2 Sat FR L/min FiO2        05/19/2021 09:24 AM       14  175lbs 2oz 5'  2\" 32.03 1.86             Physical Examination  · Constitutional  o Appearance  o : well developed, well-nourished, patient in no apparent distress  · Head and Face  o Head  o :   § Inspection  § : atraumatic, normocephalic  o Face  o :   § Inspection  § : no facial lesions  · Eyes  o Conjunctivae  o : conjunctivae normal  o Sclerae  o : sclerae white  · Neck  o Inspection/Palpation  o : normal appearance, no masses or tenderness, trachea midline  · Respiratory  o Respiratory Effort  o : breathing unlabored  · Skin and Subcutaneous Tissue  o General Inspection  o : no lesions present, no areas of discoloration, skin turgor normal, texture normal  · Neurologic  o Mental Status Examination  o :   § Orientation  § : grossly oriented to person, place and time  § Attention  § : attention normal, concentration abilities normal  § Fund of Knowledge  § : fund of knowledge within normal limits, patient aware of current events  o Gait and Station  o : normal gait, able to " stand without difficulty  · Psychiatric  o Judgement and Insight  o : judgment and insight intact  o Mood and Affect  o : mood normal, affect appropriate              Assessment  · Personal history of colonic polyps     V12.72/Z86.010  · Screening for colon cancer     V76.51/Z12.11    Problems Reconciled  Plan  · Orders  o Consent for Colonoscopy Screening-Possible risk/complications, benefits, and alternatives to surgical or invasive procedure have been explained to patient and/or legal guardian. -Patient has been evaluated and can tolerate anesthesia and/or sedation. Risks, benefits, and alternatives to anesthesia and sedation have been explained to patient and/or legal guardian. () - V76.51/Z12.11, V12.72/Z86.010 - 06/16/2021  · Medications  o Medications have been Reconciled  o Transition of Care or Provider Policy  · Instructions  o Surgical Facility: [facility]  o Handouts Provided Pre-Procedure Instructions including date, time, and location of procedure.   o PLAN: Proceeed with colonoscopy. Patient understands risks/benefits and is willing to proceed.   o ***Surgical Orders***  o RISK AND BENEFITS:  o Given these options, the patient has verbally expressed an understanding of the risks of the surgery and finds these risks acceptable. Will proceed with surgery as soon as possible.  o O.R. PREP: Per protocol   o IV: Per Anesthesia  o Please sign permit for: Colonoscopy with possible biopsies by Dr. Dawn.   o The above History and Physical Examination has been completed within 30 days of admission.  o ***Patient Status***  o Outpatient  o Follow up in the in the office post procedure.  o Electronically Identified Patient Education Materials Provided Electronically  · Disposition  o EMR dragon/transcription disclaimer: Much of this encounter note is an electronic transcription/translation of spoken language to printed text. Electronic translation of spoken language may permit erroneous, or  at times nonsensical words or phrases to be inadvertently trasncribed; although I have reviewed the note for such errors, some may still exist.            Electronically Signed by: SERAFIN Mosley -Author on May 19, 2021 09:46:50 AM

## 2021-06-05 NOTE — PROGRESS NOTES
"   Progress Note      Patient Name: Yeimy Yang   Patient ID: [id number]   Sex: Female   YOB: 1945    Primary Care Provider: Anna BETANCOURT   Referring Provider: Anna BETANCOURT    Visit Date: May 5, 2021    Provider: SERAFIN Gross   Location: [facility]   Location Address: [address] [place] [address]  [id number]   Location Phone: [phone]          Chief Complaint     6 month follow up HTN       History Of Present Illness  Yeimy Yang is a 76 year old /White female who presents for evaluation and treatment of:      Here for refills   HTN: She is doing well with current losartan, propranolol, Norvasc her check BP is running 's/70-80.    LIPIDS: She is doing well with current simvastatin  Allergies:  She is taking her singular.  She is doing good overall.    Anxiety:  She felt fine in [place] on the Zoloft.  She rides bikes and talks to neighbors.  She is doing ok but there is a lot of stress with family.    She is going to see for the colonoscopy and she is going to get the left knee done too.    She started about 1-2 week ago on the left upper shower she feels that has \"electric shock to the left shoulder\".  She has been putting a cream but the that didn't help.  SHe doesn't know what is going on.       Past Medical History  Disease Name Date Onset Notes   Allergic rhinitis due to allergen 08/09/2018 --    Arthritis --  --    Carpal tunnel syndrome --  Right Wrist   Essential hypertension 08/09/2018 --    Fatigue 11/02/2020 --    Hyperlipidemia --  --    Primary osteoarthritis of left knee 08/01/2017 --    Screening Mammogram 8/16/18 --    Seasonal allergies --  --    Shoulder pain --  Right shoulder   Stroke --  --    Tear of medial meniscus of left knee, current, unspecified tear type, initial encounter 08/01/2017 --    Trochanteric bursitis 08/24/2015 --          Past Surgical History  Procedure Name " Date Notes   Colonoscopy 10/30/15 --    Hysterectomy --  --    Thyroidectomy --  --          Medication List  Name Date Started Instructions   amlodipine 2.5 mg oral tablet  take 1 tablet by oral route 2 times a day   Aspir-81 81 mg Oral Tablet, Delayed Release (E.C.)  take 1 tablet (81 mg) by oral route once daily   B12 lozenge  500mg daily   cetirizine 10 mg oral tablet 05/05/2021 TAKE ONE TABLET BY MOUTH ONCE DAILY   hydrocortisone acetate 25 mg rectal suppository 02/11/2020 insert 1 suppository (25 mg) by rectal route 2 times per day   losartan 50 mg oral tablet 12/31/2020 take 1 tablet (50 mg) by oral route once daily   multivitamin oral tablet  take 1 tablet by oral route daily   propranolol 20 mg oral tablet 05/05/2021 take 1 tablet by oral route 2 times a day for 90 days   sertraline 50 mg oral tablet  take 1 tablet (50 mg) by oral route once daily   simvastatin 10 mg oral tablet 05/05/2021 take 1 tablet by oral route daily for 90 days qd   Singulair 10 mg oral tablet 05/05/2021 take 1 tablet (10 mg) by oral route once daily in the evening for 90 days   Vitamin D3 5,000 unit oral tablet  take 1 tablet by oral route daily   Zoloft 50 mg oral tablet 05/05/2021 take 1 tablet (50 mg) by oral route once daily         Allergy List  Allergen Name Date Reaction Notes   NO KNOWN DRUG ALLERGIES --  --  --        Allergies Reconciled  Family Medical History  Disease Name Relative/Age Notes   Lung Neoplasm, Malignant  --    Heart Disease Father/   Father  Mother   Cancer, Unspecified Brother/   Brother   Family history of certain chronic disabling diseases; arthritis Mother/   Mother   Family history of Arthritis Mother/   Mother         Social History  Finding Status Start/Stop Quantity Notes   Alcohol Use Never --/-- --  does not drink   Assessed for Abuse/Neglect --  --/-- --  Denies Abuse   Homemaker. --  --/-- --  09/13/2017 -     --  --/-- --  --    Recreational Drug Use Never --/-- --  no  09/13/2017 -  "no   Retired. --  --/-- --  --    Tobacco Never --/-- --  never smoker         Immunizations  NameDate Admin Mfg Trade Name Lot Number Route Inj VIS Given VIS Publication   Lpfpklcuj18/09/2020 NE Not Entered  NE NE 10/09/2020    Comments: [facility]   Prevnar 1301/27/2016 [facility] PREVNAR 13 [id number] IM LD 01/27/2016 02/27/2013   Comments:    Bsyinrqe42/22/2019 NE Not Entered  NE NE 07/22/2019    Comments: [facility] 2nd dose   Nngdtpps86/13/2019 NE Not Entered  NE NE 05/13/2019    Comments: [facility] 1St dose   Xfpjsiej60/17/2013 NE Not Entered  NE NE 10/17/2013    Comments:    Td07/09/2019 [facility] DECAVAC [id number] IM LD 07/09/2019    Comments: Pt tolerated well, left office in stable condition   Tdap06/13/2012 NE Not Entered  NE NE 06/18/2012    Comments:          Review of Systems  · Constitutional  o Admits  o : weight loss  o Denies  o : fever, fatigue, weight gain  · HENT  o Denies  o : nasal congestion, nasal discharge, sore throat, ear pain, ear fullness  · Cardiovascular  o Denies  o : lower extremity edema, claudication, chest pressure, palpitations  · Respiratory  o Denies  o : shortness of breath, wheezing, cough, hemoptysis, dyspnea on exertion  · Gastrointestinal  o Denies  o : nausea, vomiting, diarrhea, constipation, abdominal pain  · Musculoskeletal  o Admits  o : joint pain, joint swelling, muscle pain, knee pain      Vitals  Date Time BP Position Site L\R Cuff Size HR RR TEMP (F) WT  HT  BMI kg/m2 BSA m2 O2 Sat FR L/min FiO2 HC       05/05/2021 10:54 /87 Sitting    68 - R 16 97.1 174lbs 5oz 5'  2\" 31.88 1.86 97 %      05/05/2021 11:15 /90 Sitting                       Physical Examination  · Constitutional  o Appearance  o : well-nourished, well developed, alert, in no acute distress  · Respiratory  o Respiratory Effort  o : breathing unlabored  o Auscultation of Lungs  o : normal breath sounds throughout  · Cardiovascular  o Heart  o :   § Auscultation of Heart  § : " regular rate and rhythm, no murmurs, gallops or rubs  § Palpation of Heart  § : normal apical impulse, no cardiac thrill present  o Peripheral Vascular System  o :   § Carotid Arteries  § : normal pulses bilaterally, no bruits present  § Pedal Pulses  § : pulses 2 bilaterally  § Extremities  § : no edema, no cyanosis, no distal hair loss, normal capillary refill  · Neurologic  o Mental Status Examination  o :   § Orientation  § : grossly oriented to person, place and time  o Cranial Nerves  o : cranial nerves intact and symmetric throughout  · Psychiatric  o Mood and Affect  o : mood normal, affect appropriate, denies any SI/HI          Results  · In-Office Procedures  o Lab procedure  § IOP - Urinalysis without Microscopy (Clinitek) [facility] ([id number])       Assessment  · Allergic rhinitis due to allergen     477.9/J30.9  · Essential hypertension     401.9/I10  · Hyperlipidemia     272.4/E78.5  · Arthritis     716.90/M19.90  · Tingling     782.0/R20.2      Plan  · Orders  o Free T4 (17929) - - 05/05/2021  o BLAISE Report (KASPR) - - 05/05/2021  o ACO-39: Current medications updated and reviewed () - - 05/05/2021  o ACO-15: Pneumococcal Vaccine Administered or Previously Received (4040F) - - 05/05/2021  o ACO-14: Influenza immunization administered or previously received () - - 05/05/2021  o ACO-20: Screening Mammography documented and reviewed (3014F) - - 05/05/2021 6/2020  o ACO-19: Colorectal cancer screening results documented and reviewed (3017F) - - 05/05/2021 2015  o ACO-13: Fall Risk Screening with no falls in past year or only one fall without injury in the past year (1101F) - - 05/05/2021  o Physical, Primary Care Panel (CBC, CMP, Lipid, TSH) [facility] ([id number], 26448, 34403, 93378, 50387) - - 05/05/2021  · Medications  o lidocaine 5 % topical cream   SIG: apply to affected area(s) by topical route 3 times a day   DISP: (30) Gram with 2 refills  Prescribed on 05/05/2021     o amlodipine 2.5 mg oral tablet    SIG: take 1 tablet by oral route 2 times a day for 90 days   DISP: (180) Tablet with 1 refills  Prescribed on 05/05/2021     o cetirizine 10 mg oral tablet   SIG: TAKE ONE TABLET BY MOUTH ONCE DAILY   DISP: (90) Tablet with 1 refills  Refilled on 05/05/2021     o propranolol 20 mg oral tablet   SIG: take 1 tablet by oral route 2 times a day for 90 days   DISP: (180) Tablet with 1 refills  Refilled on 05/05/2021     o simvastatin 10 mg oral tablet   SIG: take 1 tablet by oral route daily for 90 days qd   DISP: (90) Tablet with 1 refills  Refilled on 05/05/2021     o Singulair 10 mg oral tablet   SIG: take 1 tablet (10 mg) by oral route once daily in the evening for 90 days   DISP: (90) Tablet with 1 refills  Refilled on 05/05/2021     o Zoloft 50 mg oral tablet   SIG: take 1 tablet (50 mg) by oral route once daily   DISP: (90) Tablet with 1 refills  Refilled on 05/05/2021     o sertraline 50 mg oral tablet   SIG: take 1 tablet (50 mg) by oral route once daily   DISP: (0) Tablet with 0 refills  Discontinued on 05/05/2021     o Medications have been Reconciled  o Transition of Care or Provider Policy  · Instructions  o Advised that cheeses and other sources of dairy fats, animal fats, fast food, and the extras (candy, pasteries, pies, doughnuts and cookies) all contain LDL raising nutrients. Advised to increase fruits, vegetables, whole grains, and to monitor portion sizes.   o Take all medications as prescribed/directed.  o Patient was educated/instructed on their diagnosis, treatment and medications prior to discharge from the clinic today.  o Patient instructed to seek medical attention urgently for new or worsening symptoms.  o Call the office with any concerns or questions.  o Patient given paper scripts today.  o We will f.u in 6 months for labs and appt. Call with any concerns or questions. She will check her BP daily and keep me posted if the BP starts to stay 90/50's and then we will lower the BP med.  Discussed about the stress of life not to take off BP med at this time. I have reviewed all medications and at this time no medications changes need to be adjusted for all chronic conditions. There is no rash on the left side on the shoulder blade. We will do lidocaine TID and keep me posted.  · Disposition  o Call or Return if symptoms worsen or persist.  o Return Visit Request in/on 6 months +/- 2 days ([id number]).            Electronically Signed by: SERAFIN Gross -Author on May 5, 2021 11:44:41 AM

## 2021-06-15 ENCOUNTER — PREP FOR SURGERY (OUTPATIENT)
Dept: OTHER | Facility: HOSPITAL | Age: 76
End: 2021-06-15

## 2021-06-15 DIAGNOSIS — M17.12 PRIMARY OSTEOARTHRITIS OF LEFT KNEE: Primary | ICD-10-CM

## 2021-06-15 PROBLEM — M17.9 KNEE OSTEOARTHRITIS: Status: ACTIVE | Noted: 2021-06-15

## 2021-06-15 RX ORDER — TRANEXAMIC ACID 10 MG/ML
1000 INJECTION, SOLUTION INTRAVENOUS ONCE
Status: CANCELLED | OUTPATIENT
Start: 2021-06-15 | End: 2021-06-15

## 2021-06-15 RX ORDER — AMLODIPINE BESYLATE 2.5 MG/1
2.5 TABLET ORAL 2 TIMES DAILY
COMMUNITY
End: 2021-10-27 | Stop reason: SDUPTHER

## 2021-06-15 RX ORDER — MONTELUKAST SODIUM 10 MG/1
10 TABLET ORAL NIGHTLY
COMMUNITY
End: 2021-10-27 | Stop reason: SDUPTHER

## 2021-06-15 RX ORDER — PROPRANOLOL HYDROCHLORIDE 20 MG/1
20 TABLET ORAL 2 TIMES DAILY
COMMUNITY
End: 2021-10-27 | Stop reason: SDUPTHER

## 2021-06-15 RX ORDER — ASPIRIN 81 MG/1
81 TABLET, CHEWABLE ORAL NIGHTLY
COMMUNITY
End: 2021-07-27 | Stop reason: HOSPADM

## 2021-06-15 RX ORDER — CEFAZOLIN SODIUM IN 0.9 % NACL 3 G/100 ML
3 INTRAVENOUS SOLUTION, PIGGYBACK (ML) INTRAVENOUS ONCE
Status: CANCELLED | OUTPATIENT
Start: 2021-06-15 | End: 2021-06-15

## 2021-06-15 RX ORDER — CEFAZOLIN SODIUM 2 G/100ML
2 INJECTION, SOLUTION INTRAVENOUS ONCE
Status: CANCELLED | OUTPATIENT
Start: 2021-06-15 | End: 2021-06-15

## 2021-06-15 RX ORDER — SIMVASTATIN 10 MG
10 TABLET ORAL NIGHTLY
COMMUNITY
End: 2021-10-27 | Stop reason: SDUPTHER

## 2021-06-15 RX ORDER — CETIRIZINE HYDROCHLORIDE 10 MG/1
10 TABLET ORAL DAILY
COMMUNITY
End: 2021-12-02

## 2021-06-15 RX ORDER — LOSARTAN POTASSIUM 50 MG/1
50 TABLET ORAL NIGHTLY
COMMUNITY
End: 2021-10-27 | Stop reason: SDUPTHER

## 2021-06-15 NOTE — PRE-PROCEDURE INSTRUCTIONS
INSTRUCTED ON LAXATIVE PREP-PRE OP MEDS-CLEAR LIQUID DIET-SMALL SIP OF WATER WITH MEDS    MEDS TO TAKE    AMLODIPINE  ZYRTEC  PROPRANOLOL  SERTRALINE

## 2021-06-16 ENCOUNTER — ANESTHESIA EVENT (OUTPATIENT)
Dept: GASTROENTEROLOGY | Facility: HOSPITAL | Age: 76
End: 2021-06-16

## 2021-06-16 ENCOUNTER — ANESTHESIA (OUTPATIENT)
Dept: GASTROENTEROLOGY | Facility: HOSPITAL | Age: 76
End: 2021-06-16

## 2021-06-16 ENCOUNTER — HOSPITAL ENCOUNTER (OUTPATIENT)
Facility: HOSPITAL | Age: 76
Setting detail: HOSPITAL OUTPATIENT SURGERY
Discharge: HOME | End: 2021-06-16
Attending: SURGERY | Admitting: SURGERY

## 2021-06-16 VITALS
WEIGHT: 169.75 LBS | OXYGEN SATURATION: 93 % | BODY MASS INDEX: 31.05 KG/M2 | DIASTOLIC BLOOD PRESSURE: 72 MMHG | TEMPERATURE: 97.8 F | RESPIRATION RATE: 14 BRPM | HEART RATE: 66 BPM | SYSTOLIC BLOOD PRESSURE: 102 MMHG

## 2021-06-16 DIAGNOSIS — Z86.010 HISTORY OF COLON POLYPS: ICD-10-CM

## 2021-06-16 PROCEDURE — 88305 TISSUE EXAM BY PATHOLOGIST: CPT | Performed by: SURGERY

## 2021-06-16 PROCEDURE — 25010000002 PROPOFOL 10 MG/ML EMULSION: Performed by: NURSE ANESTHETIST, CERTIFIED REGISTERED

## 2021-06-16 RX ORDER — SODIUM CHLORIDE, SODIUM LACTATE, POTASSIUM CHLORIDE, CALCIUM CHLORIDE 600; 310; 30; 20 MG/100ML; MG/100ML; MG/100ML; MG/100ML
9 INJECTION, SOLUTION INTRAVENOUS CONTINUOUS
Status: DISCONTINUED | OUTPATIENT
Start: 2021-06-16 | End: 2021-06-16 | Stop reason: HOSPADM

## 2021-06-16 RX ORDER — LIDOCAINE HYDROCHLORIDE 20 MG/ML
INJECTION, SOLUTION INFILTRATION; PERINEURAL AS NEEDED
Status: DISCONTINUED | OUTPATIENT
Start: 2021-06-16 | End: 2021-06-16 | Stop reason: SURG

## 2021-06-16 RX ORDER — SODIUM CHLORIDE 0.9 % (FLUSH) 0.9 %
10 SYRINGE (ML) INJECTION AS NEEDED
Status: DISCONTINUED | OUTPATIENT
Start: 2021-06-16 | End: 2021-06-16 | Stop reason: HOSPADM

## 2021-06-16 RX ORDER — SODIUM CHLORIDE 0.9 % (FLUSH) 0.9 %
3 SYRINGE (ML) INJECTION EVERY 12 HOURS SCHEDULED
Status: DISCONTINUED | OUTPATIENT
Start: 2021-06-16 | End: 2021-06-16 | Stop reason: HOSPADM

## 2021-06-16 RX ADMIN — LIDOCAINE HYDROCHLORIDE 40 MG: 20 INJECTION, SOLUTION INFILTRATION; PERINEURAL at 09:31

## 2021-06-16 RX ADMIN — SODIUM CHLORIDE, POTASSIUM CHLORIDE, SODIUM LACTATE AND CALCIUM CHLORIDE 9 ML/HR: 600; 310; 30; 20 INJECTION, SOLUTION INTRAVENOUS at 08:11

## 2021-06-16 RX ADMIN — PROPOFOL 200 MCG/KG/MIN: 10 INJECTION, EMULSION INTRAVENOUS at 09:31

## 2021-06-16 NOTE — ANESTHESIA PREPROCEDURE EVALUATION
Anesthesia Evaluation     Patient summary reviewed and Nursing notes reviewed   no history of anesthetic complications:  NPO Solid Status: > 8 hours  NPO Liquid Status: > 2 hours           Airway   Mallampati: I  TM distance: >3 FB  Neck ROM: full  No difficulty expected  Dental      Pulmonary - negative pulmonary ROS and normal exam    breath sounds clear to auscultation  Cardiovascular - normal exam  Exercise tolerance: good (4-7 METS)    Rhythm: regular    (+) hypertension, hyperlipidemia,       Neuro/Psych  (+) CVA, numbness,     GI/Hepatic/Renal/Endo - negative ROS     Musculoskeletal     Abdominal    Substance History - negative use     OB/GYN negative ob/gyn ROS         Other   arthritis,                      Anesthesia Plan    ASA 3     general   total IV anesthesia  intravenous induction     Anesthetic plan, all risks, benefits, and alternatives have been provided, discussed and informed consent has been obtained with: patient.

## 2021-06-16 NOTE — ANESTHESIA POSTPROCEDURE EVALUATION
Patient: Yeimy Yang    Procedure Summary     Date: 06/16/21 Room / Location: [facility] ENDOSCOPY 1 / [facility] END[facility]COPY    Anesthesia Start: 0931 Anesthesia Stop: 1001    Procedure: COLONOSCOPY with cold snare (N/A ) Diagnosis: (screening with HX of colon polyps)    Surgeons: Rosendo Dawn MD Provider: Bharath Rucker MD    Anesthesia Type: general ASA Status: 3          Anesthesia Type: general    Vitals  Vitals Value Taken Time   /72 06/16/21 1014   Temp 36.6 °C (97.8 °F) 06/16/21 1014   Pulse 66 06/16/21 1014   Resp 14 06/16/21 1014   SpO2 93 % 06/16/21 1014           Post Anesthesia Care and Evaluation    Patient location during evaluation: bedside  Patient participation: complete - patient participated  Level of consciousness: awake  Pain score: 0  Pain management: adequate  Airway patency: patent  Anesthetic complications: No anesthetic complications  PONV Status: none  Cardiovascular status: acceptable and stable  Respiratory status: acceptable and room air  Hydration status: acceptable

## 2021-06-16 NOTE — DISCHARGE INSTRUCTIONS
Hemorrhoids  Hemorrhoids are swollen veins in and around the rectum or anus. There are two types of hemorrhoids:  · Internal hemorrhoids. These occur in the veins that are just inside the rectum. They may poke through to the outside and become irritated and painful.  · External hemorrhoids. These occur in the veins that are outside the anus and can be felt as a painful swelling or hard lump near the anus.  Most hemorrhoids do not cause serious problems, and they can be managed with home treatments such as diet and lifestyle changes. If home treatments do not help the symptoms, procedures can be done to shrink or remove the hemorrhoids.  What are the causes?  This condition is caused by increased pressure in the anal area. This pressure may result from various things, including:  · Constipation.  · Straining to have a bowel movement.  · Diarrhea.  · Pregnancy.  · Obesity.  · Sitting for long periods of time.  · Heavy lifting or other activity that causes you to strain.  · Anal sex.  · Riding a bike for a long period of time.  What are the signs or symptoms?  Symptoms of this condition include:  · Pain.  · Anal itching or irritation.  · Rectal bleeding.  · Leakage of stool (feces).  · Anal swelling.  · One or more lumps around the anus.  How is this diagnosed?  This condition can often be diagnosed through a visual exam. Other exams or tests may also be done, such as:  · An exam that involves feeling the rectal area with a gloved hand (digital rectal exam).  · An exam of the anal canal that is done using a small tube (anoscope).  · A blood test, if you have lost a significant amount of blood.  · A test to look inside the colon using a flexible tube with a camera on the end (sigmoidoscopy or colonoscopy).  How is this treated?  This condition can usually be treated at home. However, various procedures may be done if dietary changes, lifestyle changes, and other home treatments do not help your symptoms. These  procedures can help make the hemorrhoids smaller or remove them completely. Some of these procedures involve surgery, and others do not. Common procedures include:  · Rubber band ligation. Rubber bands are placed at the base of the hemorrhoids to cut off their blood supply.  · Sclerotherapy. Medicine is injected into the hemorrhoids to shrink them.  · Infrared coagulation. A type of light energy is used to get rid of the hemorrhoids.  · Hemorrhoidectomy surgery. The hemorrhoids are surgically removed, and the veins that supply them are tied off.  · Stapled hemorrhoidopexy surgery. The surgeon staples the base of the hemorrhoid to the rectal wall.  Follow these instructions at home:  Eating and drinking    · Eat foods that have a lot of fiber in them, such as whole grains, beans, nuts, fruits, and vegetables.  · Ask your health care provider about taking products that have added fiber (fiber supplements).  · Reduce the amount of fat in your diet. You can do this by eating low-fat dairy products, eating less red meat, and avoiding processed foods.  · Drink enough fluid to keep your urine pale yellow.  Managing pain and swelling    · Take warm sitz baths for 20 minutes, 3-4 times a day to ease pain and discomfort. You may do this in a bathtub or using a portable sitz bath that fits over the toilet.  · If directed, apply ice to the affected area. Using ice packs between sitz baths may be helpful.  ? Put ice in a plastic bag.  ? Place a towel between your skin and the bag.  ? Leave the ice on for 20 minutes, 2-3 times a day.  General instructions  · Take over-the-counter and prescription medicines only as told by your health care provider.  · Use medicated creams or suppositories as told.  · Get regular exercise. Ask your health care provider how much and what kind of exercise is best for you. In general, you should do moderate exercise for at least 30 minutes on most days of the week (150 minutes each week). This can  include activities such as walking, biking, or yoga.  · Go to the bathroom when you have the urge to have a bowel movement. Do not wait.  · Avoid straining to have bowel movements.  · Keep the anal area dry and clean. Use wet toilet paper or moist towelettes after a bowel movement.  · Do not sit on the toilet for long periods of time. This increases blood pooling and pain.  · Keep all follow-up visits as told by your health care provider. This is important.  Contact a health care provider if you have:  · Increasing pain and swelling that are not controlled by treatment or medicine.  · Difficulty having a bowel movement, or you are unable to have a bowel movement.  · Pain or inflammation outside the area of the hemorrhoids.  Get help right away if you have:  · Uncontrolled bleeding from your rectum.  Summary  · Hemorrhoids are swollen veins in and around the rectum or anus.  · Most hemorrhoids can be managed with home treatments such as diet and lifestyle changes.  · Taking warm sitz baths can help ease pain and discomfort.  · In severe cases, procedures or surgery can be done to shrink or remove the hemorrhoids.  This information is not intended to replace advice given to you by your health care provider. Make sure you discuss any questions you have with your health care provider.  Document Revised: 05/15/2020 Document Reviewed: 05/09/2019  [employer] Patient Education © 2021 [employer]  Colon Polyps    Polyps are tissue growths inside the body. Polyps can grow in many places, including the large intestine (colon). A polyp may be a round bump or a mushroom-shaped growth. You could have one polyp or several.  Most colon polyps are noncancerous (benign). However, some colon polyps can become cancerous over time. Finding and removing the polyps early can help prevent this.  What are the causes?  The exact cause of colon polyps is not known.  What increases the risk?  You are more likely to develop this condition if  you:  · Have a family history of colon cancer or colon polyps.  · Are older than 50 or older than 45 if you are .  · Have inflammatory bowel disease, such as ulcerative colitis or Crohn's disease.  · Have certain hereditary conditions, such as:  ? Familial adenomatous polyposis.  ? Cohen syndrome.  ? Turcot syndrome.  ? Peutz-Jeghers syndrome.  · Are overweight.  · Smoke cigarettes.  · Do not get enough exercise.  · Drink too much alcohol.  · Eat a diet that is high in fat and red meat and low in fiber.  · Had childhood cancer that was treated with abdominal radiation.  What are the signs or symptoms?  Most polyps do not cause symptoms.  If you have symptoms, they may include:  · Blood coming from your rectum when having a bowel movement.  · Blood in your stool. The stool may look dark red or black.  · Abdominal pain.  · A change in bowel habits, such as constipation or diarrhea.  How is this diagnosed?  This condition is diagnosed with a colonoscopy. This is a procedure in which a lighted, flexible scope is inserted into the anus and then passed into the colon to examine the area. Polyps are sometimes found when a colonoscopy is done as part of routine cancer screening tests.  How is this treated?  Treatment for this condition involves removing any polyps that are found. Most polyps can be removed during a colonoscopy. Those polyps will then be tested for cancer. Additional treatment may be needed depending on the results of testing.  Follow these instructions at home:  Lifestyle  · Maintain a healthy weight, or lose weight if recommended by your health care provider.  · Exercise every day or as told by your health care provider.  · Do not use any products that contain nicotine or tobacco, such as cigarettes and e-cigarettes. If you need help quitting, ask your health care provider.  · If you drink alcohol, limit how much you have:  ? 0-1 drink a day for women.  ? 0-2 drinks a day for men.  · Be  aware of how much alcohol is in your drink. In the [place] one drink equals one 12 oz bottle of beer (355 mL), one 5 oz glass of wine (148 mL), or one 1½ oz shot of hard liquor (44 mL).  Eating and drinking    · Eat foods that are high in fiber, such as fruits, vegetables, and whole grains.  · Eat foods that are high in calcium and vitamin D, such as milk, cheese, yogurt, eggs, liver, fish, and broccoli.  · Limit foods that are high in fat, such as fried foods and desserts.  · Limit the amount of red meat and processed meat you eat, such as hot dogs, sausage, bennett, and lunch meats.  General instructions  · Keep all follow-up visits as told by your health care provider. This is important.  ? This includes having regularly scheduled colonoscopies.  ? Talk to your health care provider about when you need a colonoscopy.  Contact a health care provider if:  · You have new or worsening bleeding during a bowel movement.  · You have new or increased blood in your stool.  · You have a change in bowel habits.  · You lose weight for no known reason.  Summary  · Polyps are tissue growths inside the body. Polyps can grow in many places, including the colon.  · Most colon polyps are noncancerous (benign), but some can become cancerous over time.  · This condition is diagnosed with a colonoscopy.  · Treatment for this condition involves removing any polyps that are found. Most polyps can be removed during a colonoscopy.  This information is not intended to replace advice given to you by your health care provider. Make sure you discuss any questions you have with your health care provider.  Document Revised: 04/04/2019 Document Reviewed: 04/04/2019  [employer] Patient Education © 2021 [employer]    Dr. Dawn's Instructions       • Next colonoscopy in 5 years.

## 2021-06-17 LAB
CYTO UR: NORMAL
LAB AP CASE REPORT: NORMAL
LAB AP CLINICAL INFORMATION: NORMAL
PATH REPORT.FINAL DX SPEC: NORMAL
PATH REPORT.GROSS SPEC: NORMAL

## 2021-07-13 ENCOUNTER — PRE-ADMISSION TESTING (OUTPATIENT)
Dept: PREADMISSION TESTING | Facility: HOSPITAL | Age: 76
End: 2021-07-13

## 2021-07-13 VITALS
TEMPERATURE: 97.2 F | RESPIRATION RATE: 16 BRPM | BODY MASS INDEX: 32.55 KG/M2 | OXYGEN SATURATION: 95 % | WEIGHT: 172.4 LBS | HEART RATE: 67 BPM | HEIGHT: 61 IN

## 2021-07-13 DIAGNOSIS — G89.29 CHRONIC PAIN OF RIGHT KNEE: Primary | ICD-10-CM

## 2021-07-13 DIAGNOSIS — Z96.652 AFTERCARE FOLLOWING LEFT KNEE JOINT REPLACEMENT SURGERY: Primary | ICD-10-CM

## 2021-07-13 DIAGNOSIS — Z47.1 AFTERCARE FOLLOWING LEFT KNEE JOINT REPLACEMENT SURGERY: Primary | ICD-10-CM

## 2021-07-13 DIAGNOSIS — M25.561 CHRONIC PAIN OF RIGHT KNEE: Primary | ICD-10-CM

## 2021-07-13 LAB
ALBUMIN SERPL-MCNC: 4.5 G/DL (ref 3.5–5.2)
ALBUMIN/GLOB SERPL: 1.7 G/DL
ALP SERPL-CCNC: 100 U/L (ref 39–117)
ALT SERPL W P-5'-P-CCNC: 15 U/L (ref 1–33)
ANION GAP SERPL CALCULATED.3IONS-SCNC: 10.6 MMOL/L (ref 5–15)
AST SERPL-CCNC: 15 U/L (ref 1–32)
BASOPHILS # BLD AUTO: 0.05 10*3/MM3 (ref 0–0.2)
BASOPHILS NFR BLD AUTO: 0.4 % (ref 0–1.5)
BILIRUB SERPL-MCNC: 0.3 MG/DL (ref 0–1.2)
BUN SERPL-MCNC: 16 MG/DL (ref 8–23)
BUN/CREAT SERPL: 22.5 (ref 7–25)
CALCIUM SPEC-SCNC: 9.6 MG/DL (ref 8.6–10.5)
CHLORIDE SERPL-SCNC: 105 MMOL/L (ref 98–107)
CO2 SERPL-SCNC: 24.4 MMOL/L (ref 22–29)
CREAT SERPL-MCNC: 0.71 MG/DL (ref 0.57–1)
DEPRECATED RDW RBC AUTO: 42 FL (ref 37–54)
EOSINOPHIL # BLD AUTO: 0.18 10*3/MM3 (ref 0–0.4)
EOSINOPHIL NFR BLD AUTO: 1.6 % (ref 0.3–6.2)
ERYTHROCYTE [DISTWIDTH] IN BLOOD BY AUTOMATED COUNT: 13.2 % (ref 12.3–15.4)
GFR SERPL CREATININE-BSD FRML MDRD: 80 ML/MIN/1.73
GLOBULIN UR ELPH-MCNC: 2.7 GM/DL
GLUCOSE SERPL-MCNC: 96 MG/DL (ref 65–99)
HBA1C MFR BLD: 5.31 % (ref 4.8–5.6)
HCT VFR BLD AUTO: 40.5 % (ref 34–46.6)
HGB BLD-MCNC: 13.7 G/DL (ref 12–15.9)
IMM GRANULOCYTES # BLD AUTO: 0.06 10*3/MM3 (ref 0–0.05)
IMM GRANULOCYTES NFR BLD AUTO: 0.5 % (ref 0–0.5)
INR PPP: 0.93 (ref 2–3)
LYMPHOCYTES # BLD AUTO: 2.61 10*3/MM3 (ref 0.7–3.1)
LYMPHOCYTES NFR BLD AUTO: 23 % (ref 19.6–45.3)
MCH RBC QN AUTO: 29.6 PG (ref 26.6–33)
MCHC RBC AUTO-ENTMCNC: 33.8 G/DL (ref 31.5–35.7)
MCV RBC AUTO: 87.5 FL (ref 79–97)
MONOCYTES # BLD AUTO: 0.86 10*3/MM3 (ref 0.1–0.9)
MONOCYTES NFR BLD AUTO: 7.6 % (ref 5–12)
NEUTROPHILS NFR BLD AUTO: 66.9 % (ref 42.7–76)
NEUTROPHILS NFR BLD AUTO: 7.6 10*3/MM3 (ref 1.7–7)
NRBC BLD AUTO-RTO: 0 /100 WBC (ref 0–0.2)
PLATELET # BLD AUTO: 258 10*3/MM3 (ref 140–450)
PMV BLD AUTO: 10.1 FL (ref 6–12)
POTASSIUM SERPL-SCNC: 4.3 MMOL/L (ref 3.5–5.2)
PROT SERPL-MCNC: 7.2 G/DL (ref 6–8.5)
PROTHROMBIN TIME: 10.3 SECONDS (ref 9.4–12)
QT INTERVAL: 417 MS
RBC # BLD AUTO: 4.63 10*6/MM3 (ref 3.77–5.28)
SODIUM SERPL-SCNC: 140 MMOL/L (ref 136–145)
WBC # BLD AUTO: 11.36 10*3/MM3 (ref 3.4–10.8)

## 2021-07-13 PROCEDURE — 93005 ELECTROCARDIOGRAM TRACING: CPT

## 2021-07-13 PROCEDURE — 93010 ELECTROCARDIOGRAM REPORT: CPT | Performed by: INTERNAL MEDICINE

## 2021-07-13 PROCEDURE — 85610 PROTHROMBIN TIME: CPT | Performed by: ANESTHESIOLOGY

## 2021-07-13 PROCEDURE — 85025 COMPLETE CBC W/AUTO DIFF WBC: CPT

## 2021-07-13 PROCEDURE — 83036 HEMOGLOBIN GLYCOSYLATED A1C: CPT | Performed by: ANESTHESIOLOGY

## 2021-07-13 PROCEDURE — 80053 COMPREHEN METABOLIC PANEL: CPT

## 2021-07-13 PROCEDURE — 36415 COLL VENOUS BLD VENIPUNCTURE: CPT

## 2021-07-13 RX ORDER — B-COMPLEX WITH VITAMIN C
TABLET ORAL
COMMUNITY

## 2021-07-13 RX ORDER — CHLORAL HYDRATE 500 MG
CAPSULE ORAL
COMMUNITY
End: 2021-07-27 | Stop reason: HOSPADM

## 2021-07-13 RX ORDER — MELATONIN
1000 DAILY
COMMUNITY
End: 2021-10-27 | Stop reason: SDUPTHER

## 2021-07-13 RX ORDER — MULTIPLE VITAMINS W/ MINERALS TAB 9MG-400MCG
1 TAB ORAL DAILY
COMMUNITY

## 2021-07-13 RX ORDER — ASCORBIC ACID 500 MG
500 TABLET ORAL DAILY
COMMUNITY

## 2021-07-13 ASSESSMENT — KOOS JR
KOOS JR SCORE: 50.012
KOOS JR SCORE: 15

## 2021-07-13 NOTE — DISCHARGE INSTRUCTIONS
IMPORTANT INSTRUCTIONS - PRE-ADMISSION TESTING  1. DO NOT EAT OR CHEW anything after midnight the night before your procedure.    2. You may have CLEAR liquids up to __3____ hours prior to ARRIVAL time (INCLUDES GATORADE).   3. Take the following medications the morning of your procedure with JUST A SIP OF WATER:  _______CETIRIZINE, AMLODIPINE, PROPRANOLOL, SERTRALINE________________________________________________________________________________________________________________________________________________________________________________    4. DO NOT BRING your medications to the hospital with you, UNLESS something has changed since your PRE-Admission Testing appointment.  5. Hold all vitamins, supplements, and NSAIDS (Non- steroidal anti-inflammatory meds)ASPIRIN LAST DOSE 7/20/21 LAST DOSE OF VITAMINS 7/18/21 for one week prior to surgery (you MAY take Tylenol or Acetaminophen).  6. If you are diabetic, check your blood sugar the morning of your procedure. If it is less than 70 or if you are feeling symptomatic, call the following number for further instructions: [phone] SAME DAY SURGERY WILL CALL YOUR ARRIVAL TIME_______.  7. Use your inhalers/nebulizers as usual, the morning of your procedure. BRING YOUR INHALERS with you. NA  8. Bring your CPAP or BIPAP to hospital, ONLY IF YOU WILL BE SPENDING THE NIGHT. NA  9. Make sure you have a ride home and have someone who will stay with you the day of your procedure after you go home.  10. If you have any questions, please call your Pre-Admission Testing Nurse, __SELINA______________ at [phone]___________.   11. Per anesthesia request, do not smoke for 24 hours before your procedure or as instructed by your surgeon.  NA

## 2021-07-13 NOTE — SIGNIFICANT NOTE
PT NEEDS DME CONSULT FOR HOME EQUIPMENT, PT WILL USE [facility] FOR OUT PT  P.T. AND HAS TRANSPORTATION

## 2021-07-15 ENCOUNTER — ANESTHESIA EVENT (OUTPATIENT)
Dept: PERIOP | Facility: HOSPITAL | Age: 76
End: 2021-07-15

## 2021-07-15 VITALS
OXYGEN SATURATION: 97 % | HEIGHT: 62 IN | RESPIRATION RATE: 16 BRPM | HEART RATE: 68 BPM | WEIGHT: 174.31 LBS | BODY MASS INDEX: 32.08 KG/M2 | TEMPERATURE: 97.1 F | DIASTOLIC BLOOD PRESSURE: 87 MMHG | SYSTOLIC BLOOD PRESSURE: 147 MMHG

## 2021-07-15 VITALS — DIASTOLIC BLOOD PRESSURE: 90 MMHG | SYSTOLIC BLOOD PRESSURE: 150 MMHG

## 2021-07-15 VITALS — WEIGHT: 175.12 LBS | HEIGHT: 62 IN | BODY MASS INDEX: 32.22 KG/M2 | RESPIRATION RATE: 14 BRPM

## 2021-07-19 ENCOUNTER — OFFICE VISIT (OUTPATIENT)
Dept: FAMILY MEDICINE CLINIC | Facility: CLINIC | Age: 76
End: 2021-07-19

## 2021-07-19 VITALS
OXYGEN SATURATION: 97 % | DIASTOLIC BLOOD PRESSURE: 68 MMHG | TEMPERATURE: 97.3 F | RESPIRATION RATE: 12 BRPM | SYSTOLIC BLOOD PRESSURE: 118 MMHG | BODY MASS INDEX: 32.34 KG/M2 | WEIGHT: 171.3 LBS | HEIGHT: 61 IN | HEART RATE: 73 BPM

## 2021-07-19 DIAGNOSIS — M25.50 ARTHRALGIA, UNSPECIFIED JOINT: ICD-10-CM

## 2021-07-19 DIAGNOSIS — M17.10 PRIMARY OSTEOARTHRITIS OF KNEE, UNSPECIFIED LATERALITY: Primary | ICD-10-CM

## 2021-07-19 PROBLEM — E78.5 HYPERLIPIDEMIA: Status: ACTIVE | Noted: 2021-07-19

## 2021-07-19 PROBLEM — R53.83 FATIGUE: Status: ACTIVE | Noted: 2020-11-02

## 2021-07-19 PROBLEM — G56.00 CARPAL TUNNEL SYNDROME: Status: ACTIVE | Noted: 2021-07-19

## 2021-07-19 PROBLEM — M17.9 OSTEOARTHRITIS OF KNEE: Status: ACTIVE | Noted: 2017-08-01

## 2021-07-19 PROBLEM — M19.90 ARTHRITIS: Status: ACTIVE | Noted: 2021-05-05

## 2021-07-19 PROBLEM — J30.9 ALLERGIC RHINITIS DUE TO ALLERGEN: Status: ACTIVE | Noted: 2018-08-09

## 2021-07-19 PROBLEM — I10 ESSENTIAL HYPERTENSION: Status: ACTIVE | Noted: 2018-08-09

## 2021-07-19 PROBLEM — M19.90 ARTHRITIS: Chronic | Status: ACTIVE | Noted: 2021-05-05

## 2021-07-19 PROCEDURE — 99213 OFFICE O/P EST LOW 20 MIN: CPT | Performed by: NURSE PRACTITIONER

## 2021-07-19 NOTE — PROGRESS NOTES
Chief Complaint  Pain (arthritis)    Subjective          Yeimy Yang presents to [facility]  History of Present Illness    She is requesting a rx of diclofenac.  She is wanting to try it for the joints.  She is having more issues with the back and knees.  She does have some arthritis in the body.  She is having issues even with the thumbs.  She doesn't know where it will be on different days.  She is having the knee surg on Monday.    Past Medical History:   • Allergic rhinitis due to allergen   • Arthritis   • Essential hypertension   • Fatigue   • Hyperlipemia   • Plantar fasciitis of right foot   • Primary osteoarthritis of left knee   • Seasonal allergies   • Shoulder pain, right   • Stroke (CMS/HCC)    RIGHT HAND WEAK occassional, BEFORE 2010   • Tear of medial meniscus of left knee, current, initial encounter   • Trochanteric bursitis       Allergies  Patient has no known allergies.    Past Surgical History:   • CATARACT EXTRACTION, BILATERAL   • COLONOSCOPY   • COLONOSCOPY    Procedure: COLONOSCOPY with cold snare;  Surgeon: Rosendo Dawn MD;  Location: [facility];  Service: General;  Laterality: N/A;  colon polyp  internal hemorrhoids   • HYSTERECTOMY   • KNEE ARTHROSCOPY   • THYROIDECTOMY    NODULE REMOVED ? SIDE       Social History     Tobacco Use   • Smoking status: Never Smoker   • Smokeless tobacco: Never Used   Vaping Use   • Vaping Use: Never used   Substance Use Topics   • Alcohol use: Never   • Drug use: Never       Family History   Problem Relation Age of Onset   • Heart disease Mother    • Arthritis Mother    • Heart disease Father    • Cancer Brother    • Lung cancer Other    • Malig Hyperthermia Neg Hx         Health Maintenance Due   Topic Date Due   • Pneumococcal Vaccine 65+ (2 of 2 - PPSV23) 01/27/2017   • ZOSTER VACCINE (3 of 3) 09/16/2019   • HEPATITIS C SCREENING  Never done   • DXA SCAN  07/18/2021          Current Outpatient  Medications:   •  amLODIPine (NORVASC) 2.5 MG tablet, Take 2.5 mg by mouth 2 (two) times a day., Disp: , Rfl:   •  ascorbic acid (VITAMIN C) 500 MG tablet, Take 500 mg by mouth Daily., Disp: , Rfl:   •  aspirin 81 MG chewable tablet, Chew 81 mg Every Night. Instructed to hold  Per dorene, last dose to be  7/20//21, takes for preventative health, hx cva  Before 2010, Disp: , Rfl:   •  cetirizine (zyrTEC) 10 MG tablet, Take 10 mg by mouth Daily., Disp: , Rfl:   •  cholecalciferol (VITAMIN D3) 25 MCG (1000 UT) tablet, Take 1,000 Units by mouth Daily., Disp: , Rfl:   •  Garlic 1000 MG capsule, Take  by mouth., Disp: , Rfl:   •  losartan (COZAAR) 50 MG tablet, Take 50 mg by mouth Every Night., Disp: , Rfl:   •  montelukast (SINGULAIR) 10 MG tablet, Take 10 mg by mouth Every Night., Disp: , Rfl:   •  multivitamin with minerals tablet tablet, Take 1 tablet by mouth Daily., Disp: , Rfl:   •  Omega-3 Fatty Acids (fish oil) 1000 MG capsule capsule, Take  by mouth Daily With Breakfast., Disp: , Rfl:   •  propranolol (INDERAL) 20 MG tablet, Take 20 mg by mouth 2 (Two) Times a Day., Disp: , Rfl:   •  sertraline (ZOLOFT) 50 MG tablet, Take 50 mg by mouth Every Morning., Disp: , Rfl:   •  simvastatin (ZOCOR) 10 MG tablet, Take 10 mg by mouth Every Night., Disp: , Rfl:   •  Vitamin B Complex-C capsule, Take  by mouth., Disp: , Rfl:   •  Zinc 50 MG capsule, Take  by mouth., Disp: , Rfl:   •  diclofenac (VOLTAREN) 50 MG EC tablet, Take 1 tablet by mouth 2 (Two) Times a Day., Disp: 60 tablet, Rfl: 2    There are no discontinued medications.    Immunization History   Administered Date(s) Administered   • Influenza, Unspecified 10/09/2020   • Pneumococcal Conjugate 13-Valent (PCV13) 01/27/2016   • TD Preservative Free 07/09/2019   • Tdap 06/13/2012   • Zostavax 10/17/2013   • Zoster, Unspecified 05/13/2019, 07/22/2019       Review of Systems   Constitutional: Negative for fatigue.   Musculoskeletal: Positive for arthralgias, back pain  and myalgias. Negative for joint swelling.        Objective       Vitals:    07/19/21 1425   BP: 118/68   Pulse: 73   Resp: 12   Temp: 97.3 °F (36.3 °C)   SpO2: 97%     Body mass index is 32.37 kg/m².         Physical Exam  Vitals reviewed.   Constitutional:       Appearance: Normal appearance. She is well-developed.   HENT:      Mouth/Throat:      Pharynx: No oropharyngeal exudate.   Cardiovascular:      Rate and Rhythm: Normal rate and regular rhythm.      Heart sounds: Normal heart sounds. No murmur heard.     Pulmonary:      Effort: Pulmonary effort is normal.      Breath sounds: Normal breath sounds.   Neurological:      Mental Status: She is alert and oriented to person, place, and time.      Cranial Nerves: No cranial nerve deficit.      Motor: No weakness.   Psychiatric:         Mood and Affect: Mood and affect normal.             Result Review :     The following data was reviewed by: SERAFIN Gross on 07/19/2021:                     Assessment and Plan      Diagnoses and all orders for this visit:    1. Primary osteoarthritis of knee, unspecified laterality (Primary)  -     diclofenac (VOLTAREN) 50 MG EC tablet; Take 1 tablet by mouth 2 (Two) Times a Day.  Dispense: 60 tablet; Refill: 2    2. Arthralgia, unspecified joint  -     diclofenac (VOLTAREN) 50 MG EC tablet; Take 1 tablet by mouth 2 (Two) Times a Day.  Dispense: 60 tablet; Refill: 2            Follow Up     No follow-ups on file.    Patient was given instructions and counseling regarding her condition or for health maintenance advice. Please see specific information pulled into the AVS if appropriate.   We will do the diclofenac for the joint/arthritis.  Do not start until after you talk with ortho since she is having surg on Monday for the knee (replacement).  She is aware to take the med with food.     SERAFIN Gross

## 2021-07-26 ENCOUNTER — HOSPITAL ENCOUNTER (OUTPATIENT)
Facility: HOSPITAL | Age: 76
Discharge: HOME | End: 2021-07-27
Attending: ORTHOPAEDIC SURGERY | Admitting: ORTHOPAEDIC SURGERY

## 2021-07-26 ENCOUNTER — APPOINTMENT (OUTPATIENT)
Dept: GENERAL RADIOLOGY | Facility: HOSPITAL | Age: 76
End: 2021-07-26

## 2021-07-26 ENCOUNTER — ANESTHESIA (OUTPATIENT)
Dept: PERIOP | Facility: HOSPITAL | Age: 76
End: 2021-07-26

## 2021-07-26 DIAGNOSIS — R26.2 DIFFICULTY IN WALKING: Primary | ICD-10-CM

## 2021-07-26 DIAGNOSIS — Z78.9 DECREASED ACTIVITIES OF DAILY LIVING (ADL): ICD-10-CM

## 2021-07-26 DIAGNOSIS — M17.12 PRIMARY OSTEOARTHRITIS OF LEFT KNEE: ICD-10-CM

## 2021-07-26 PROCEDURE — 25010000002 ROPIVACAINE PER 1 MG: Performed by: ANESTHESIOLOGY

## 2021-07-26 PROCEDURE — A9270 NON-COVERED ITEM OR SERVICE: HCPCS | Performed by: ANESTHESIOLOGY

## 2021-07-26 PROCEDURE — 63710000001 GABAPENTIN 300 MG CAPSULE: Performed by: ANESTHESIOLOGY

## 2021-07-26 PROCEDURE — 25010000002 PROPOFOL 10 MG/ML EMULSION: Performed by: NURSE ANESTHETIST, CERTIFIED REGISTERED

## 2021-07-26 PROCEDURE — 25010000002 KETOROLAC TROMETHAMINE PER 15 MG: Performed by: ORTHOPAEDIC SURGERY

## 2021-07-26 PROCEDURE — 94799 UNLISTED PULMONARY SVC/PX: CPT

## 2021-07-26 PROCEDURE — 25010000003 CEFAZOLIN IN DEXTROSE 2-4 GM/100ML-% SOLUTION: Performed by: ORTHOPAEDIC SURGERY

## 2021-07-26 PROCEDURE — 27447 TOTAL KNEE ARTHROPLASTY: CPT | Performed by: ORTHOPAEDIC SURGERY

## 2021-07-26 PROCEDURE — 63710000001 CELECOXIB 100 MG CAPSULE: Performed by: ANESTHESIOLOGY

## 2021-07-26 PROCEDURE — C1713 ANCHOR/SCREW BN/BN,TIS/BN: HCPCS | Performed by: ORTHOPAEDIC SURGERY

## 2021-07-26 PROCEDURE — 99213 OFFICE O/P EST LOW 20 MIN: CPT | Performed by: HOSPITALIST

## 2021-07-26 PROCEDURE — 97161 PT EVAL LOW COMPLEX 20 MIN: CPT

## 2021-07-26 PROCEDURE — A9270 NON-COVERED ITEM OR SERVICE: HCPCS | Performed by: ORTHOPAEDIC SURGERY

## 2021-07-26 PROCEDURE — 73560 X-RAY EXAM OF KNEE 1 OR 2: CPT

## 2021-07-26 PROCEDURE — 63710000001 ACETAMINOPHEN 500 MG TABLET: Performed by: ANESTHESIOLOGY

## 2021-07-26 PROCEDURE — 25010000002 ROPIVACAINE PER 1 MG: Performed by: ORTHOPAEDIC SURGERY

## 2021-07-26 PROCEDURE — C1776 JOINT DEVICE (IMPLANTABLE): HCPCS | Performed by: ORTHOPAEDIC SURGERY

## 2021-07-26 PROCEDURE — 25010000002 MIDAZOLAM PER 1MG: Performed by: ANESTHESIOLOGY

## 2021-07-26 PROCEDURE — 25010000002 MORPHINE (PF) 10 MG/ML SOLUTION: Performed by: ORTHOPAEDIC SURGERY

## 2021-07-26 PROCEDURE — 25010000002 EPINEPHRINE 1 MG/ML SOLUTION: Performed by: ORTHOPAEDIC SURGERY

## 2021-07-26 PROCEDURE — 63710000001 SENNOSIDES-DOCUSATE 8.6-50 MG TABLET: Performed by: ORTHOPAEDIC SURGERY

## 2021-07-26 PROCEDURE — 25010000002 FENTANYL CITRATE (PF) 50 MCG/ML SOLUTION: Performed by: NURSE ANESTHETIST, CERTIFIED REGISTERED

## 2021-07-26 DEVICE — TRY TIB CRUC 67MM: Type: IMPLANTABLE DEVICE | Site: KNEE | Status: FUNCTIONAL

## 2021-07-26 DEVICE — BEAR TIB/KN VANGUARD AS 10X67MM NS: Type: IMPLANTABLE DEVICE | Site: KNEE | Status: FUNCTIONAL

## 2021-07-26 DEVICE — COMP FEM/KN VANGUARD INTLK CR 60MM NS LT: Type: IMPLANTABLE DEVICE | Site: KNEE | Status: FUNCTIONAL

## 2021-07-26 DEVICE — CAP TOTL KN CMT PRIMARY: Type: IMPLANTABLE DEVICE | Site: KNEE | Status: FUNCTIONAL

## 2021-07-26 DEVICE — CMT BONE PALACOS R HI/VISC 1X40: Type: IMPLANTABLE DEVICE | Site: KNEE | Status: FUNCTIONAL

## 2021-07-26 DEVICE — PAT 3PEG STD 8X31MM: Type: IMPLANTABLE DEVICE | Site: KNEE | Status: FUNCTIONAL

## 2021-07-26 RX ORDER — CEFAZOLIN SODIUM 2 G/100ML
2 INJECTION, SOLUTION INTRAVENOUS ONCE
Status: COMPLETED | OUTPATIENT
Start: 2021-07-26 | End: 2021-07-26

## 2021-07-26 RX ORDER — AMOXICILLIN 250 MG
2 CAPSULE ORAL 2 TIMES DAILY
Status: DISCONTINUED | OUTPATIENT
Start: 2021-07-26 | End: 2021-07-27 | Stop reason: HOSPADM

## 2021-07-26 RX ORDER — PROPOFOL 10 MG/ML
VIAL (ML) INTRAVENOUS AS NEEDED
Status: DISCONTINUED | OUTPATIENT
Start: 2021-07-26 | End: 2021-07-26 | Stop reason: SURG

## 2021-07-26 RX ORDER — MAGNESIUM HYDROXIDE 1200 MG/15ML
LIQUID ORAL AS NEEDED
Status: DISCONTINUED | OUTPATIENT
Start: 2021-07-26 | End: 2021-07-26 | Stop reason: HOSPADM

## 2021-07-26 RX ORDER — ASPIRIN 81 MG/1
81 TABLET, CHEWABLE ORAL NIGHTLY
Status: DISCONTINUED | OUTPATIENT
Start: 2021-07-26 | End: 2021-07-27

## 2021-07-26 RX ORDER — OXYCODONE HYDROCHLORIDE 5 MG/1
5 TABLET ORAL
Status: DISCONTINUED | OUTPATIENT
Start: 2021-07-26 | End: 2021-07-26 | Stop reason: HOSPADM

## 2021-07-26 RX ORDER — SODIUM CHLORIDE 0.9 % (FLUSH) 0.9 %
10 SYRINGE (ML) INJECTION AS NEEDED
Status: DISCONTINUED | OUTPATIENT
Start: 2021-07-26 | End: 2021-07-26 | Stop reason: HOSPADM

## 2021-07-26 RX ORDER — MIDAZOLAM HYDROCHLORIDE 2 MG/2ML
2 INJECTION, SOLUTION INTRAMUSCULAR; INTRAVENOUS ONCE
Status: COMPLETED | OUTPATIENT
Start: 2021-07-26 | End: 2021-07-26

## 2021-07-26 RX ORDER — CEFAZOLIN SODIUM 2 G/100ML
2 INJECTION, SOLUTION INTRAVENOUS EVERY 8 HOURS
Status: COMPLETED | OUTPATIENT
Start: 2021-07-26 | End: 2021-07-27

## 2021-07-26 RX ORDER — LIDOCAINE HYDROCHLORIDE 20 MG/ML
INJECTION, SOLUTION INFILTRATION; PERINEURAL AS NEEDED
Status: DISCONTINUED | OUTPATIENT
Start: 2021-07-26 | End: 2021-07-26 | Stop reason: SURG

## 2021-07-26 RX ORDER — MEPERIDINE HYDROCHLORIDE 25 MG/ML
12.5 INJECTION INTRAMUSCULAR; INTRAVENOUS; SUBCUTANEOUS
Status: DISCONTINUED | OUTPATIENT
Start: 2021-07-26 | End: 2021-07-26 | Stop reason: HOSPADM

## 2021-07-26 RX ORDER — FAMOTIDINE 20 MG/1
40 TABLET, FILM COATED ORAL DAILY
Status: DISCONTINUED | OUTPATIENT
Start: 2021-07-26 | End: 2021-07-27 | Stop reason: HOSPADM

## 2021-07-26 RX ORDER — GLYCOPYRROLATE 0.2 MG/ML
0.2 INJECTION INTRAMUSCULAR; INTRAVENOUS
Status: COMPLETED | OUTPATIENT
Start: 2021-07-26 | End: 2021-07-26

## 2021-07-26 RX ORDER — SODIUM CHLORIDE, SODIUM LACTATE, POTASSIUM CHLORIDE, CALCIUM CHLORIDE 600; 310; 30; 20 MG/100ML; MG/100ML; MG/100ML; MG/100ML
100 INJECTION, SOLUTION INTRAVENOUS CONTINUOUS
Status: DISCONTINUED | OUTPATIENT
Start: 2021-07-26 | End: 2021-07-27 | Stop reason: HOSPADM

## 2021-07-26 RX ORDER — ONDANSETRON 2 MG/ML
4 INJECTION INTRAMUSCULAR; INTRAVENOUS EVERY 6 HOURS PRN
Status: DISCONTINUED | OUTPATIENT
Start: 2021-07-26 | End: 2021-07-27 | Stop reason: HOSPADM

## 2021-07-26 RX ORDER — AMOXICILLIN 250 MG
2 CAPSULE ORAL 2 TIMES DAILY
Status: DISCONTINUED | OUTPATIENT
Start: 2021-07-26 | End: 2021-07-26

## 2021-07-26 RX ORDER — ROPIVACAINE HYDROCHLORIDE 5 MG/ML
INJECTION, SOLUTION EPIDURAL; INFILTRATION; PERINEURAL
Status: COMPLETED | OUTPATIENT
Start: 2021-07-26 | End: 2021-07-26

## 2021-07-26 RX ORDER — BISACODYL 5 MG/1
5 TABLET, DELAYED RELEASE ORAL DAILY PRN
Status: DISCONTINUED | OUTPATIENT
Start: 2021-07-26 | End: 2021-07-27 | Stop reason: HOSPADM

## 2021-07-26 RX ORDER — CELECOXIB 100 MG/1
200 CAPSULE ORAL ONCE
Status: COMPLETED | OUTPATIENT
Start: 2021-07-26 | End: 2021-07-26

## 2021-07-26 RX ORDER — PROMETHAZINE HYDROCHLORIDE 12.5 MG/1
25 TABLET ORAL ONCE AS NEEDED
Status: DISCONTINUED | OUTPATIENT
Start: 2021-07-26 | End: 2021-07-26 | Stop reason: HOSPADM

## 2021-07-26 RX ORDER — AMLODIPINE BESYLATE 2.5 MG/1
2.5 TABLET ORAL
Status: DISCONTINUED | OUTPATIENT
Start: 2021-07-27 | End: 2021-07-27 | Stop reason: HOSPADM

## 2021-07-26 RX ORDER — HYDROCODONE BITARTRATE AND ACETAMINOPHEN 7.5; 325 MG/1; MG/1
2 TABLET ORAL EVERY 4 HOURS PRN
Status: DISCONTINUED | OUTPATIENT
Start: 2021-07-26 | End: 2021-07-27 | Stop reason: HOSPADM

## 2021-07-26 RX ORDER — HYDROCODONE BITARTRATE AND ACETAMINOPHEN 7.5; 325 MG/1; MG/1
1 TABLET ORAL EVERY 4 HOURS PRN
Status: DISCONTINUED | OUTPATIENT
Start: 2021-07-26 | End: 2021-07-27 | Stop reason: HOSPADM

## 2021-07-26 RX ORDER — MORPHINE SULFATE 2 MG/ML
4 INJECTION, SOLUTION INTRAMUSCULAR; INTRAVENOUS
Status: DISCONTINUED | OUTPATIENT
Start: 2021-07-26 | End: 2021-07-27 | Stop reason: HOSPADM

## 2021-07-26 RX ORDER — ONDANSETRON 4 MG/1
4 TABLET, FILM COATED ORAL EVERY 6 HOURS PRN
Status: DISCONTINUED | OUTPATIENT
Start: 2021-07-26 | End: 2021-07-27 | Stop reason: HOSPADM

## 2021-07-26 RX ORDER — PROMETHAZINE HYDROCHLORIDE 25 MG/1
25 SUPPOSITORY RECTAL ONCE AS NEEDED
Status: DISCONTINUED | OUTPATIENT
Start: 2021-07-26 | End: 2021-07-26 | Stop reason: HOSPADM

## 2021-07-26 RX ORDER — TRANEXAMIC ACID 10 MG/ML
1000 INJECTION, SOLUTION INTRAVENOUS ONCE
Status: COMPLETED | OUTPATIENT
Start: 2021-07-26 | End: 2021-07-26

## 2021-07-26 RX ORDER — TRAMADOL HYDROCHLORIDE 50 MG/1
50 TABLET ORAL EVERY 8 HOURS PRN
Status: DISCONTINUED | OUTPATIENT
Start: 2021-07-26 | End: 2021-07-27 | Stop reason: HOSPADM

## 2021-07-26 RX ORDER — POLYETHYLENE GLYCOL 3350 17 G/17G
17 POWDER, FOR SOLUTION ORAL DAILY PRN
Status: DISCONTINUED | OUTPATIENT
Start: 2021-07-26 | End: 2021-07-27 | Stop reason: HOSPADM

## 2021-07-26 RX ORDER — ACETAMINOPHEN 500 MG
1000 TABLET ORAL ONCE
Status: COMPLETED | OUTPATIENT
Start: 2021-07-26 | End: 2021-07-26

## 2021-07-26 RX ORDER — BISACODYL 10 MG
10 SUPPOSITORY, RECTAL RECTAL DAILY PRN
Status: DISCONTINUED | OUTPATIENT
Start: 2021-07-26 | End: 2021-07-27 | Stop reason: HOSPADM

## 2021-07-26 RX ORDER — ATORVASTATIN CALCIUM 10 MG/1
10 TABLET, FILM COATED ORAL NIGHTLY
Status: DISCONTINUED | OUTPATIENT
Start: 2021-07-26 | End: 2021-07-27 | Stop reason: HOSPADM

## 2021-07-26 RX ORDER — NALOXONE HCL 0.4 MG/ML
0.4 VIAL (ML) INJECTION
Status: DISCONTINUED | OUTPATIENT
Start: 2021-07-26 | End: 2021-07-27 | Stop reason: HOSPADM

## 2021-07-26 RX ORDER — KETOROLAC TROMETHAMINE 30 MG/ML
15 INJECTION, SOLUTION INTRAMUSCULAR; INTRAVENOUS EVERY 6 HOURS SCHEDULED
Status: COMPLETED | OUTPATIENT
Start: 2021-07-26 | End: 2021-07-27

## 2021-07-26 RX ORDER — CETIRIZINE HYDROCHLORIDE 10 MG/1
10 TABLET ORAL DAILY
Status: DISCONTINUED | OUTPATIENT
Start: 2021-07-27 | End: 2021-07-27 | Stop reason: HOSPADM

## 2021-07-26 RX ORDER — EPHEDRINE SULFATE 50 MG/ML
INJECTION, SOLUTION INTRAVENOUS AS NEEDED
Status: DISCONTINUED | OUTPATIENT
Start: 2021-07-26 | End: 2021-07-26 | Stop reason: SURG

## 2021-07-26 RX ORDER — FENTANYL CITRATE 50 UG/ML
INJECTION, SOLUTION INTRAMUSCULAR; INTRAVENOUS AS NEEDED
Status: DISCONTINUED | OUTPATIENT
Start: 2021-07-26 | End: 2021-07-26 | Stop reason: SURG

## 2021-07-26 RX ORDER — SODIUM CHLORIDE, SODIUM LACTATE, POTASSIUM CHLORIDE, CALCIUM CHLORIDE 600; 310; 30; 20 MG/100ML; MG/100ML; MG/100ML; MG/100ML
9 INJECTION, SOLUTION INTRAVENOUS CONTINUOUS PRN
Status: DISCONTINUED | OUTPATIENT
Start: 2021-07-26 | End: 2021-07-27 | Stop reason: HOSPADM

## 2021-07-26 RX ORDER — GABAPENTIN 300 MG/1
600 CAPSULE ORAL ONCE
Status: COMPLETED | OUTPATIENT
Start: 2021-07-26 | End: 2021-07-26

## 2021-07-26 RX ORDER — MONTELUKAST SODIUM 10 MG/1
10 TABLET ORAL NIGHTLY
Status: DISCONTINUED | OUTPATIENT
Start: 2021-07-26 | End: 2021-07-27 | Stop reason: HOSPADM

## 2021-07-26 RX ORDER — CEFAZOLIN SODIUM IN 0.9 % NACL 3 G/100 ML
3 INTRAVENOUS SOLUTION, PIGGYBACK (ML) INTRAVENOUS ONCE
Status: DISCONTINUED | OUTPATIENT
Start: 2021-07-26 | End: 2021-07-26

## 2021-07-26 RX ORDER — ONDANSETRON 2 MG/ML
4 INJECTION INTRAMUSCULAR; INTRAVENOUS ONCE AS NEEDED
Status: DISCONTINUED | OUTPATIENT
Start: 2021-07-26 | End: 2021-07-26 | Stop reason: HOSPADM

## 2021-07-26 RX ADMIN — MIDAZOLAM HYDROCHLORIDE 2 MG: 1 INJECTION, SOLUTION INTRAMUSCULAR; INTRAVENOUS at 11:57

## 2021-07-26 RX ADMIN — FENTANYL CITRATE 25 MCG: 50 INJECTION INTRAMUSCULAR; INTRAVENOUS at 12:35

## 2021-07-26 RX ADMIN — ROPIVACAINE HYDROCHLORIDE 40 ML: 5 INJECTION, SOLUTION EPIDURAL; INFILTRATION; PERINEURAL at 12:21

## 2021-07-26 RX ADMIN — CEFAZOLIN SODIUM 2 G: 2 INJECTION, SOLUTION INTRAVENOUS at 12:30

## 2021-07-26 RX ADMIN — LIDOCAINE HYDROCHLORIDE 100 MG: 20 INJECTION, SOLUTION INFILTRATION; PERINEURAL at 12:35

## 2021-07-26 RX ADMIN — FENTANYL CITRATE 25 MCG: 50 INJECTION INTRAMUSCULAR; INTRAVENOUS at 13:00

## 2021-07-26 RX ADMIN — EPHEDRINE SULFATE 10 MG: 50 INJECTION INTRAVENOUS at 13:30

## 2021-07-26 RX ADMIN — GLYCOPYRROLATE 0.2 MG: 0.2 INJECTION INTRAMUSCULAR; INTRAVENOUS at 11:57

## 2021-07-26 RX ADMIN — CELECOXIB 200 MG: 100 CAPSULE ORAL at 10:31

## 2021-07-26 RX ADMIN — PROPOFOL 150 MG: 10 INJECTION, EMULSION INTRAVENOUS at 12:35

## 2021-07-26 RX ADMIN — TRANEXAMIC ACID 1000 MG: 10 INJECTION, SOLUTION INTRAVENOUS at 13:34

## 2021-07-26 RX ADMIN — ACETAMINOPHEN 1000 MG: 500 TABLET, FILM COATED ORAL at 10:31

## 2021-07-26 RX ADMIN — SODIUM CHLORIDE, POTASSIUM CHLORIDE, SODIUM LACTATE AND CALCIUM CHLORIDE 9 ML/HR: 600; 310; 30; 20 INJECTION, SOLUTION INTRAVENOUS at 10:30

## 2021-07-26 RX ADMIN — TRANEXAMIC ACID 1000 MG: 10 INJECTION, SOLUTION INTRAVENOUS at 11:58

## 2021-07-26 RX ADMIN — CEFAZOLIN SODIUM 2 G: 2 INJECTION, SOLUTION INTRAVENOUS at 21:09

## 2021-07-26 RX ADMIN — GABAPENTIN 600 MG: 300 CAPSULE ORAL at 10:31

## 2021-07-26 RX ADMIN — DOCUSATE SODIUM 50MG AND SENNOSIDES 8.6MG 2 TABLET: 8.6; 5 TABLET, FILM COATED ORAL at 21:09

## 2021-07-26 RX ADMIN — KETOROLAC TROMETHAMINE 15 MG: 30 INJECTION, SOLUTION INTRAMUSCULAR; INTRAVENOUS at 18:13

## 2021-07-26 NOTE — ANESTHESIA POSTPROCEDURE EVALUATION
Patient: Yeimy Yang    Procedure Summary     Date: 07/26/21 Room / Location: [facility] OR 01 / [facility] MAIN OR    Anesthesia Start: 1228 Anesthesia Stop: 1415    Procedure: LEFT TOTAL KNEE ARTHROPLASTY (Left Knee) Diagnosis: (LEFT KNEE OA)    Surgeons: Issac Pierce MD Provider: Goldberg, Michael, MD    Anesthesia Type: general with block ASA Status: 3          Anesthesia Type: general with block    Vitals  Vitals Value Taken Time   /71 07/26/21 1445   Temp     Pulse 63 07/26/21 1446   Resp 12 07/26/21 1445   SpO2 99 % 07/26/21 1446   Vitals shown include unvalidated device data.        Post Anesthesia Care and Evaluation    Patient location during evaluation: bedside  Patient participation: complete - patient participated  Level of consciousness: awake  Pain score: 0  Pain management: adequate  Airway patency: patent  Anesthetic complications: No anesthetic complications  PONV Status: none  Cardiovascular status: acceptable and stable  Respiratory status: acceptable and room air  Hydration status: acceptable    Comments: An Anesthesiologist personally participated in the most demanding procedures (including induction and emergence if applicable) in the anesthesia plan, monitored the course of anesthesia administration at frequent intervals and remained physically present and available for immediate diagnosis and treatment of emergencies.

## 2021-07-26 NOTE — ANESTHESIA PREPROCEDURE EVALUATION
Anesthesia Evaluation     Patient summary reviewed and Nursing notes reviewed   NPO Solid Status: > 6 hours  NPO Liquid Status: > 6 hours           Airway   Mallampati: II  TM distance: <3 FB  Possible difficult intubation  Dental      Pulmonary - normal exam   Cardiovascular - normal exam  Exercise tolerance: good (4-7 METS)    (+) hypertension, hyperlipidemia,       Neuro/Psych  (+) CVA, numbness,     GI/Hepatic/Renal/Endo      Musculoskeletal     Abdominal    Substance History      OB/GYN          Other   arthritis,                      Anesthesia Plan    ASA 3     general with block     intravenous induction   Postoperative Plan: Expected vent after surgery  Anesthetic plan, all risks, benefits, and alternatives have been provided, discussed and informed consent has been obtained with: patient.

## 2021-07-26 NOTE — ANESTHESIA PROCEDURE NOTES
Peripheral Block      Patient reassessed immediately prior to procedure    Patient location during procedure: pre-op  Start time: 7/26/2021 12:17 PM  Stop time: 7/26/2021 12:21 PM  Reason for block: at surgeon's request and post-op pain management  Performed by  Anesthesiologist: Goldberg, Michael, MD  Preanesthetic Checklist  Completed: patient identified, IV checked, site marked, risks and benefits discussed, surgical consent, monitors and equipment checked, pre-op evaluation and timeout performed  Prep:  Pt Position: supine  Sterile barriers:alcohol skin prep, partial drape, cap, washed/disinfected hands, mask and gloves  Prep: ChloraPrep  Patient monitoring: blood pressure monitoring, continuous pulse oximetry and EKG  Procedure  Sedation:yes  Performed under: local infiltration  Guidance:ultrasound guided and nerve stimulator  ULTRASOUND INTERPRETATION. Using ultrasound guidance a 20 G gauge needle was placed in close proximity to the nerve, at which point, under ultrasound guidance anesthetic was injected in the area of the nerve and spread of the anesthesia was seen on ultrasound in close proximity thereto.  There were no abnormalities seen on ultrasound; a digital image was taken; and the patient tolerated the procedure with no complications. Images:still images obtained, printed/placed on chart    Laterality:left  Block Type:adductor canal block  Injection Technique:single-shot  Needle Type:echogenic  Needle Gauge:20 G (4in)  Resistance on Injection: none    Medications Used: ropivacaine (NAROPIN) 0.5 % injection, 40 mL  Med admintered at 7/26/2021 12:21 PM      Post Assessment  Injection Assessment: negative aspiration for heme, no paresthesia on injection and incremental injection  Patient Tolerance:comfortable throughout block  Complications:no  Additional Notes  The block or continuous infusion is requested by the referring physician for management of postoperative pain, or pain related to a procedure.  Ultrasound guidance (deemed medically necessary). Painless injection, pt was awake and conversant during the procedure without complications. Needle and surrounding structures visualized throughout procedure. No adverse reactions or complications seen during this period. Post-procedure image showed no signs of complication, and anatomy was consistent with an uncomplicated nerve blockade.

## 2021-07-27 VITALS
HEIGHT: 61 IN | WEIGHT: 170.86 LBS | HEART RATE: 96 BPM | RESPIRATION RATE: 18 BRPM | BODY MASS INDEX: 32.26 KG/M2 | TEMPERATURE: 98.1 F | DIASTOLIC BLOOD PRESSURE: 43 MMHG | SYSTOLIC BLOOD PRESSURE: 109 MMHG | OXYGEN SATURATION: 94 %

## 2021-07-27 LAB
ANION GAP SERPL CALCULATED.3IONS-SCNC: 9.5 MMOL/L (ref 5–15)
BUN SERPL-MCNC: 11 MG/DL (ref 8–23)
BUN/CREAT SERPL: 16.7 (ref 7–25)
CALCIUM SPEC-SCNC: 8.8 MG/DL (ref 8.6–10.5)
CHLORIDE SERPL-SCNC: 101 MMOL/L (ref 98–107)
CO2 SERPL-SCNC: 24.5 MMOL/L (ref 22–29)
CREAT SERPL-MCNC: 0.66 MG/DL (ref 0.57–1)
DEPRECATED RDW RBC AUTO: 43.6 FL (ref 37–54)
ERYTHROCYTE [DISTWIDTH] IN BLOOD BY AUTOMATED COUNT: 13.2 % (ref 12.3–15.4)
GFR SERPL CREATININE-BSD FRML MDRD: 87 ML/MIN/1.73
GLUCOSE SERPL-MCNC: 103 MG/DL (ref 65–99)
HCT VFR BLD AUTO: 34.9 % (ref 34–46.6)
HGB BLD-MCNC: 11.7 G/DL (ref 12–15.9)
MCH RBC QN AUTO: 29.8 PG (ref 26.6–33)
MCHC RBC AUTO-ENTMCNC: 33.5 G/DL (ref 31.5–35.7)
MCV RBC AUTO: 89 FL (ref 79–97)
PLATELET # BLD AUTO: 205 10*3/MM3 (ref 140–450)
PMV BLD AUTO: 9.9 FL (ref 6–12)
POTASSIUM SERPL-SCNC: 3.7 MMOL/L (ref 3.5–5.2)
RBC # BLD AUTO: 3.92 10*6/MM3 (ref 3.77–5.28)
SODIUM SERPL-SCNC: 135 MMOL/L (ref 136–145)
WBC # BLD AUTO: 10.82 10*3/MM3 (ref 3.4–10.8)

## 2021-07-27 PROCEDURE — A9270 NON-COVERED ITEM OR SERVICE: HCPCS | Performed by: HOSPITALIST

## 2021-07-27 PROCEDURE — 25010000002 ENOXAPARIN PER 10 MG: Performed by: ORTHOPAEDIC SURGERY

## 2021-07-27 PROCEDURE — 25010000002 KETOROLAC TROMETHAMINE PER 15 MG: Performed by: ORTHOPAEDIC SURGERY

## 2021-07-27 PROCEDURE — 63710000001 CETIRIZINE 10 MG TABLET: Performed by: HOSPITALIST

## 2021-07-27 PROCEDURE — 63710000001 FAMOTIDINE 20 MG TABLET: Performed by: ORTHOPAEDIC SURGERY

## 2021-07-27 PROCEDURE — A9270 NON-COVERED ITEM OR SERVICE: HCPCS | Performed by: ORTHOPAEDIC SURGERY

## 2021-07-27 PROCEDURE — 97116 GAIT TRAINING THERAPY: CPT

## 2021-07-27 PROCEDURE — 80048 BASIC METABOLIC PNL TOTAL CA: CPT | Performed by: ORTHOPAEDIC SURGERY

## 2021-07-27 PROCEDURE — 63710000001 HYDROCODONE-ACETAMINOPHEN 7.5-325 MG TABLET: Performed by: ORTHOPAEDIC SURGERY

## 2021-07-27 PROCEDURE — 97150 GROUP THERAPEUTIC PROCEDURES: CPT

## 2021-07-27 PROCEDURE — 85027 COMPLETE CBC AUTOMATED: CPT | Performed by: ORTHOPAEDIC SURGERY

## 2021-07-27 PROCEDURE — 99024 POSTOP FOLLOW-UP VISIT: CPT | Performed by: ORTHOPAEDIC SURGERY

## 2021-07-27 PROCEDURE — 97535 SELF CARE MNGMENT TRAINING: CPT

## 2021-07-27 PROCEDURE — 97165 OT EVAL LOW COMPLEX 30 MIN: CPT

## 2021-07-27 PROCEDURE — 63710000001 SERTRALINE 50 MG TABLET: Performed by: HOSPITALIST

## 2021-07-27 PROCEDURE — 25010000003 CEFAZOLIN IN DEXTROSE 2-4 GM/100ML-% SOLUTION: Performed by: ORTHOPAEDIC SURGERY

## 2021-07-27 PROCEDURE — 99213 OFFICE O/P EST LOW 20 MIN: CPT | Performed by: INTERNAL MEDICINE

## 2021-07-27 PROCEDURE — 63710000001 SENNOSIDES-DOCUSATE 8.6-50 MG TABLET: Performed by: HOSPITALIST

## 2021-07-27 RX ORDER — HYDROCODONE BITARTRATE AND ACETAMINOPHEN 7.5; 325 MG/1; MG/1
1-2 TABLET ORAL EVERY 4 HOURS PRN
Qty: 40 TABLET | Refills: 0 | Status: SHIPPED | OUTPATIENT
Start: 2021-07-27 | End: 2021-08-05 | Stop reason: SDUPTHER

## 2021-07-27 RX ADMIN — FAMOTIDINE 40 MG: 20 TABLET ORAL at 08:45

## 2021-07-27 RX ADMIN — KETOROLAC TROMETHAMINE 15 MG: 30 INJECTION, SOLUTION INTRAMUSCULAR; INTRAVENOUS at 05:49

## 2021-07-27 RX ADMIN — KETOROLAC TROMETHAMINE 15 MG: 30 INJECTION, SOLUTION INTRAMUSCULAR; INTRAVENOUS at 12:20

## 2021-07-27 RX ADMIN — CETIRIZINE HYDROCHLORIDE 10 MG: 10 TABLET, FILM COATED ORAL at 08:45

## 2021-07-27 RX ADMIN — KETOROLAC TROMETHAMINE 15 MG: 30 INJECTION, SOLUTION INTRAMUSCULAR; INTRAVENOUS at 00:30

## 2021-07-27 RX ADMIN — ENOXAPARIN SODIUM 40 MG: 40 INJECTION SUBCUTANEOUS at 08:45

## 2021-07-27 RX ADMIN — DOCUSATE SODIUM 50MG AND SENNOSIDES 8.6MG 2 TABLET: 8.6; 5 TABLET, FILM COATED ORAL at 08:45

## 2021-07-27 RX ADMIN — HYDROCODONE BITARTRATE AND ACETAMINOPHEN 2 TABLET: 7.5; 325 TABLET ORAL at 08:44

## 2021-07-27 RX ADMIN — SERTRALINE HYDROCHLORIDE 50 MG: 50 TABLET ORAL at 08:44

## 2021-07-27 RX ADMIN — HYDROCODONE BITARTRATE AND ACETAMINOPHEN 2 TABLET: 7.5; 325 TABLET ORAL at 13:04

## 2021-07-27 RX ADMIN — SODIUM CHLORIDE, POTASSIUM CHLORIDE, SODIUM LACTATE AND CALCIUM CHLORIDE 100 ML/HR: 600; 310; 30; 20 INJECTION, SOLUTION INTRAVENOUS at 00:00

## 2021-07-27 RX ADMIN — CEFAZOLIN SODIUM 2 G: 2 INJECTION, SOLUTION INTRAVENOUS at 05:20

## 2021-08-03 ENCOUNTER — TELEPHONE (OUTPATIENT)
Dept: FAMILY MEDICINE CLINIC | Facility: CLINIC | Age: 76
End: 2021-08-03

## 2021-08-03 NOTE — TELEPHONE ENCOUNTER
THE PATIENT HAS MISSED A CALL FROM THE OFFICE, STATING SHE COULD NOT UNDERSTAND THE VOICEMAIL. PATIENT MENTIONED it was possibly about being referred to physical therapy.     Please call and advise: [phone]

## 2021-08-05 DIAGNOSIS — Z47.1 AFTERCARE FOLLOWING LEFT KNEE JOINT REPLACEMENT SURGERY: Primary | ICD-10-CM

## 2021-08-05 DIAGNOSIS — Z96.652 AFTERCARE FOLLOWING LEFT KNEE JOINT REPLACEMENT SURGERY: Primary | ICD-10-CM

## 2021-08-05 DIAGNOSIS — M17.12 PRIMARY OSTEOARTHRITIS OF LEFT KNEE: ICD-10-CM

## 2021-08-05 RX ORDER — HYDROCODONE BITARTRATE AND ACETAMINOPHEN 7.5; 325 MG/1; MG/1
1 TABLET ORAL EVERY 4 HOURS PRN
Qty: 42 TABLET | Refills: 0 | Status: SHIPPED | OUTPATIENT
Start: 2021-08-05 | End: 2022-05-25

## 2021-08-12 ENCOUNTER — OFFICE VISIT (OUTPATIENT)
Dept: ORTHOPEDIC SURGERY | Facility: CLINIC | Age: 76
End: 2021-08-12

## 2021-08-12 VITALS — HEIGHT: 61 IN | BODY MASS INDEX: 31.91 KG/M2 | WEIGHT: 169 LBS | HEART RATE: 62 BPM | OXYGEN SATURATION: 96 %

## 2021-08-12 DIAGNOSIS — Z96.652 AFTERCARE FOLLOWING LEFT KNEE JOINT REPLACEMENT SURGERY: ICD-10-CM

## 2021-08-12 DIAGNOSIS — M25.562 LEFT KNEE PAIN, UNSPECIFIED CHRONICITY: Primary | ICD-10-CM

## 2021-08-12 DIAGNOSIS — Z47.1 AFTERCARE FOLLOWING LEFT KNEE JOINT REPLACEMENT SURGERY: ICD-10-CM

## 2021-08-12 PROCEDURE — 99024 POSTOP FOLLOW-UP VISIT: CPT | Performed by: PHYSICIAN ASSISTANT

## 2021-08-12 NOTE — PATIENT INSTRUCTIONS
Keep incision clean and dry  Continue ice and elevation for associated swelling  Continue with stretching , ROM and strengthening exercises at PT.   Advised patient on falls precautions  Follow up in 1 month

## 2021-08-12 NOTE — PROGRESS NOTES
"Chief Complaint  Pain of the Left Knee    Subjective          Yeimy Yang presents to [facility] for s/p left total knee arthroplasty on 07/26/21 by Dr. Pierce. Patient states she has a lot of pain with PT. She states despite the pain, she is progressing in PT and doing better with her ROM. She states ice machine shocked her, so she has been unable to use. She returned for new one today. She states swelling has been well controlled using just a regular cold pack. She denies fever, chills, numbness or tingling.     Objective   Vital Signs:   Pulse 62   Ht 154.9 cm (61\")   Wt 76.7 kg (169 lb)   SpO2 96%   BMI 31.93 kg/m²       Physical Exam  Constitutional:       Appearance: Normal appearance. He is well-developed and normal weight.   HENT:      Head: Normocephalic.      Right Ear: Hearing and external ear normal.      Left Ear: Hearing and external ear normal.      Nose: Nose normal.   Eyes:      Conjunctiva/sclera: Conjunctivae normal.   Cardiovascular:      Rate and Rhythm: Normal rate.   Pulmonary:      Effort: Pulmonary effort is normal.      Breath sounds: No wheezing or rales.   Abdominal:      Palpations: Abdomen is soft.      Tenderness: There is no abdominal tenderness.   Musculoskeletal:      Cervical back: Normal range of motion.   Skin:     Findings: No rash.   Neurological:      Mental Status: He is alert and oriented to person, place, and time.   Psychiatric:         Mood and Affect: Mood and affect normal.         Judgment: Judgment normal.     Ortho Exam  Left knee: Patient uses walker for ambulation assistance.  Sensation intact.  Neurovascular intact.  Calf is soft and nontender.  Negative Homans.  Swelling across the joint.  Well-healing incision, no active signs of drainage, heat or redness.  AROM is -8 to 90 degrees of flexion.  Stable to varus and valgus stress.  Good muscle tone of the quadriceps and hamstrings muscles.  Normal plantar dorsiflexion.  " Good muscle tone of the ankle flexors.    Result Review :   The following data was reviewed by: MILO Katz on 08/12/2021:         Imaging Results (Most Recent)     Procedure Component Value Units Date/Time    XR Knee 3 View Left [[id number]] Resulted: 08/12/21 1035     Updated: 08/12/21 1036    Narrative:      X-Ray Report:  Study: X-rays ordered, taken in the office, and reviewed today  Site: left knee Xray  Indication: left knee pain   View: AP, Lateral and Sunrise view(s)  Findings: Good alignment of left total knee arthroplasty. No signs of   hardware failure or loosening.   Prior studies available for comparison: no                   Assessment and Plan    Problem List Items Addressed This Visit        Musculoskeletal and Injuries    Left knee pain - Primary    Relevant Orders    XR Knee 3 View Left (Completed)    Aftercare following left knee joint replacement surgery          Follow Up   Return in about 4 weeks (around 9/9/2021).  Patient Instructions   Keep incision clean and dry  Continue ice and elevation for associated swelling  Continue with stretching , ROM and strengthening exercises at PT.   Advised patient on falls precautions  Follow up in 1 month      Patient was given instructions and counseling regarding her condition or for health maintenance advice. Please see specific information pulled into the AVS if appropriate.

## 2021-08-31 PROCEDURE — 87088 URINE BACTERIA CULTURE: CPT | Performed by: FAMILY MEDICINE

## 2021-08-31 PROCEDURE — 87186 SC STD MICRODIL/AGAR DIL: CPT | Performed by: FAMILY MEDICINE

## 2021-08-31 PROCEDURE — 87086 URINE CULTURE/COLONY COUNT: CPT | Performed by: FAMILY MEDICINE

## 2021-09-03 ENCOUNTER — OFFICE VISIT (OUTPATIENT)
Dept: FAMILY MEDICINE CLINIC | Facility: CLINIC | Age: 76
End: 2021-09-03

## 2021-09-03 ENCOUNTER — TELEPHONE (OUTPATIENT)
Dept: URGENT CARE | Facility: CLINIC | Age: 76
End: 2021-09-03

## 2021-09-03 VITALS — RESPIRATION RATE: 18 BRPM | TEMPERATURE: 97.5 F | HEART RATE: 85 BPM | OXYGEN SATURATION: 96 %

## 2021-09-03 DIAGNOSIS — R05.9 COUGH: Primary | ICD-10-CM

## 2021-09-03 DIAGNOSIS — R53.83 FATIGUE, UNSPECIFIED TYPE: ICD-10-CM

## 2021-09-03 DIAGNOSIS — R09.89 CHEST CONGESTION: ICD-10-CM

## 2021-09-03 PROCEDURE — 99213 OFFICE O/P EST LOW 20 MIN: CPT | Performed by: NURSE PRACTITIONER

## 2021-09-03 PROCEDURE — U0005 INFEC AGEN DETEC AMPLI PROBE: HCPCS | Performed by: NURSE PRACTITIONER

## 2021-09-03 PROCEDURE — U0004 COV-19 TEST NON-CDC HGH THRU: HCPCS | Performed by: NURSE PRACTITIONER

## 2021-09-03 PROCEDURE — C9803 HOPD COVID-19 SPEC COLLECT: HCPCS | Performed by: NURSE PRACTITIONER

## 2021-09-03 RX ORDER — BROMPHENIRAMINE MALEATE, PSEUDOEPHEDRINE HYDROCHLORIDE, AND DEXTROMETHORPHAN HYDROBROMIDE 2; 30; 10 MG/5ML; MG/5ML; MG/5ML
5 SYRUP ORAL 4 TIMES DAILY PRN
Qty: 200 ML | Refills: 0 | Status: SHIPPED | OUTPATIENT
Start: 2021-09-03 | End: 2021-10-27

## 2021-09-03 NOTE — PROGRESS NOTES
Chief Complaint  Cough, nasal drainage (head congestion/sneezing), and Nausea (no appetite)    Subjective      Phone visit, she couldn't get an internet connection.      History of Present Illness  Yeimy Yang presents to [facility]     She has been on bactrim for a UTI. She has been sneezing with sinus drainage and nausea, poor appetite and not sleeping well. She hasn't felt like going to physical therapy. She missed therapy today and they suggested she have a covid test.       Past Medical History:   • Allergic rhinitis due to allergen   • Arthritis   • Essential hypertension   • Fatigue   • Hyperlipemia   • Plantar fasciitis of right foot   • Primary osteoarthritis of left knee   • Seasonal allergies   • Shoulder pain, right   • Stroke (CMS/HCC)    RIGHT HAND WEAK occassional, BEFORE 2010   • Tear of medial meniscus of left knee, current, initial encounter   • Trochanteric bursitis       Allergies  Patient has no known allergies.    Past Surgical History:   • CATARACT EXTRACTION, BILATERAL   • COLONOSCOPY   • COLONOSCOPY    Procedure: COLONOSCOPY with cold snare;  Surgeon: Rosendo Dawn MD;  Location: [facility];  Service: General;  Laterality: N/A;  colon polyp  internal hemorrhoids   • HYSTERECTOMY   • KNEE ARTHROSCOPY   • THYROIDECTOMY    NODULE REMOVED ? SIDE   • TOTAL KNEE ARTHROPLASTY    Procedure: LEFT TOTAL KNEE ARTHROPLASTY;  Surgeon: Issac Pierce MD;  Location: [facility] MAIN OR;  Service: Orthopedics;  Laterality: Left;       Social History     Tobacco Use   • Smoking status: Never Smoker   • Smokeless tobacco: Never Used   Vaping Use   • Vaping Use: Never used   Substance Use Topics   • Alcohol use: Never   • Drug use: Never       Family History   Problem Relation Age of Onset   • Heart disease Mother    • Arthritis Mother    • Heart disease Father    • Cancer Brother    • Lung cancer Other    • Malig Hyperthermia Neg          Health Maintenance  Due   Topic Date Due   • Pneumococcal Vaccine 65+ (2 of 2 - PPSV23) 01/27/2017   • ZOSTER VACCINE (3 of 3) 09/16/2019   • HEPATITIS C SCREENING  Never done   • DXA SCAN  07/18/2021          Current Outpatient Medications:   •  amLODIPine (NORVASC) 2.5 MG tablet, Take 2.5 mg by mouth 2 (two) times a day., Disp: , Rfl:   •  apixaban (ELIQUIS) 2.5 MG tablet tablet, Take 1 tablet by mouth Every 12 (Twelve) Hours. Once Eliquis is completed, she may restart her baby aspirin daily, Disp: 20 tablet, Rfl: 0  •  ascorbic acid (VITAMIN C) 500 MG tablet, Take 500 mg by mouth Daily., Disp: , Rfl:   •  brompheniramine-pseudoephedrine-DM 30-2-10 MG/5ML syrup, Take 5 mL by mouth 4 (Four) Times a Day As Needed for Allergies., Disp: 200 mL, Rfl: 0  •  cetirizine (zyrTEC) 10 MG tablet, Take 10 mg by mouth Daily., Disp: , Rfl:   •  cholecalciferol (VITAMIN D3) 25 MCG (1000 UT) tablet, Take 1,000 Units by mouth Daily., Disp: , Rfl:   •  Garlic 1000 MG capsule, Take  by mouth., Disp: , Rfl:   •  HYDROcodone-acetaminophen (Norco) 7.5-325 MG per tablet, Take 1 tablet by mouth Every 4 (Four) Hours As Needed for Moderate Pain ., Disp: 42 tablet, Rfl: 0  •  losartan (COZAAR) 50 MG tablet, Take 50 mg by mouth Every Night., Disp: , Rfl:   •  montelukast (SINGULAIR) 10 MG tablet, Take 10 mg by mouth Every Night., Disp: , Rfl:   •  multivitamin with minerals tablet tablet, Take 1 tablet by mouth Daily., Disp: , Rfl:   •  phenazopyridine (PYRIDIUM) 100 MG tablet, Take 1 tablet by mouth 3 (Three) Times a Day As Needed for Bladder Spasms., Disp: 10 tablet, Rfl: 0  •  propranolol (INDERAL) 20 MG tablet, Take 20 mg by mouth 2 (Two) Times a Day., Disp: , Rfl:   •  sertraline (ZOLOFT) 50 MG tablet, Take 50 mg by mouth Every Morning., Disp: , Rfl:   •  simvastatin (ZOCOR) 10 MG tablet, Take 10 mg by mouth Every Night., Disp: , Rfl:   •  sulfamethoxazole-trimethoprim (BACTRIM DS,SEPTRA DS) 800-160 MG per tablet, Take 1 tablet by mouth 2 (Two) Times a Day  for 5 days., Disp: 10 tablet, Rfl: 0  •  Vitamin B Complex-C capsule, Take  by mouth., Disp: , Rfl:   •  Zinc 50 MG capsule, Take  by mouth., Disp: , Rfl:     Immunization History   Administered Date(s) Administered   • COVID-19 (MODERNA) 04/13/2021, 05/11/2021   • Influenza, Unspecified 10/09/2020   • Pneumococcal Conjugate 13-Valent (PCV13) 01/27/2016   • TD Preservative Free 07/09/2019   • Tdap 06/13/2012   • Zostavax 10/17/2013   • Zoster, Unspecified 05/13/2019, 07/22/2019       Objective     There were no vitals filed for this visit.  There is no height or weight on file to calculate BMI.     Physical Exam  Vitals reviewed.   Constitutional:       Appearance: Normal appearance. She is well-developed.   HENT:      Mouth/Throat:      Pharynx: No oropharyngeal exudate.   Cardiovascular:      Rate and Rhythm: Normal rate and regular rhythm.      Heart sounds: Normal heart sounds. No murmur heard.     Pulmonary:      Effort: Pulmonary effort is normal.      Breath sounds: Normal breath sounds.   Neurological:      Mental Status: She is alert and oriented to person, place, and time.      Cranial Nerves: No cranial nerve deficit.      Motor: No weakness.   Psychiatric:         Mood and Affect: Mood and affect normal.             Result Review :                          Assessment and Plan     Diagnoses and all orders for this visit:    1. Cough (Primary)  -     COVID-19,CEPHEID/JOE/BDMAX,COR/ANGÉLICA/PAD/JANNIE IN-HOUSE(OR EMERGENT/ADD-ON),NP SWAB IN TRANSPORT MEDIA 3-4 HR TAT, RT-PCR - Swab, Nasopharynx  -     brompheniramine-pseudoephedrine-DM 30-2-10 MG/5ML syrup; Take 5 mL by mouth 4 (Four) Times a Day As Needed for Allergies.  Dispense: 200 mL; Refill: 0    2. Fatigue, unspecified type  -     COVID-19,CEPHEID/JOE/BDMAX,COR/ANGÉLICA/PAD/JANNIE IN-HOUSE(OR EMERGENT/ADD-ON),NP SWAB IN TRANSPORT MEDIA 3-4 HR TAT, RT-PCR - Swab, Nasopharynx    3. Chest congestion  -     COVID-19,CEPHEID/JOE/BDMAX,COR/ANGÉLICA/PAD/JANNIE IN-HOUSE(OR  EMERGENT/ADD-ON),NP SWAB IN TRANSPORT MEDIA 3-4 HR TAT, RT-PCR - Swab, Nasopharynx  -     brompheniramine-pseudoephedrine-DM 30-2-10 MG/5ML syrup; Take 5 mL by mouth 4 (Four) Times a Day As Needed for Allergies.  Dispense: 200 mL; Refill: 0            Follow Up     Return if symptoms worsen or fail to improve.    Patient was given instructions and counseling regarding her condition or for health maintenance advice. Please see specific information pulled into the AVS if appropriate.              Rhiannon George, APRN

## 2021-09-03 NOTE — TELEPHONE ENCOUNTER
----- Message from SERAFIN Underwood sent at 9/2/2021  5:33 PM EDT -----  Please call the patient regarding her abnormal result. E coli is a common cause of UTI. The antibiotic you were prescribed should take care of this infection. Please complete the course and f/u with your provider if no improvement in symptoms.

## 2021-09-04 LAB — SARS-COV-2 RNA NOSE QL NAA+PROBE: NOT DETECTED

## 2021-09-08 ENCOUNTER — TELEPHONE (OUTPATIENT)
Dept: FAMILY MEDICINE CLINIC | Facility: CLINIC | Age: 76
End: 2021-09-08

## 2021-09-09 ENCOUNTER — OFFICE VISIT (OUTPATIENT)
Dept: ORTHOPEDIC SURGERY | Facility: CLINIC | Age: 76
End: 2021-09-09

## 2021-09-09 VITALS — HEART RATE: 79 BPM | BODY MASS INDEX: 31.1 KG/M2 | OXYGEN SATURATION: 96 % | HEIGHT: 62 IN | WEIGHT: 169 LBS

## 2021-09-09 DIAGNOSIS — Z96.652 AFTERCARE FOLLOWING LEFT KNEE JOINT REPLACEMENT SURGERY: Primary | ICD-10-CM

## 2021-09-09 DIAGNOSIS — Z47.1 AFTERCARE FOLLOWING LEFT KNEE JOINT REPLACEMENT SURGERY: Primary | ICD-10-CM

## 2021-09-09 PROCEDURE — 99024 POSTOP FOLLOW-UP VISIT: CPT | Performed by: PHYSICIAN ASSISTANT

## 2021-09-09 NOTE — PROGRESS NOTES
"Chief Complaint  Pain of the Left Knee    Subjective          Yeimy Yang presents to [facility] for s/p left total knee arthroplasty on 07/26/21 by Dr. Pierce. Patient states she had the best physical therapy session she has had yet yesterday. Patient states she is able to sleep better now because her pain has improved. She states she will still have swelling of the knee. PT states patients pain is around 5/10 with her therapy sessions.     Objective   No Known Allergies    Vital Signs:   Pulse 79   Ht 156.2 cm (61.5\")   Wt 76.7 kg (169 lb)   SpO2 96%   BMI 31.42 kg/m²       Physical Exam  Constitutional:       Appearance: Normal appearance. Patient is well-developed and normal weight.   HENT:      Head: Normocephalic.      Right Ear: Hearing and external ear normal.      Left Ear: Hearing and external ear normal.      Nose: Nose normal.   Eyes:      Conjunctiva/sclera: Conjunctivae normal.   Cardiovascular:      Rate and Rhythm: Normal rate.   Pulmonary:      Effort: Pulmonary effort is normal.      Breath sounds: No wheezing or rales.   Abdominal:      Palpations: Abdomen is soft.      Tenderness: There is no abdominal tenderness.   Musculoskeletal:      Cervical back: Normal range of motion.   Skin:     Findings: No rash.   Neurological:      Mental Status: Patient is alert and oriented to person, place, and time.   Psychiatric:         Mood and Affect: Mood and affect normal.         Judgment: Judgment normal.     Ortho Exam  Left knee: Incision is well-healing.  Steri-Strips removed today.  Mild swelling across the knee.  No tenderness palpation.  No discoloration, atrophy or deformity.  Good muscle tone of quadriceps and hamstrings muscles.  Full weightbearing, normal gait with good heel strike.  AROM is 0 to 110 degrees of flexion.  Stable to varus/valgus stress.  Patella is well tracking.  Normal plantar and dorsiflexion.  Good muscle tone of the ankle flexors.  " Sensation intact, neurovascular intact, posterior tibialis pulse 2+, dorsalis pedis pulse 2+.    Result Review :   The following data was reviewed by: MILO Katz on 09/09/2021:         Imaging Results (Most Recent)     None                Assessment and Plan    Problem List Items Addressed This Visit        Musculoskeletal and Injuries    Aftercare following left knee joint replacement surgery - Primary          Follow Up   Return in about 6 weeks (around 10/21/2021).  Patient Instructions   Recommend ice and elevation for swelling. Ice for twenty minutes, elevate above heart level.   Continue with stretching, ROM and strengthening exercises at PT. Work on exercises at home as well.   Please prevent falls.   Follow up in 6 weeks - repeat films.       Patient was given instructions and counseling regarding her condition or for health maintenance advice. Please see specific information pulled into the AVS if appropriate.

## 2021-09-09 NOTE — PATIENT INSTRUCTIONS
Recommend ice and elevation for swelling. Ice for twenty minutes, elevate above heart level.   Continue with stretching, ROM and strengthening exercises at PT. Work on exercises at home as well.   Please prevent falls.   Follow up in 6 weeks - repeat films.

## 2021-10-21 ENCOUNTER — OFFICE VISIT (OUTPATIENT)
Dept: ORTHOPEDIC SURGERY | Facility: CLINIC | Age: 76
End: 2021-10-21

## 2021-10-21 VITALS — HEIGHT: 62 IN | OXYGEN SATURATION: 100 % | WEIGHT: 172.8 LBS | HEART RATE: 69 BPM | BODY MASS INDEX: 31.8 KG/M2

## 2021-10-21 DIAGNOSIS — Z96.652 AFTERCARE FOLLOWING LEFT KNEE JOINT REPLACEMENT SURGERY: Primary | ICD-10-CM

## 2021-10-21 DIAGNOSIS — Z47.1 AFTERCARE FOLLOWING LEFT KNEE JOINT REPLACEMENT SURGERY: Primary | ICD-10-CM

## 2021-10-21 PROCEDURE — 99024 POSTOP FOLLOW-UP VISIT: CPT | Performed by: PHYSICIAN ASSISTANT

## 2021-10-21 NOTE — PROGRESS NOTES
"Chief Complaint  Pain of the Left Knee    Subjective          Yeimy Yang presents to [facility] for s/p left TKA performed by Dr. Pierce on 07/26/2021.  Patient states that over the past few days she has had pain of her knee.  She states she went to PT yesterday and they help stretch her knee, which was painful at the time but helped afterward.  She states before this she just had a mild pain of her knee.  She states pain is on lateral aspect of knee.  She denies any falls or trauma.  She states swelling is improved.  PT states patient is progressing well and will be discharged this week.  She states she is leaving for [place] for the winter time on November 11.    Objective   No Known Allergies    Vital Signs:   Pulse 69   Ht 156.2 cm (61.5\")   Wt 78.4 kg (172 lb 12.8 oz)   SpO2 100%   BMI 32.12 kg/m²       Physical Exam  Constitutional:       Appearance: Normal appearance. Patient is well-developed and normal weight.   HENT:      Head: Normocephalic.      Right Ear: Hearing and external ear normal.      Left Ear: Hearing and external ear normal.      Nose: Nose normal.   Eyes:      Conjunctiva/sclera: Conjunctivae normal.   Cardiovascular:      Rate and Rhythm: Normal rate.   Pulmonary:      Effort: Pulmonary effort is normal.      Breath sounds: No wheezing or rales.   Abdominal:      Palpations: Abdomen is soft.      Tenderness: There is no abdominal tenderness.   Musculoskeletal:      Cervical back: Normal range of motion.   Skin:     Findings: No rash.   Neurological:      Mental Status: Patient is alert and oriented to person, place, and time.   Psychiatric:         Mood and Affect: Mood and affect normal.         Judgment: Judgment normal.     Ortho Exam  Left knee: Scar well-healed.  Tenderness over lateral proximal tibia.  No swelling, discoloration or deformity.  Full weightbearing, nonantalgic gait, decreased heel strike.  Sensation intact, neurovascular intact, " posterior tibialis pulse 2+, dorsalis pedis pulse 2+.  AROM is -2 to 120 degrees of flexion.  Stable to varus/valgus stress.  Patella well tracking.  Good muscle tone of quadriceps and hamstrings muscles.  Full plantar and dorsiflexion.  Muscle tone of the ankle flexors.    Result Review :   The following data was reviewed by: MILO Katz on 10/21/2021:         Imaging Results (Most Recent)     Procedure Component Value Units Date/Time    XR Knee 3 View Left [[id number]] Resulted: 10/21/21 1039     Updated: 10/21/21 1040    Narrative:      X-Ray Report:  Study: X-rays ordered, taken in the office, and reviewed today  Site: left knee Xray  Indication: left knee pain   View: AP, Lateral and Sunrise view(s)  Findings: well aligned left total knee arthroplasty. No signs of loosening   or hardware failure.   Prior studies available for comparison: yes                   Assessment and Plan    Problem List Items Addressed This Visit        Musculoskeletal and Injuries    Aftercare following left knee joint replacement surgery - Primary    Relevant Orders    XR Knee 3 View Left (Completed)          Follow Up   Return in about 9 months (around 7/21/2022).  Patient Instructions   Continue with stretches, as demonstrated today in clinic today.   Patient will be discharged from PT this week.   Advised patient on falls precautions  Continue with lifelong antibiotic prophylaxis with dental procedures following total joint replacement.  Follow up in 9 months - call with any changes or concerns.      Patient was given instructions and counseling regarding her condition or for health maintenance advice. Please see specific information pulled into the AVS if appropriate.

## 2021-10-21 NOTE — PATIENT INSTRUCTIONS
Continue with stretches, as demonstrated today in clinic today.   Patient will be discharged from PT this week.   Advised patient on falls precautions  Continue with lifelong antibiotic prophylaxis with dental procedures following total joint replacement.  Follow up in 9 months - call with any changes or concerns.

## 2021-10-27 ENCOUNTER — OFFICE VISIT (OUTPATIENT)
Dept: FAMILY MEDICINE CLINIC | Facility: CLINIC | Age: 76
End: 2021-10-27

## 2021-10-27 VITALS
BODY MASS INDEX: 32.85 KG/M2 | HEIGHT: 61 IN | TEMPERATURE: 97.5 F | WEIGHT: 174 LBS | HEART RATE: 78 BPM | SYSTOLIC BLOOD PRESSURE: 125 MMHG | OXYGEN SATURATION: 98 % | RESPIRATION RATE: 16 BRPM | DIASTOLIC BLOOD PRESSURE: 65 MMHG

## 2021-10-27 DIAGNOSIS — Z23 NEED FOR PNEUMOCOCCAL VACCINE: ICD-10-CM

## 2021-10-27 DIAGNOSIS — E55.9 VITAMIN D DEFICIENCY: ICD-10-CM

## 2021-10-27 DIAGNOSIS — E78.2 MIXED HYPERLIPIDEMIA: ICD-10-CM

## 2021-10-27 DIAGNOSIS — M19.90 ARTHRITIS: ICD-10-CM

## 2021-10-27 DIAGNOSIS — J30.89 ALLERGIC RHINITIS DUE TO OTHER ALLERGIC TRIGGER, UNSPECIFIED SEASONALITY: ICD-10-CM

## 2021-10-27 DIAGNOSIS — Z00.00 ANNUAL PHYSICAL EXAM: Primary | ICD-10-CM

## 2021-10-27 DIAGNOSIS — F41.1 GAD (GENERALIZED ANXIETY DISORDER): ICD-10-CM

## 2021-10-27 DIAGNOSIS — I10 ESSENTIAL HYPERTENSION: ICD-10-CM

## 2021-10-27 PROCEDURE — G0439 PPPS, SUBSEQ VISIT: HCPCS | Performed by: NURSE PRACTITIONER

## 2021-10-27 PROCEDURE — 1126F AMNT PAIN NOTED NONE PRSNT: CPT | Performed by: NURSE PRACTITIONER

## 2021-10-27 PROCEDURE — 1159F MED LIST DOCD IN RCRD: CPT | Performed by: NURSE PRACTITIONER

## 2021-10-27 RX ORDER — AMLODIPINE BESYLATE 2.5 MG/1
2.5 TABLET ORAL DAILY
Qty: 90 TABLET | Refills: 1 | Status: SHIPPED | OUTPATIENT
Start: 2021-10-27 | End: 2021-11-01 | Stop reason: SDUPTHER

## 2021-10-27 RX ORDER — SIMVASTATIN 10 MG
10 TABLET ORAL NIGHTLY
Qty: 90 TABLET | Refills: 1 | Status: SHIPPED | OUTPATIENT
Start: 2021-10-27 | End: 2021-11-01

## 2021-10-27 RX ORDER — LOSARTAN POTASSIUM 50 MG/1
50 TABLET ORAL NIGHTLY
Qty: 90 TABLET | Refills: 1 | Status: SHIPPED | OUTPATIENT
Start: 2021-10-27 | End: 2022-05-10

## 2021-10-27 RX ORDER — PROPRANOLOL HYDROCHLORIDE 20 MG/1
20 TABLET ORAL 2 TIMES DAILY
Qty: 180 TABLET | Refills: 1 | Status: SHIPPED | OUTPATIENT
Start: 2021-10-27 | End: 2021-11-01 | Stop reason: SDUPTHER

## 2021-10-27 RX ORDER — MELATONIN
1000 DAILY
Qty: 90 TABLET | Refills: 1 | Status: SHIPPED | OUTPATIENT
Start: 2021-10-27 | End: 2022-05-25 | Stop reason: SDUPTHER

## 2021-10-27 RX ORDER — MONTELUKAST SODIUM 10 MG/1
10 TABLET ORAL NIGHTLY
Qty: 90 TABLET | Refills: 1 | Status: SHIPPED | OUTPATIENT
Start: 2021-10-27 | End: 2022-05-25 | Stop reason: SDUPTHER

## 2021-10-27 NOTE — PROGRESS NOTES
The ABCs of the Annual Wellness Visit  Subsequent Medicare Wellness Visit    Chief Complaint   Patient presents with   • Annual Exam      Subjective    History of Present Illness:  Yeimy Yang is a 76 y.o. female who presents for a Subsequent Medicare Wellness Visit.    The following portions of the patient's history were reviewed and   updated as appropriate: allergies, current medications, past family history, past medical history, past social history, past surgical history and problem list.    Compared to one year ago, the patient feels her physical   health is the same.    Compared to one year ago, the patient feels her mental   health is the same.    Recent Hospitalizations:  She was not admitted to the hospital during the last year.       Current Medical Providers:  Patient Care Team:  Anna Caldwell APRN as PCP - General (Nurse Practitioner)    Outpatient Medications Prior to Visit   Medication Sig Dispense Refill   • ascorbic acid (VITAMIN C) 500 MG tablet Take 500 mg by mouth Daily.     • cetirizine (zyrTEC) 10 MG tablet Take 10 mg by mouth Daily.     • Garlic 1000 MG capsule Take  by mouth.     • HYDROcodone-acetaminophen (Norco) 7.5-325 MG per tablet Take 1 tablet by mouth Every 4 (Four) Hours As Needed for Moderate Pain . 42 tablet 0   • multivitamin with minerals tablet tablet Take 1 tablet by mouth Daily.     • Vitamin B Complex-C capsule Take  by mouth.     • Zinc 50 MG capsule Take  by mouth.     • amLODIPine (NORVASC) 2.5 MG tablet Take 2.5 mg by mouth 2 (two) times a day.     • apixaban (ELIQUIS) 2.5 MG tablet tablet Take 1 tablet by mouth Every 12 (Twelve) Hours. Once Eliquis is completed, she may restart her baby aspirin daily 20 tablet 0   • brompheniramine-pseudoephedrine-DM 30-2-10 MG/5ML syrup Take 5 mL by mouth 4 (Four) Times a Day As Needed for Allergies. 200 mL 0   • cholecalciferol (VITAMIN D3) 25 MCG (1000 UT) tablet Take 1,000 Units by mouth Daily.     • losartan  (COZAAR) 50 MG tablet Take 50 mg by mouth Every Night.     • montelukast (SINGULAIR) 10 MG tablet Take 10 mg by mouth Every Night.     • phenazopyridine (PYRIDIUM) 100 MG tablet Take 1 tablet by mouth 3 (Three) Times a Day As Needed for Bladder Spasms. 10 tablet 0   • propranolol (INDERAL) 20 MG tablet Take 20 mg by mouth 2 (Two) Times a Day.     • sertraline (ZOLOFT) 50 MG tablet Take 50 mg by mouth Every Morning.     • simvastatin (ZOCOR) 10 MG tablet Take 10 mg by mouth Every Night.       No facility-administered medications prior to visit.       Opioid medication/s are on active medication list.  and I have evaluated her active treatment plan and pain score trends (see table).  There were no vitals filed for this visit.  I have reviewed the chart for potential of high risk medication and harmful drug interactions in the elderly.            Aspirin is not on active medication list.  Aspirin use is indicated based on review of current medical condition/s. Pros and cons of this therapy have been discussed with this patient. Benefits of this medication outweigh potential harm.  Patient has been instructed to start taking this medication..  She takes ASA daily.    Patient Active Problem List   Diagnosis   • Knee osteoarthritis   • Arthritis   • Allergic rhinitis due to allergen   • Carpal tunnel syndrome   • Essential hypertension   • Fatigue   • Greater trochanteric bursitis   • Hyperlipidemia   • Arthritis   • Osteoarthritis of knee   • Left knee pain   • Aftercare following left knee joint replacement surgery     Advance Care Planning  Advance Directive is on file.  ACP discussion was declined by the patient. Patient has an advance directive in EMR which is still valid.     Review of Systems   Constitutional: Negative for fatigue.   Gastrointestinal: Negative for constipation, nausea and vomiting.        Objective    Vitals:    10/27/21 1307   BP: 125/65   Pulse: 78   Resp: 16   Temp: 97.5 °F (36.4 °C)   SpO2: 98%  "  Weight: 78.9 kg (174 lb)   Height: 154.9 cm (61\")     BMI Readings from Last 1 Encounters:   10/27/21 32.88 kg/m²   BMI is above normal parameters. Recommendations include: exercise counseling and nutrition counseling    Does the patient have evidence of cognitive impairment? No    Physical Exam  Vitals reviewed.   Constitutional:       Appearance: Normal appearance. She is well-developed.   Cardiovascular:      Rate and Rhythm: Normal rate and regular rhythm.      Heart sounds: Normal heart sounds. No murmur heard.      Pulmonary:      Effort: Pulmonary effort is normal.      Breath sounds: Normal breath sounds.   Musculoskeletal:      Right lower leg: No edema.      Left lower leg: No edema.   Neurological:      Mental Status: She is alert and oriented to person, place, and time.      Cranial Nerves: No cranial nerve deficit.      Motor: No weakness.   Psychiatric:         Mood and Affect: Mood and affect normal.                 HEALTH RISK ASSESSMENT    Smoking Status:  Social History     Tobacco Use   Smoking Status Never Smoker   Smokeless Tobacco Never Used     Alcohol Consumption:  Social History     Substance and Sexual Activity   Alcohol Use Never     Fall Risk Screen:    STEADI Fall Risk Assessment was completed, and patient is at LOW risk for falls.Assessment completed on:10/27/2021    Depression Screening:  PHQ-2/PHQ-9 Depression Screening 10/27/2021   Little interest or pleasure in doing things 1   Feeling down, depressed, or hopeless 0   Total Score 1       Health Habits and Functional and Cognitive Screening:  Functional & Cognitive Status 10/27/2021   Do you have difficulty preparing food and eating? No   Do you have difficulty bathing yourself, getting dressed or grooming yourself? No   Do you have difficulty using the toilet? No   Do you have difficulty moving around from place to place? No   Do you have trouble with steps or getting out of a bed or a chair? No   Current Diet Well Balanced Diet "   Dental Exam Not up to date   Eye Exam Up to date   Exercise (times per week) 3 times per week   Current Exercises Include Walking;Stair Step Machine   Do you need help using the phone?  No   Are you deaf or do you have serious difficulty hearing?  No   Do you need help with transportation? No   Do you need help shopping? No   Do you need help preparing meals?  No   Do you need help with housework?  No   Do you need help with laundry? No   Do you need help taking your medications? No   Do you need help managing money? No   Do you ever drive or ride in a car without wearing a seat belt? No   Have you felt unusual stress, anger or loneliness in the last month? Yes   Who do you live with? Spouse   If you need help, do you have trouble finding someone available to you? No   Have you been bothered in the last four weeks by sexual problems? No   Do you have difficulty concentrating, remembering or making decisions? No       Age-appropriate Screening Schedule:  Refer to the list below for future screening recommendations based on patient's age, sex and/or medical conditions. Orders for these recommended tests are listed in the plan section. The patient has been provided with a written plan.    Health Maintenance   Topic Date Due   • ZOSTER VACCINE (3 of 3) 09/16/2019   • DXA SCAN  07/18/2021   • LIPID PANEL  05/11/2022   • TDAP/TD VACCINES (4 - Td or Tdap) 07/09/2029   • INFLUENZA VACCINE  Completed              Assessment/Plan   CMS Preventative Services Quick Reference  Risk Factors Identified During Encounter  Cardiovascular Disease  Depression/Dysphoria  Fall Risk-High or Moderate  Hearing Problem  Immunizations Discussed/Encouraged (specific Immunizations; Influenza, Pneumococcal 23 and Shingrix  Polypharmacy  The above risks/problems have been discussed with the patient.  Follow up actions/plans if indicated are seen below in the Assessment/Plan Section.  Pertinent information has been shared with the patient in the  After Visit Summary.    Diagnoses and all orders for this visit:    1. Annual physical exam (Primary)    2. Need for pneumococcal vaccine  -     pneumococcal polysaccharide 23-valent (PNEUMOVAX-23) vaccine 0.5 mL    3. Essential hypertension  -     Comprehensive Metabolic Panel; Future  -     CBC & Differential; Future  -     TSH; Future  -     Lipid Panel; Future  -     propranolol (INDERAL) 20 MG tablet; Take 1 tablet by mouth 2 (Two) Times a Day.  Dispense: 180 tablet; Refill: 1  -     losartan (COZAAR) 50 MG tablet; Take 1 tablet by mouth Every Night.  Dispense: 90 tablet; Refill: 1  -     amLODIPine (NORVASC) 2.5 MG tablet; Take 1 tablet by mouth Daily.  Dispense: 90 tablet; Refill: 1    4. Mixed hyperlipidemia  -     Comprehensive Metabolic Panel; Future  -     CBC & Differential; Future  -     TSH; Future  -     Lipid Panel; Future  -     simvastatin (ZOCOR) 10 MG tablet; Take 1 tablet by mouth Every Night.  Dispense: 90 tablet; Refill: 1    5. Arthritis    6. Allergic rhinitis due to other allergic trigger, unspecified seasonality  -     montelukast (SINGULAIR) 10 MG tablet; Take 1 tablet by mouth Every Night.  Dispense: 90 tablet; Refill: 1    7. MICHI (generalized anxiety disorder)  -     sertraline (ZOLOFT) 50 MG tablet; Take 1 tablet by mouth Every Morning.  Dispense: 90 tablet; Refill: 1  -     propranolol (INDERAL) 20 MG tablet; Take 1 tablet by mouth 2 (Two) Times a Day.  Dispense: 180 tablet; Refill: 1    8. Vitamin D deficiency  -     cholecalciferol (VITAMIN D3) 25 MCG (1000 UT) tablet; Take 1 tablet by mouth Daily.  Dispense: 90 tablet; Refill: 1        Follow Up:   Return in about 6 months (around 4/27/2022) for Medicare Wellness (10/27/21).     An After Visit Summary and PPPS were made available to the patient.               Chief Complaint  Annual Exam    Subjective    History of Present Illness    Yeimy Yang presents for Annual Wellness Visit as well as for follow-up on chronic medical  "problems including hypertension, hyperlipidemia, allergic rhinitis and arthritis.     Past Medical History:   Diagnosis Date   • Allergic rhinitis due to allergen 08/09/2018   • Arthritis 05/05/2021   • Essential hypertension 08/09/2018   • Fatigue 11/02/2020   • Hyperlipemia    • Plantar fasciitis of right foot    • Primary osteoarthritis of left knee 08/01/2017   • Seasonal allergies    • Shoulder pain, right    • Stroke (HCC)    • Tear of medial meniscus of left knee, current, initial encounter 08/01/2017   • Trochanteric bursitis 08/24/2015     Past Surgical History:   Procedure Laterality Date   • CATARACT EXTRACTION, BILATERAL     • COLONOSCOPY  10/30/2015   • COLONOSCOPY N/A 6/16/2021    Procedure: COLONOSCOPY with cold snare;  Surgeon: Rosendo Dawn MD;  Location: [facility] ENDOSCOPY;  Service: General;  Laterality: N/A;  colon polyp  internal hemorrhoids   • HYSTERECTOMY     • KNEE ARTHROSCOPY Left    • THYROIDECTOMY      NODULE REMOVED ? SIDE   • TOTAL KNEE ARTHROPLASTY Left 7/26/2021    Procedure: LEFT TOTAL KNEE ARTHROPLASTY;  Surgeon: Issac Pierce MD;  Location: [facility] MAIN OR;  Service: Orthopedics;  Laterality: Left;     Family History   Problem Relation Age of Onset   • Heart disease Mother    • Arthritis Mother    • Heart disease Father    • Cancer Brother    • Lung cancer Other    • Malig Hyperthermia Neg Hx      Social History     Tobacco Use   • Smoking status: Never Smoker   • Smokeless tobacco: Never Used   Substance Use Topics   • Alcohol use: Never     Vitals:    10/27/21 1307   BP: 125/65   Pulse: 78   Resp: 16   Temp: 97.5 °F (36.4 °C)   SpO2: 98%   Weight: 78.9 kg (174 lb)   Height: 154.9 cm (61\")     Body mass index is 32.88 kg/m².    Result Review :     Common labs    Common Labsle 5/11/21 7/13/21 7/13/21 7/13/21 7/27/21 7/27/21     1202 1202 1202 0441 0441   Glucose   96   103 (A)   Glucose 104 (A)        BUN 17  16   11   Creatinine 0.64  0.71   0.66   eGFR Non  Am   80   87 "   Sodium 141  140   135 (A)   Potassium 4.4  4.3   3.7   Chloride 104  105   101   Calcium 9.7  9.6   8.8   Albumin 4.6  4.50      Total Bilirubin 0.41  0.3      Alkaline Phosphatase 95  100      AST (SGOT) 19  15      ALT (SGPT) 18  15      WBC 8.75 11.36 (A)   10.82 (A)    Hemoglobin 14.0 13.7   11.7 (A)    Hematocrit 42.6 40.5   34.9    Platelets 264 258   205    Total Cholesterol 205 (A)        Triglycerides 113        HDL Cholesterol 60        LDL Cholesterol  122 (A)        Hemoglobin A1C    5.31     (A) Abnormal value       Comments are available for some flowsheets but are not being displayed.                  Follow-up in 6 months for labs and appt. Call with any concerns or questions that you may have regarding your medications or history.    I have reviewed all medications and at this time no medications changes need to be adjusted for all chronic conditions.    She is heading to [place] until April.    SERAFIN Gross

## 2021-10-29 ENCOUNTER — LAB (OUTPATIENT)
Dept: LAB | Facility: HOSPITAL | Age: 76
End: 2021-10-29

## 2021-10-29 DIAGNOSIS — I10 ESSENTIAL HYPERTENSION: ICD-10-CM

## 2021-10-29 DIAGNOSIS — E78.2 MIXED HYPERLIPIDEMIA: ICD-10-CM

## 2021-10-29 LAB
ALBUMIN SERPL-MCNC: 4.9 G/DL (ref 3.5–5.2)
ALBUMIN/GLOB SERPL: 1.8 G/DL
ALP SERPL-CCNC: 94 U/L (ref 39–117)
ALT SERPL W P-5'-P-CCNC: 20 U/L (ref 1–33)
ANION GAP SERPL CALCULATED.3IONS-SCNC: 10.3 MMOL/L (ref 5–15)
AST SERPL-CCNC: 20 U/L (ref 1–32)
BASOPHILS # BLD AUTO: 0.06 10*3/MM3 (ref 0–0.2)
BASOPHILS NFR BLD AUTO: 0.6 % (ref 0–1.5)
BILIRUB SERPL-MCNC: 0.4 MG/DL (ref 0–1.2)
BUN SERPL-MCNC: 15 MG/DL (ref 8–23)
BUN/CREAT SERPL: 21.4 (ref 7–25)
CALCIUM SPEC-SCNC: 9.9 MG/DL (ref 8.6–10.5)
CHLORIDE SERPL-SCNC: 102 MMOL/L (ref 98–107)
CHOLEST SERPL-MCNC: 216 MG/DL (ref 0–200)
CO2 SERPL-SCNC: 26.7 MMOL/L (ref 22–29)
CREAT SERPL-MCNC: 0.7 MG/DL (ref 0.57–1)
DEPRECATED RDW RBC AUTO: 42.9 FL (ref 37–54)
EOSINOPHIL # BLD AUTO: 0.28 10*3/MM3 (ref 0–0.4)
EOSINOPHIL NFR BLD AUTO: 2.8 % (ref 0.3–6.2)
ERYTHROCYTE [DISTWIDTH] IN BLOOD BY AUTOMATED COUNT: 13.1 % (ref 12.3–15.4)
GFR SERPL CREATININE-BSD FRML MDRD: 81 ML/MIN/1.73
GLOBULIN UR ELPH-MCNC: 2.8 GM/DL
GLUCOSE SERPL-MCNC: 98 MG/DL (ref 65–99)
HCT VFR BLD AUTO: 41.9 % (ref 34–46.6)
HDLC SERPL-MCNC: 53 MG/DL (ref 40–60)
HGB BLD-MCNC: 13.8 G/DL (ref 12–15.9)
IMM GRANULOCYTES # BLD AUTO: 0.05 10*3/MM3 (ref 0–0.05)
IMM GRANULOCYTES NFR BLD AUTO: 0.5 % (ref 0–0.5)
LDLC SERPL CALC-MCNC: 133 MG/DL (ref 0–100)
LDLC/HDLC SERPL: 2.44 {RATIO}
LYMPHOCYTES # BLD AUTO: 2.74 10*3/MM3 (ref 0.7–3.1)
LYMPHOCYTES NFR BLD AUTO: 27.3 % (ref 19.6–45.3)
MCH RBC QN AUTO: 29.4 PG (ref 26.6–33)
MCHC RBC AUTO-ENTMCNC: 32.9 G/DL (ref 31.5–35.7)
MCV RBC AUTO: 89.3 FL (ref 79–97)
MONOCYTES # BLD AUTO: 0.74 10*3/MM3 (ref 0.1–0.9)
MONOCYTES NFR BLD AUTO: 7.4 % (ref 5–12)
NEUTROPHILS NFR BLD AUTO: 6.18 10*3/MM3 (ref 1.7–7)
NEUTROPHILS NFR BLD AUTO: 61.4 % (ref 42.7–76)
NRBC BLD AUTO-RTO: 0 /100 WBC (ref 0–0.2)
PLATELET # BLD AUTO: 279 10*3/MM3 (ref 140–450)
PMV BLD AUTO: 10.8 FL (ref 6–12)
POTASSIUM SERPL-SCNC: 4.5 MMOL/L (ref 3.5–5.2)
PROT SERPL-MCNC: 7.7 G/DL (ref 6–8.5)
RBC # BLD AUTO: 4.69 10*6/MM3 (ref 3.77–5.28)
SODIUM SERPL-SCNC: 139 MMOL/L (ref 136–145)
TRIGL SERPL-MCNC: 168 MG/DL (ref 0–150)
TSH SERPL DL<=0.05 MIU/L-ACNC: 2.66 UIU/ML (ref 0.27–4.2)
VLDLC SERPL-MCNC: 30 MG/DL (ref 5–40)
WBC # BLD AUTO: 10.05 10*3/MM3 (ref 3.4–10.8)

## 2021-10-29 PROCEDURE — 80061 LIPID PANEL: CPT

## 2021-10-29 PROCEDURE — 80053 COMPREHEN METABOLIC PANEL: CPT

## 2021-10-29 PROCEDURE — 84443 ASSAY THYROID STIM HORMONE: CPT

## 2021-10-29 PROCEDURE — 36415 COLL VENOUS BLD VENIPUNCTURE: CPT

## 2021-10-29 PROCEDURE — 85025 COMPLETE CBC W/AUTO DIFF WBC: CPT

## 2021-11-01 DIAGNOSIS — I10 ESSENTIAL HYPERTENSION: Primary | ICD-10-CM

## 2021-11-01 DIAGNOSIS — F41.1 GAD (GENERALIZED ANXIETY DISORDER): ICD-10-CM

## 2021-11-01 RX ORDER — PROPRANOLOL HYDROCHLORIDE 20 MG/1
20 TABLET ORAL 2 TIMES DAILY
Qty: 60 TABLET | Refills: 0 | Status: SHIPPED | OUTPATIENT
Start: 2021-11-01 | End: 2022-05-25 | Stop reason: SDUPTHER

## 2021-11-01 RX ORDER — AMLODIPINE BESYLATE 2.5 MG/1
2.5 TABLET ORAL DAILY
Qty: 30 TABLET | Refills: 0 | Status: SHIPPED | OUTPATIENT
Start: 2021-11-01 | End: 2022-05-25 | Stop reason: SDUPTHER

## 2021-11-01 RX ORDER — SIMVASTATIN 20 MG
20 TABLET ORAL NIGHTLY
Qty: 90 TABLET | Refills: 1 | Status: SHIPPED | OUTPATIENT
Start: 2021-11-01 | End: 2022-05-25 | Stop reason: SDUPTHER

## 2021-11-01 NOTE — TELEPHONE ENCOUNTER
Patient going to [facility]. Has refills medication there. Rx sent to [facility] for 30 days.

## 2021-12-02 RX ORDER — CETIRIZINE HYDROCHLORIDE 10 MG/1
TABLET, FILM COATED ORAL
Qty: 90 TABLET | Refills: 0 | Status: SHIPPED | OUTPATIENT
Start: 2021-12-02 | End: 2022-03-02

## 2022-03-02 RX ORDER — CETIRIZINE HYDROCHLORIDE 10 MG/1
TABLET ORAL
Qty: 90 TABLET | Refills: 1 | Status: SHIPPED | OUTPATIENT
Start: 2022-03-02 | End: 2022-06-02

## 2022-05-09 DIAGNOSIS — I10 ESSENTIAL HYPERTENSION: ICD-10-CM

## 2022-05-10 RX ORDER — LOSARTAN POTASSIUM 50 MG/1
TABLET ORAL
Qty: 90 TABLET | Refills: 1 | Status: SHIPPED | OUTPATIENT
Start: 2022-05-10 | End: 2022-05-25 | Stop reason: SDUPTHER

## 2022-05-25 ENCOUNTER — OFFICE VISIT (OUTPATIENT)
Dept: FAMILY MEDICINE CLINIC | Facility: CLINIC | Age: 77
End: 2022-05-25

## 2022-05-25 VITALS
HEIGHT: 61 IN | BODY MASS INDEX: 33.04 KG/M2 | RESPIRATION RATE: 18 BRPM | DIASTOLIC BLOOD PRESSURE: 80 MMHG | TEMPERATURE: 97 F | HEART RATE: 73 BPM | OXYGEN SATURATION: 96 % | SYSTOLIC BLOOD PRESSURE: 120 MMHG | WEIGHT: 175 LBS

## 2022-05-25 DIAGNOSIS — Z78.0 POSTMENOPAUSE: ICD-10-CM

## 2022-05-25 DIAGNOSIS — E55.9 VITAMIN D DEFICIENCY: ICD-10-CM

## 2022-05-25 DIAGNOSIS — J30.89 ALLERGIC RHINITIS DUE TO OTHER ALLERGIC TRIGGER, UNSPECIFIED SEASONALITY: ICD-10-CM

## 2022-05-25 DIAGNOSIS — I10 ESSENTIAL HYPERTENSION: Primary | ICD-10-CM

## 2022-05-25 DIAGNOSIS — E78.2 MIXED HYPERLIPIDEMIA: ICD-10-CM

## 2022-05-25 DIAGNOSIS — F41.1 GAD (GENERALIZED ANXIETY DISORDER): ICD-10-CM

## 2022-05-25 DIAGNOSIS — Z11.59 ENCOUNTER FOR HEPATITIS C SCREENING TEST FOR LOW RISK PATIENT: ICD-10-CM

## 2022-05-25 LAB
ALBUMIN SERPL-MCNC: 4.5 G/DL (ref 3.5–5.2)
ALBUMIN/GLOB SERPL: 1.7 G/DL
ALP SERPL-CCNC: 85 U/L (ref 39–117)
ALT SERPL W P-5'-P-CCNC: 18 U/L (ref 1–33)
ANION GAP SERPL CALCULATED.3IONS-SCNC: 15.3 MMOL/L (ref 5–15)
AST SERPL-CCNC: 22 U/L (ref 1–32)
BASOPHILS # BLD AUTO: 0.05 10*3/MM3 (ref 0–0.2)
BASOPHILS NFR BLD AUTO: 0.6 % (ref 0–1.5)
BILIRUB SERPL-MCNC: 0.5 MG/DL (ref 0–1.2)
BUN SERPL-MCNC: 18 MG/DL (ref 8–23)
BUN/CREAT SERPL: 20.7 (ref 7–25)
CALCIUM SPEC-SCNC: 10.3 MG/DL (ref 8.6–10.5)
CHLORIDE SERPL-SCNC: 104 MMOL/L (ref 98–107)
CHOLEST SERPL-MCNC: 176 MG/DL (ref 0–200)
CO2 SERPL-SCNC: 23.7 MMOL/L (ref 22–29)
CREAT SERPL-MCNC: 0.87 MG/DL (ref 0.57–1)
DEPRECATED RDW RBC AUTO: 43.8 FL (ref 37–54)
EGFRCR SERPLBLD CKD-EPI 2021: 68.7 ML/MIN/1.73
EOSINOPHIL # BLD AUTO: 0.25 10*3/MM3 (ref 0–0.4)
EOSINOPHIL NFR BLD AUTO: 3 % (ref 0.3–6.2)
ERYTHROCYTE [DISTWIDTH] IN BLOOD BY AUTOMATED COUNT: 13.3 % (ref 12.3–15.4)
GLOBULIN UR ELPH-MCNC: 2.7 GM/DL
GLUCOSE SERPL-MCNC: 103 MG/DL (ref 65–99)
HCT VFR BLD AUTO: 42.3 % (ref 34–46.6)
HCV AB SER DONR QL: NORMAL
HDLC SERPL-MCNC: 53 MG/DL (ref 40–60)
HGB BLD-MCNC: 13.9 G/DL (ref 12–15.9)
IMM GRANULOCYTES # BLD AUTO: 0.04 10*3/MM3 (ref 0–0.05)
IMM GRANULOCYTES NFR BLD AUTO: 0.5 % (ref 0–0.5)
LDLC SERPL CALC-MCNC: 103 MG/DL (ref 0–100)
LDLC/HDLC SERPL: 1.91 {RATIO}
LYMPHOCYTES # BLD AUTO: 2.41 10*3/MM3 (ref 0.7–3.1)
LYMPHOCYTES NFR BLD AUTO: 28.9 % (ref 19.6–45.3)
MCH RBC QN AUTO: 29.3 PG (ref 26.6–33)
MCHC RBC AUTO-ENTMCNC: 32.9 G/DL (ref 31.5–35.7)
MCV RBC AUTO: 89.1 FL (ref 79–97)
MONOCYTES # BLD AUTO: 0.63 10*3/MM3 (ref 0.1–0.9)
MONOCYTES NFR BLD AUTO: 7.6 % (ref 5–12)
NEUTROPHILS NFR BLD AUTO: 4.96 10*3/MM3 (ref 1.7–7)
NEUTROPHILS NFR BLD AUTO: 59.4 % (ref 42.7–76)
NRBC BLD AUTO-RTO: 0 /100 WBC (ref 0–0.2)
PLATELET # BLD AUTO: 258 10*3/MM3 (ref 140–450)
PMV BLD AUTO: 10.8 FL (ref 6–12)
POTASSIUM SERPL-SCNC: 5.1 MMOL/L (ref 3.5–5.2)
PROT SERPL-MCNC: 7.2 G/DL (ref 6–8.5)
RBC # BLD AUTO: 4.75 10*6/MM3 (ref 3.77–5.28)
SODIUM SERPL-SCNC: 143 MMOL/L (ref 136–145)
TRIGL SERPL-MCNC: 109 MG/DL (ref 0–150)
TSH SERPL DL<=0.05 MIU/L-ACNC: 2.57 UIU/ML (ref 0.27–4.2)
VLDLC SERPL-MCNC: 20 MG/DL (ref 5–40)
WBC NRBC COR # BLD: 8.34 10*3/MM3 (ref 3.4–10.8)

## 2022-05-25 PROCEDURE — 84443 ASSAY THYROID STIM HORMONE: CPT | Performed by: NURSE PRACTITIONER

## 2022-05-25 PROCEDURE — 36415 COLL VENOUS BLD VENIPUNCTURE: CPT | Performed by: NURSE PRACTITIONER

## 2022-05-25 PROCEDURE — 86803 HEPATITIS C AB TEST: CPT | Performed by: NURSE PRACTITIONER

## 2022-05-25 PROCEDURE — 99214 OFFICE O/P EST MOD 30 MIN: CPT | Performed by: NURSE PRACTITIONER

## 2022-05-25 PROCEDURE — 80061 LIPID PANEL: CPT | Performed by: NURSE PRACTITIONER

## 2022-05-25 PROCEDURE — 85025 COMPLETE CBC W/AUTO DIFF WBC: CPT | Performed by: NURSE PRACTITIONER

## 2022-05-25 PROCEDURE — 80053 COMPREHEN METABOLIC PANEL: CPT | Performed by: NURSE PRACTITIONER

## 2022-05-25 RX ORDER — LOSARTAN POTASSIUM 50 MG/1
50 TABLET ORAL DAILY
Qty: 90 TABLET | Refills: 1 | Status: SHIPPED | OUTPATIENT
Start: 2022-05-25 | End: 2022-09-26 | Stop reason: SDUPTHER

## 2022-05-25 RX ORDER — SIMVASTATIN 20 MG
20 TABLET ORAL NIGHTLY
Qty: 90 TABLET | Refills: 1 | Status: SHIPPED | OUTPATIENT
Start: 2022-05-25 | End: 2022-09-26 | Stop reason: SDUPTHER

## 2022-05-25 RX ORDER — MELATONIN
1000 DAILY
Qty: 90 TABLET | Refills: 1 | Status: SHIPPED | OUTPATIENT
Start: 2022-05-25 | End: 2022-12-08

## 2022-05-25 RX ORDER — MONTELUKAST SODIUM 10 MG/1
10 TABLET ORAL NIGHTLY
Qty: 90 TABLET | Refills: 1 | Status: SHIPPED | OUTPATIENT
Start: 2022-05-25 | End: 2022-09-26 | Stop reason: SDUPTHER

## 2022-05-25 RX ORDER — PROPRANOLOL HYDROCHLORIDE 20 MG/1
20 TABLET ORAL 2 TIMES DAILY
Qty: 180 TABLET | Refills: 1 | Status: SHIPPED | OUTPATIENT
Start: 2022-05-25 | End: 2022-09-26 | Stop reason: SDUPTHER

## 2022-05-25 RX ORDER — CHLORAL HYDRATE 500 MG
CAPSULE ORAL
COMMUNITY

## 2022-05-25 RX ORDER — ASPIRIN 81 MG/1
81 TABLET, CHEWABLE ORAL DAILY
COMMUNITY

## 2022-05-25 RX ORDER — AMLODIPINE BESYLATE 2.5 MG/1
2.5 TABLET ORAL DAILY
Qty: 90 TABLET | Refills: 1 | Status: SHIPPED | OUTPATIENT
Start: 2022-05-25 | End: 2022-09-26 | Stop reason: SDUPTHER

## 2022-05-25 NOTE — PROGRESS NOTES
Chief Complaint  Hypertension and Hyperlipidemia    Subjective          Yeimy Yang presents to [facility]  History of Present Illness   She did just return from [place] and she is will be heading back in October.  HTN: She is doing well with current losartan, propranolol, Norvasc her check BP is running 's/70-80.    LIPIDS: She is doing well with current simvastatin  Allergies:  She is taking her singular.  She is doing good overall.    Anxiety:  She felt fine in [place] on the Zoloft.  She rides bikes and talks to neighbors.  She is doing ok but there is a lot of stress with family.  VIT D: She is taking her vitamin D with no problems.  ALLERGIES: Is taking her Singulair with no issues noted.  Depression: Not at risk   • PHQ-2 Score: 0         Allergies  Patient has no known allergies.    Social History     Tobacco Use   • Smoking status: Never Smoker   • Smokeless tobacco: Never Used   Vaping Use   • Vaping Use: Never used   Substance Use Topics   • Alcohol use: Never   • Drug use: Never       Family History   Problem Relation Age of Onset   • Heart disease Mother    • Arthritis Mother    • Heart disease Father    • Cancer Brother    • Lung cancer Other    • Malig Hyperthermia Neg Hx         Health Maintenance Due   Topic Date Due   • ZOSTER VACCINE (3 of 3) 09/16/2019   • HEPATITIS C SCREENING  Never done   • DXA SCAN  07/18/2021   • COVID-19 Vaccine (4 - Booster for Moderna series) 03/21/2022        Immunization History   Administered Date(s) Administered   • COVID-19 (MODERNA) 1st, 2nd, 3rd Dose Only 04/13/2021, 05/11/2021   • COVID-19 (MODERNA) BOOSTER 11/21/2021   • Fluzone High-Dose 65+yrs 10/08/2021   • Influenza, Unspecified 10/09/2020   • Pneumococcal Conjugate 13-Valent (PCV13) 01/27/2016   • Pneumococcal Polysaccharide (PPSV23) 10/27/2021   • Shingrix 05/13/2019, 07/18/2019   • TD Preservative Free 07/09/2019   • Td 12/16/1996   • Tdap 06/13/2012   •  "Zostavax 10/17/2013   • Zoster, Unspecified 05/13/2019, 07/22/2019       Review of Systems   Constitutional: Negative for fatigue.   Respiratory: Negative for cough and shortness of breath.    Cardiovascular: Negative for chest pain.   Gastrointestinal: Negative for diarrhea, nausea and vomiting.        Objective       Vitals:    05/25/22 1023   BP: 120/80   Pulse: 73   Resp: 18   Temp: 97 °F (36.1 °C)   SpO2: 96%   Weight: 79.4 kg (175 lb)   Height: 154.9 cm (61\")       Body mass index is 33.07 kg/m².         Physical Exam  Vitals reviewed.   Constitutional:       Appearance: Normal appearance. She is well-developed.   Cardiovascular:      Rate and Rhythm: Normal rate and regular rhythm.      Heart sounds: Normal heart sounds. No murmur heard.  Pulmonary:      Effort: Pulmonary effort is normal.      Breath sounds: Normal breath sounds.   Neurological:      Mental Status: She is alert and oriented to person, place, and time.      Cranial Nerves: No cranial nerve deficit.      Motor: No weakness.   Psychiatric:         Mood and Affect: Mood and affect normal.             Result Review :     The following data was reviewed by: SERAFIN Gross on 05/25/2022:    Common labs    Common Labsle 7/13/21 7/13/21 7/13/21 7/27/21 7/27/21 10/29/21 10/29/21 10/29/21    1202 1202 1202 0441 0441 1051 1051 1051   Glucose  96   103 (A)  98    BUN  16   11  15    Creatinine  0.71   0.66  0.70    eGFR Non  Am  80   87  81    Sodium  140   135 (A)  139    Potassium  4.3   3.7  4.5    Chloride  105   101  102    Calcium  9.6   8.8  9.9    Albumin  4.50     4.90    Total Bilirubin  0.3     0.4    Alkaline Phosphatase  100     94    AST (SGOT)  15     20    ALT (SGPT)  15     20    WBC 11.36 (A)   10.82 (A)  10.05     Hemoglobin 13.7   11.7 (A)  13.8     Hematocrit 40.5   34.9  41.9     Platelets 258   205  279     Total Cholesterol        216 (A)   Triglycerides        168 (A)   HDL Cholesterol        53   LDL " Cholesterol         133 (A)   Hemoglobin A1C   5.31        (A) Abnormal value            Most Recent A1C    HGBA1C Most Recent 7/13/21   Hemoglobin A1C 5.31                          Assessment and Plan      Diagnoses and all orders for this visit:    1. Essential hypertension (Primary)  -     Comprehensive Metabolic Panel  -     CBC & Differential  -     TSH  -     Lipid Panel  -     amLODIPine (NORVASC) 2.5 MG tablet; Take 1 tablet by mouth Daily.  Dispense: 90 tablet; Refill: 1  -     losartan (COZAAR) 50 MG tablet; Take 1 tablet by mouth Daily.  Dispense: 90 tablet; Refill: 1  -     propranolol (INDERAL) 20 MG tablet; Take 1 tablet by mouth 2 (Two) Times a Day.  Dispense: 180 tablet; Refill: 1    2. Postmenopause  -     DEXA Bone Density Axial    3. Encounter for hepatitis C screening test for low risk patient  -     Hepatitis C antibody    4. Vitamin D deficiency  -     cholecalciferol (VITAMIN D3) 25 MCG (1000 UT) tablet; Take 1 tablet by mouth Daily.  Dispense: 90 tablet; Refill: 1    5. Allergic rhinitis due to other allergic trigger, unspecified seasonality  -     montelukast (SINGULAIR) 10 MG tablet; Take 1 tablet by mouth Every Night.  Dispense: 90 tablet; Refill: 1    6. MICHI (generalized anxiety disorder)  -     propranolol (INDERAL) 20 MG tablet; Take 1 tablet by mouth 2 (Two) Times a Day.  Dispense: 180 tablet; Refill: 1  -     sertraline (ZOLOFT) 50 MG tablet; Take 1 tablet by mouth Every Morning.  Dispense: 90 tablet; Refill: 1    7. Mixed hyperlipidemia  -     simvastatin (Zocor) 20 MG tablet; Take 1 tablet by mouth Every Night.  Dispense: 90 tablet; Refill: 1            Follow Up     Return in about 6 months (around 11/25/2022) for Medicare Euwvpdor80/27/2021.  Follow-up in 6 months for labs and appt. Call with any concerns or questions that you may have regarding your medications or history.    I have reviewed all medications and at this time no medications changes need to be adjusted for all  chronic conditions.    Patient was given instructions and counseling regarding her condition or for health maintenance advice. Please see specific information pulled into the AVS if appropriate.         Anna Caldwell, APRN  05/25/2022

## 2022-06-02 RX ORDER — CETIRIZINE HYDROCHLORIDE 10 MG/1
TABLET ORAL
Qty: 90 TABLET | Refills: 1 | Status: SHIPPED | OUTPATIENT
Start: 2022-06-02 | End: 2022-09-26 | Stop reason: SDUPTHER

## 2022-07-21 ENCOUNTER — OFFICE VISIT (OUTPATIENT)
Dept: ORTHOPEDIC SURGERY | Facility: CLINIC | Age: 77
End: 2022-07-21

## 2022-07-21 VITALS — WEIGHT: 176 LBS | HEART RATE: 76 BPM | BODY MASS INDEX: 32.39 KG/M2 | HEIGHT: 62 IN | OXYGEN SATURATION: 98 %

## 2022-07-21 DIAGNOSIS — Z96.652 AFTERCARE FOLLOWING LEFT KNEE JOINT REPLACEMENT SURGERY: ICD-10-CM

## 2022-07-21 DIAGNOSIS — M25.562 LEFT KNEE PAIN, UNSPECIFIED CHRONICITY: Primary | ICD-10-CM

## 2022-07-21 DIAGNOSIS — Z47.1 AFTERCARE FOLLOWING LEFT KNEE JOINT REPLACEMENT SURGERY: ICD-10-CM

## 2022-07-21 PROCEDURE — 99213 OFFICE O/P EST LOW 20 MIN: CPT | Performed by: PHYSICIAN ASSISTANT

## 2022-07-21 NOTE — PATIENT INSTRUCTIONS
Patient advised to return to PT to progress strengthening and work on ROM.    Follow up in 3 months. No x-rays needed.

## 2022-07-21 NOTE — PROGRESS NOTES
"Chief Complaint  Pain and Follow-up of the Left Knee    Subjective          Yeimy Yang presents to [facility] for s/p left total knee arthroplasty performed by Dr. Pierce on 07/26/2021.  Patient states she returned to [place] after being there for the wintertime.  She states she has had some pain to the left knee.  She states pain is mostly to the lateral aspect.  She denies any known trauma or injury.  She states her knee does not bend as much as she would like for it to.  She has not been in formal physical therapy since leaving for [place] last November 1.  She is here today fully weightbearing.  She has no other additional complaints.    Objective   No Known Allergies    Vital Signs:   Pulse 76   Ht 156.2 cm (61.5\")   Wt 79.8 kg (176 lb)   SpO2 98%   BMI 32.72 kg/m²       Physical Exam    Constitutional: Awake, alert   Integumentary: Warm, dry, intact   HENT: Atraumatic, normocephalic    Respiratory: Non labored respirations    Cardiovascular: Intact peripheral pulses    Psychiatric: Normal mood and affect. A&O x 3    Ortho Exam  Left knee: Well-healed surgical incision.  Skin dry and intact.  No swelling or discoloration.  Full knee extension, flexion to 112 degrees.  Stable to varus/valgus stress.  The patella is well tracking.  Full plantar flexion dorsiflexion of the ankle.  Fully weightbearing, slow sit to stand transition.  Gait nonantalgic.  Sensation is intact.  Neurovascular intact distally.    Result Review :       Imaging Results (Most Recent)     Procedure Component Value Units Date/Time    XR Knee 3 View Left [[id number]] Resulted: 07/21/22 0940     Updated: 07/21/22 0942    Narrative:      X-Ray Report:  Study: X-rays ordered, taken in the office, and reviewed today  Site: left knee Xray  Indication: Left TKA  View: AP, Lateral and Sunrise view(s)  Findings: Intact left total knee arthroplasty without evidence of hardware   failure or loosening.   Prior " studies available for comparison: yes                   Assessment and Plan    Problem List Items Addressed This Visit        Musculoskeletal and Injuries    Left knee pain - Primary    Relevant Orders    XR Knee 3 View Left (Completed)    Aftercare following left knee joint replacement surgery    Relevant Orders    Ambulatory Referral to Physical Therapy Evaluate and treat; Stretching, ROM, Strengthening (Completed)          Follow Up   Return in about 3 months (around 10/21/2022).  Patient Instructions   Patient advised to return to PT to progress strengthening and work on ROM.    Follow up in 3 months. No x-rays needed.     Patient was given instructions and counseling regarding her condition or for health maintenance advice. Please see specific information pulled into the AVS if appropriate.

## 2022-08-04 ENCOUNTER — OFFICE VISIT (OUTPATIENT)
Dept: FAMILY MEDICINE CLINIC | Facility: CLINIC | Age: 77
End: 2022-08-04

## 2022-08-04 VITALS
TEMPERATURE: 97 F | HEART RATE: 69 BPM | OXYGEN SATURATION: 98 % | SYSTOLIC BLOOD PRESSURE: 124 MMHG | DIASTOLIC BLOOD PRESSURE: 62 MMHG | RESPIRATION RATE: 18 BRPM

## 2022-08-04 DIAGNOSIS — U07.1 COVID: ICD-10-CM

## 2022-08-04 DIAGNOSIS — R05.9 COUGH: Primary | ICD-10-CM

## 2022-08-04 LAB
EXPIRATION DATE: ABNORMAL
FLUAV AG UPPER RESP QL IA.RAPID: NOT DETECTED
FLUBV AG UPPER RESP QL IA.RAPID: NOT DETECTED
INTERNAL CONTROL: ABNORMAL
Lab: ABNORMAL
SARS-COV-2 AG UPPER RESP QL IA.RAPID: DETECTED

## 2022-08-04 PROCEDURE — 87428 SARSCOV & INF VIR A&B AG IA: CPT | Performed by: NURSE PRACTITIONER

## 2022-08-04 PROCEDURE — 99214 OFFICE O/P EST MOD 30 MIN: CPT | Performed by: NURSE PRACTITIONER

## 2022-08-04 RX ORDER — BROMPHENIRAMINE MALEATE, PSEUDOEPHEDRINE HYDROCHLORIDE, AND DEXTROMETHORPHAN HYDROBROMIDE 2; 30; 10 MG/5ML; MG/5ML; MG/5ML
5 SYRUP ORAL 4 TIMES DAILY PRN
Qty: 240 ML | Refills: 1 | Status: SHIPPED | OUTPATIENT
Start: 2022-08-04 | End: 2022-09-26

## 2022-08-04 RX ORDER — DEXAMETHASONE 6 MG/1
6 TABLET ORAL DAILY
Qty: 6 TABLET | Refills: 0 | Status: SHIPPED | OUTPATIENT
Start: 2022-08-04 | End: 2022-08-15

## 2022-08-04 NOTE — PROGRESS NOTES
Chief Complaint  Sore Throat and Cough    Subjective          Yeimy Yang presents to [facility]  History of Present Illness  Patient he is here for sinus congestion, cough, fever chills sweats, family member which is her  has positive COVID.  She has been coughing up some mucus.  She has had some dizziness but no falls.  She feels like she is got a lot of pressure in her ears.  She has been vaccinated with COVID vaccines.  She has had her booster.  She is worried that she may have COVID.  She goes to sign for her new house tomorrow.  She was sitting across from a lady last weekend that tested positive for COVID.  Her symptoms started Tuesday and Wednesday.  She has been taking NyQuil, Mucinex and Tylenol with ibuprofen every now and then.  That has helped with some of the symptoms.    Allergies  Patient has no known allergies.    Social History     Tobacco Use   • Smoking status: Never Smoker   • Smokeless tobacco: Never Used   Vaping Use   • Vaping Use: Never used   Substance Use Topics   • Alcohol use: Never   • Drug use: Never       Family History   Problem Relation Age of Onset   • Heart disease Mother    • Arthritis Mother    • Heart disease Father    • Cancer Brother    • Lung cancer Other    • Malig Hyperthermia Neg Hx         Health Maintenance Due   Topic Date Due   • DXA SCAN  07/18/2021   • COVID-19 Vaccine (4 - Booster for Moderna series) 03/21/2022        Immunization History   Administered Date(s) Administered   • COVID-19 (MODERNA) 1st, 2nd, 3rd Dose Only 04/13/2021, 05/11/2021   • COVID-19 (MODERNA) BOOSTER 11/21/2021   • Fluzone High-Dose 65+yrs 10/08/2021   • Influenza, Unspecified 10/09/2020   • Pneumococcal Conjugate 13-Valent (PCV13) 01/27/2016   • Pneumococcal Polysaccharide (PPSV23) 10/27/2021   • Shingrix 05/13/2019, 07/18/2019   • TD Preservative Free 07/09/2019   • Td 12/16/1996   • Tdap 06/13/2012   • Zostavax 10/17/2013   • Zoster,  Unspecified 05/13/2019, 07/22/2019       Review of Systems   Constitutional: Positive for chills, fatigue and fever.   HENT: Positive for congestion, ear pain, hearing loss, postnasal drip, rhinorrhea, sinus pressure and sore throat.    Respiratory: Positive for cough. Negative for shortness of breath.    Gastrointestinal: Negative for nausea and vomiting.        Objective       Vitals:    08/04/22 1008   BP: 124/62   Pulse: 69   Resp: 18   Temp: 97 °F (36.1 °C)   SpO2: 98%       There is no height or weight on file to calculate BMI.         Physical Exam  Vitals reviewed.   Constitutional:       Appearance: Normal appearance. She is well-developed.   HENT:      Right Ear: Ear canal normal.      Left Ear: Ear canal normal.      Ears:      Comments: Bilateral TMs with fluid noted with no redness.     Nose: Congestion and rhinorrhea present.      Mouth/Throat:      Pharynx: Posterior oropharyngeal erythema present.   Cardiovascular:      Rate and Rhythm: Normal rate and regular rhythm.      Heart sounds: Normal heart sounds. No murmur heard.  Pulmonary:      Effort: Pulmonary effort is normal.      Breath sounds: Wheezing present. No rhonchi.   Neurological:      Mental Status: She is alert and oriented to person, place, and time.      Cranial Nerves: No cranial nerve deficit.      Motor: No weakness.   Psychiatric:         Mood and Affect: Mood and affect normal.             Result Review :     The following data was reviewed by: SERAFIN Gross on 08/04/2022:          covid positive flu neg           Assessment and Plan      Diagnoses and all orders for this visit:    1. Cough (Primary)  -     POCT SARS-CoV-2 Antigen JULIANE + Flu    2. COVID  -     dexamethasone (DECADRON) 6 MG tablet; Take 1 tablet by mouth Daily.  Dispense: 6 tablet; Refill: 0  -     brompheniramine-pseudoephedrine-DM 30-2-10 MG/5ML syrup; Take 5 mL by mouth 4 (Four) Times a Day As Needed for Congestion.  Dispense: 240 mL; Refill:  1            Follow Up     Return if symptoms worsen or fail to improve.  Discussed with patient that she does need to quarantine for 5 days then retest at home if she is positive still she needs to continue quarantining for another 5 days.  If she is negative she can go out and about with the mask on for an additional 5 days.  Drink plenty of fluids.  Stop the NyQuil and continue the ibuprofen and Tylenol as needed.  Take the cough medicine as needed.  Take the steroid which we have discussed the side effects of.  Patient was given instructions and counseling regarding her condition or for health maintenance advice. Please see specific information pulled into the AVS if appropriate.         Anna Caldwell, APRN  08/04/2022

## 2022-08-05 ENCOUNTER — TELEPHONE (OUTPATIENT)
Dept: FAMILY MEDICINE CLINIC | Facility: CLINIC | Age: 77
End: 2022-08-05

## 2022-08-05 NOTE — TELEPHONE ENCOUNTER
Caller: Yeimy Yang    Relationship: Self    Best call back number: [phone]    What medication are you requesting: UNKNOWN, NEEDED TO TREAT UTI    What are your current symptoms: BURNING, NEED TO PEE CONSTANTLY, PAIN WHEN STANDING    How long have you been experiencing symptoms: SINCE LAST NIGHT, HAS GOTTEN WORSE THIS MORNING    Have you had these symptoms before:    [x] Yes  [] No    Have you been treated for these symptoms before:   [x] Yes  [] No    If a prescription is needed, what is your preferred pharmacy and phone number: [facility] - [place], [place] - [address] - [phone]  - [phone] FX     Additional notes: PATIENT CURRENTLY HAS BEEN DIAGNOSED WITH COVID AND WOULD LIKE TO AVOID COMING IN FOR AN APPOINTMENT, PLEASE CONTACT AND ADVISE IF PRESCRIPTION CAN BE SENT OR NOT

## 2022-08-06 PROCEDURE — 82962 GLUCOSE BLOOD TEST: CPT

## 2022-08-06 PROCEDURE — 87077 CULTURE AEROBIC IDENTIFY: CPT | Performed by: FAMILY MEDICINE

## 2022-08-06 PROCEDURE — 87186 SC STD MICRODIL/AGAR DIL: CPT | Performed by: FAMILY MEDICINE

## 2022-08-06 PROCEDURE — 87086 URINE CULTURE/COLONY COUNT: CPT | Performed by: FAMILY MEDICINE

## 2022-08-08 ENCOUNTER — TELEPHONE (OUTPATIENT)
Dept: URGENT CARE | Facility: CLINIC | Age: 77
End: 2022-08-08

## 2022-08-08 NOTE — TELEPHONE ENCOUNTER
----- Message from Franklyn Burrell MD sent at 8/8/2022  1:54 PM EDT -----  Please call the patient regarding urine culture - it is susceptible to the cefdnir - finish course - if not better by end see primary

## 2022-08-09 ENCOUNTER — TELEPHONE (OUTPATIENT)
Dept: URGENT CARE | Facility: CLINIC | Age: 77
End: 2022-08-09

## 2022-08-09 ENCOUNTER — TELEPHONE (OUTPATIENT)
Dept: FAMILY MEDICINE CLINIC | Facility: CLINIC | Age: 77
End: 2022-08-09

## 2022-08-09 RX ORDER — AZITHROMYCIN 250 MG/1
250 TABLET, FILM COATED ORAL DAILY
Qty: 6 TABLET | Refills: 0 | Status: SHIPPED | OUTPATIENT
Start: 2022-08-09 | End: 2022-08-15

## 2022-08-09 RX ORDER — AZITHROMYCIN 250 MG/1
250 TABLET, FILM COATED ORAL DAILY
COMMUNITY
End: 2022-08-09 | Stop reason: SDUPTHER

## 2022-08-09 NOTE — TELEPHONE ENCOUNTER
Caller: Yeimy Yang    Relationship: Self    Best call back number: [phone]    What is the best time to reach you: ANYTIME    Who are you requesting to speak with (clinical staff, provider,  specific staff member): CLINICAL     What was the call regarding: PATIENT STATES SHE IS STILL SICK AND IS NOT ANY BETTER AND DOES NOT KNOW WHAT TO DO. PATIENT IS ASKING FOR A CALL BACK TO SEE WHAT SHE SHOULD DO GOING FORWARD.    Do you require a callback: YES

## 2022-08-09 NOTE — TELEPHONE ENCOUNTER
Patient has two pills left. One for tonight and one for in the morning. She stated her UTI symptoms are gone.

## 2022-08-15 ENCOUNTER — OFFICE VISIT (OUTPATIENT)
Dept: FAMILY MEDICINE CLINIC | Facility: CLINIC | Age: 77
End: 2022-08-15

## 2022-08-15 VITALS
DIASTOLIC BLOOD PRESSURE: 80 MMHG | WEIGHT: 174.6 LBS | BODY MASS INDEX: 32.13 KG/M2 | HEIGHT: 62 IN | HEART RATE: 90 BPM | RESPIRATION RATE: 20 BRPM | OXYGEN SATURATION: 97 % | SYSTOLIC BLOOD PRESSURE: 120 MMHG | TEMPERATURE: 97.4 F

## 2022-08-15 DIAGNOSIS — R05.9 COUGH: ICD-10-CM

## 2022-08-15 DIAGNOSIS — U07.1 COVID: Primary | ICD-10-CM

## 2022-08-15 PROCEDURE — U0005 INFEC AGEN DETEC AMPLI PROBE: HCPCS | Performed by: NURSE PRACTITIONER

## 2022-08-15 PROCEDURE — U0004 COV-19 TEST NON-CDC HGH THRU: HCPCS | Performed by: NURSE PRACTITIONER

## 2022-08-15 PROCEDURE — 99213 OFFICE O/P EST LOW 20 MIN: CPT | Performed by: NURSE PRACTITIONER

## 2022-08-15 RX ORDER — GUAIFENESIN 600 MG/1
600 TABLET, EXTENDED RELEASE ORAL 2 TIMES DAILY
Qty: 180 TABLET | Refills: 0 | Status: SHIPPED | OUTPATIENT
Start: 2022-08-15 | End: 2022-09-26

## 2022-08-15 NOTE — PROGRESS NOTES
Chief Complaint  Fatigue (COVID positive  8/4 would like test today )    Subjective          Yeimy Yang presents to [facility]  History of Present Illness  Patient is here for checkup.  She had COVID 1 August.  She would like a test today to make sure it is all clear.  She is coughing up mucus but she is feeling much better overall.  She stopped taking the Mucinex and finished up the cough syrup.  She still having the thick drainage but no fever or body aches.  She is still tired but getting better each day.      Allergies  Patient has no known allergies.    Social History     Tobacco Use   • Smoking status: Never Smoker   • Smokeless tobacco: Never Used   Vaping Use   • Vaping Use: Never used   Substance Use Topics   • Alcohol use: Never   • Drug use: Never       Family History   Problem Relation Age of Onset   • Heart disease Mother    • Arthritis Mother    • Heart disease Father    • Cancer Brother    • Lung cancer Other    • Malig Hyperthermia Neg Hx         Health Maintenance Due   Topic Date Due   • DXA SCAN  07/18/2021   • COVID-19 Vaccine (4 - Booster for Moderna series) 03/21/2022        Immunization History   Administered Date(s) Administered   • COVID-19 (MODERNA) 1st, 2nd, 3rd Dose Only 04/13/2021, 05/11/2021   • COVID-19 (MODERNA) BOOSTER 11/21/2021   • Fluzone High-Dose 65+yrs 10/08/2021   • Influenza, Unspecified 10/09/2020   • Pneumococcal Conjugate 13-Valent (PCV13) 01/27/2016   • Pneumococcal Polysaccharide (PPSV23) 10/27/2021   • Shingrix 05/13/2019, 07/18/2019   • TD Preservative Free 07/09/2019   • Td 12/16/1996   • Tdap 06/13/2012   • Zostavax 10/17/2013   • Zoster, Unspecified 05/13/2019, 07/22/2019       Review of Systems   Constitutional: Positive for fatigue.   HENT: Positive for congestion.    Respiratory: Positive for cough. Negative for shortness of breath.         Objective       Vitals:    08/15/22 1508   BP: 120/80   Pulse: 90   Resp: 20  "  Temp: 97.4 °F (36.3 °C)   SpO2: 97%   Weight: 79.2 kg (174 lb 9.6 oz)   Height: 156.2 cm (61.5\")       Body mass index is 32.46 kg/m².         Physical Exam  Vitals reviewed.   Constitutional:       Appearance: Normal appearance. She is well-developed.   Cardiovascular:      Rate and Rhythm: Normal rate and regular rhythm.      Heart sounds: Normal heart sounds. No murmur heard.  Pulmonary:      Effort: Pulmonary effort is normal.      Breath sounds: Normal breath sounds. No wheezing or rhonchi.   Neurological:      Mental Status: She is alert and oriented to person, place, and time.      Cranial Nerves: No cranial nerve deficit.      Motor: No weakness.   Psychiatric:         Mood and Affect: Mood and affect normal.             Result Review :     The following data was reviewed by: SERAFIN Gross on 08/15/2022:                     Assessment and Plan      Diagnoses and all orders for this visit:    1. COVID (Primary)  -     COVID-19,APTIMA PANTHER(JENA), ANA/ JANNIE, NP/OP SWAB IN UTM/VTM/SALINE TRANSPORT MEDIA,24 HR TAT - Swab, Nasopharynx  -     guaiFENesin (Mucinex) 600 MG 12 hr tablet; Take 1 tablet by mouth 2 (Two) Times a Day.  Dispense: 180 tablet; Refill: 0    2. Cough  -     guaiFENesin (Mucinex) 600 MG 12 hr tablet; Take 1 tablet by mouth 2 (Two) Times a Day.  Dispense: 180 tablet; Refill: 0            Follow Up     Return if symptoms worsen or fail to improve.  We will do a send out COVID today.  We will start Mucinex twice a day for at least 2 to 3 weeks.  Make sure she drink plenty of fluids.  Call with questions or concerns.  Glad she is feeling much better.  Patient was given instructions and counseling regarding her condition or for health maintenance advice. Please see specific information pulled into the AVS if appropriate.         SERAFIN Gross  08/15/2022  "

## 2022-08-16 LAB — SARS-COV-2 RNA PNL SPEC NAA+PROBE: NOT DETECTED

## 2022-09-15 ENCOUNTER — HOSPITAL ENCOUNTER (OUTPATIENT)
Dept: BONE DENSITY | Facility: HOSPITAL | Age: 77
Discharge: HOME | End: 2022-09-15
Admitting: NURSE PRACTITIONER

## 2022-09-15 PROCEDURE — 77080 DXA BONE DENSITY AXIAL: CPT

## 2022-09-21 ENCOUNTER — TELEPHONE (OUTPATIENT)
Dept: FAMILY MEDICINE CLINIC | Facility: CLINIC | Age: 77
End: 2022-09-21

## 2022-09-21 NOTE — TELEPHONE ENCOUNTER
Caller: Yeimy Yang    Relationship to patient: Self    Best call back number: [phone]    Patient is needing: PATIENT IS REQUESTING ANUCORT SUPPOSITORY TO BE SENT TO [facility] IN [place].

## 2022-09-22 NOTE — TELEPHONE ENCOUNTER
Patient stated she is not having any bleeding. But is having a lot of pain and burning. She also stated she has used these in the past with these S/S and they have helped.

## 2022-09-26 ENCOUNTER — OFFICE VISIT (OUTPATIENT)
Dept: FAMILY MEDICINE CLINIC | Facility: CLINIC | Age: 77
End: 2022-09-26

## 2022-09-26 VITALS
OXYGEN SATURATION: 97 % | BODY MASS INDEX: 32.39 KG/M2 | HEART RATE: 72 BPM | TEMPERATURE: 97.9 F | SYSTOLIC BLOOD PRESSURE: 120 MMHG | RESPIRATION RATE: 20 BRPM | HEIGHT: 62 IN | DIASTOLIC BLOOD PRESSURE: 82 MMHG | WEIGHT: 176 LBS

## 2022-09-26 DIAGNOSIS — I10 ESSENTIAL HYPERTENSION: ICD-10-CM

## 2022-09-26 DIAGNOSIS — F41.1 GAD (GENERALIZED ANXIETY DISORDER): ICD-10-CM

## 2022-09-26 DIAGNOSIS — E78.2 MIXED HYPERLIPIDEMIA: ICD-10-CM

## 2022-09-26 DIAGNOSIS — J30.89 ALLERGIC RHINITIS DUE TO OTHER ALLERGIC TRIGGER, UNSPECIFIED SEASONALITY: ICD-10-CM

## 2022-09-26 LAB
ALBUMIN SERPL-MCNC: 4.4 G/DL (ref 3.5–5.2)
ALBUMIN/GLOB SERPL: 1.8 G/DL
ALP SERPL-CCNC: 91 U/L (ref 39–117)
ALT SERPL W P-5'-P-CCNC: 17 U/L (ref 1–33)
ANION GAP SERPL CALCULATED.3IONS-SCNC: 10.4 MMOL/L (ref 5–15)
AST SERPL-CCNC: 19 U/L (ref 1–32)
BASOPHILS # BLD AUTO: 0.06 10*3/MM3 (ref 0–0.2)
BASOPHILS NFR BLD AUTO: 0.8 % (ref 0–1.5)
BILIRUB SERPL-MCNC: 0.4 MG/DL (ref 0–1.2)
BUN SERPL-MCNC: 13 MG/DL (ref 8–23)
BUN/CREAT SERPL: 22.4 (ref 7–25)
CALCIUM SPEC-SCNC: 9.6 MG/DL (ref 8.6–10.5)
CHLORIDE SERPL-SCNC: 104 MMOL/L (ref 98–107)
CHOLEST SERPL-MCNC: 166 MG/DL (ref 0–200)
CO2 SERPL-SCNC: 24.6 MMOL/L (ref 22–29)
CREAT SERPL-MCNC: 0.58 MG/DL (ref 0.57–1)
DEPRECATED RDW RBC AUTO: 44.2 FL (ref 37–54)
EGFRCR SERPLBLD CKD-EPI 2021: 93.3 ML/MIN/1.73
EOSINOPHIL # BLD AUTO: 0.46 10*3/MM3 (ref 0–0.4)
EOSINOPHIL NFR BLD AUTO: 5.9 % (ref 0.3–6.2)
ERYTHROCYTE [DISTWIDTH] IN BLOOD BY AUTOMATED COUNT: 13.8 % (ref 12.3–15.4)
GLOBULIN UR ELPH-MCNC: 2.5 GM/DL
GLUCOSE SERPL-MCNC: 102 MG/DL (ref 65–99)
HCT VFR BLD AUTO: 40.8 % (ref 34–46.6)
HDLC SERPL-MCNC: 54 MG/DL (ref 40–60)
HGB BLD-MCNC: 13.4 G/DL (ref 12–15.9)
IMM GRANULOCYTES # BLD AUTO: 0.03 10*3/MM3 (ref 0–0.05)
IMM GRANULOCYTES NFR BLD AUTO: 0.4 % (ref 0–0.5)
LDLC SERPL CALC-MCNC: 92 MG/DL (ref 0–100)
LDLC/HDLC SERPL: 1.65 {RATIO}
LYMPHOCYTES # BLD AUTO: 2.29 10*3/MM3 (ref 0.7–3.1)
LYMPHOCYTES NFR BLD AUTO: 29.3 % (ref 19.6–45.3)
MCH RBC QN AUTO: 28.8 PG (ref 26.6–33)
MCHC RBC AUTO-ENTMCNC: 32.8 G/DL (ref 31.5–35.7)
MCV RBC AUTO: 87.7 FL (ref 79–97)
MONOCYTES # BLD AUTO: 0.59 10*3/MM3 (ref 0.1–0.9)
MONOCYTES NFR BLD AUTO: 7.6 % (ref 5–12)
NEUTROPHILS NFR BLD AUTO: 4.38 10*3/MM3 (ref 1.7–7)
NEUTROPHILS NFR BLD AUTO: 56 % (ref 42.7–76)
NRBC BLD AUTO-RTO: 0 /100 WBC (ref 0–0.2)
PLATELET # BLD AUTO: 232 10*3/MM3 (ref 140–450)
PMV BLD AUTO: 10.9 FL (ref 6–12)
POTASSIUM SERPL-SCNC: 4.2 MMOL/L (ref 3.5–5.2)
PROT SERPL-MCNC: 6.9 G/DL (ref 6–8.5)
RBC # BLD AUTO: 4.65 10*6/MM3 (ref 3.77–5.28)
SODIUM SERPL-SCNC: 139 MMOL/L (ref 136–145)
TRIGL SERPL-MCNC: 114 MG/DL (ref 0–150)
TSH SERPL DL<=0.05 MIU/L-ACNC: 1.84 UIU/ML (ref 0.27–4.2)
VLDLC SERPL-MCNC: 20 MG/DL (ref 5–40)
WBC NRBC COR # BLD: 7.81 10*3/MM3 (ref 3.4–10.8)

## 2022-09-26 PROCEDURE — 84443 ASSAY THYROID STIM HORMONE: CPT | Performed by: NURSE PRACTITIONER

## 2022-09-26 PROCEDURE — 85025 COMPLETE CBC W/AUTO DIFF WBC: CPT | Performed by: NURSE PRACTITIONER

## 2022-09-26 PROCEDURE — 36415 COLL VENOUS BLD VENIPUNCTURE: CPT | Performed by: NURSE PRACTITIONER

## 2022-09-26 PROCEDURE — 99214 OFFICE O/P EST MOD 30 MIN: CPT | Performed by: NURSE PRACTITIONER

## 2022-09-26 PROCEDURE — 80053 COMPREHEN METABOLIC PANEL: CPT | Performed by: NURSE PRACTITIONER

## 2022-09-26 PROCEDURE — 80061 LIPID PANEL: CPT | Performed by: NURSE PRACTITIONER

## 2022-09-26 RX ORDER — LOSARTAN POTASSIUM 50 MG/1
50 TABLET ORAL DAILY
Qty: 90 TABLET | Refills: 1 | Status: SHIPPED | OUTPATIENT
Start: 2022-09-26 | End: 2023-03-28 | Stop reason: SDUPTHER

## 2022-09-26 RX ORDER — SIMVASTATIN 20 MG
20 TABLET ORAL NIGHTLY
Qty: 90 TABLET | Refills: 1 | Status: SHIPPED | OUTPATIENT
Start: 2022-09-26 | End: 2023-03-28 | Stop reason: SDUPTHER

## 2022-09-26 RX ORDER — MONTELUKAST SODIUM 10 MG/1
10 TABLET ORAL NIGHTLY
Qty: 90 TABLET | Refills: 1 | Status: SHIPPED | OUTPATIENT
Start: 2022-09-26 | End: 2022-12-27

## 2022-09-26 RX ORDER — PROPRANOLOL HYDROCHLORIDE 20 MG/1
20 TABLET ORAL 2 TIMES DAILY
Qty: 180 TABLET | Refills: 1 | Status: SHIPPED | OUTPATIENT
Start: 2022-09-26 | End: 2023-03-28 | Stop reason: SDUPTHER

## 2022-09-26 RX ORDER — CETIRIZINE HYDROCHLORIDE 10 MG/1
10 TABLET ORAL DAILY
Qty: 90 TABLET | Refills: 1 | Status: SHIPPED | OUTPATIENT
Start: 2022-09-26 | End: 2023-02-28

## 2022-09-26 RX ORDER — AMLODIPINE BESYLATE 2.5 MG/1
2.5 TABLET ORAL DAILY
Qty: 90 TABLET | Refills: 1 | Status: SHIPPED | OUTPATIENT
Start: 2022-09-26 | End: 2023-03-28 | Stop reason: SDUPTHER

## 2022-09-26 RX ORDER — PHENOL 1.4 %
600 AEROSOL, SPRAY (ML) MUCOUS MEMBRANE DAILY
COMMUNITY

## 2022-09-26 NOTE — PROGRESS NOTES
Chief Complaint  Hypertension, Hyperlipidemia, and Depression    Subjective          Yeimy Yang presents to [facility]  History of Present Illness  HTN: She is currently on amlodipine doing well.  She is on losartan for blood pressure.  LIPID: She is on simvastatin  OA: She is doing well overall.  She is very active.  She takes her calcium and her vitamin D.  DEPRESSION/MICHI: She is on Zoloft and doing well.  She is fasting today.  They are heading to [place] soon.      Allergies  Patient has no known allergies.    Social History     Tobacco Use   • Smoking status: Never Smoker   • Smokeless tobacco: Never Used   Vaping Use   • Vaping Use: Never used   Substance Use Topics   • Alcohol use: Never   • Drug use: Never       Family History   Problem Relation Age of Onset   • Heart disease Mother    • Arthritis Mother    • Heart disease Father    • Cancer Brother    • Lung cancer Other    • Malig Hyperthermia Neg Hx         Health Maintenance Due   Topic Date Due   • COVID-19 Vaccine (4 - Booster for Moderna series) 01/16/2022        Immunization History   Administered Date(s) Administered   • COVID-19 (MODERNA) 1st, 2nd, 3rd Dose Only 04/13/2021, 05/11/2021   • COVID-19 (MODERNA) BOOSTER 11/21/2021   • Fluzone High-Dose 65+yrs 10/08/2021   • Influenza, Unspecified 10/09/2020   • Pneumococcal Conjugate 13-Valent (PCV13) 01/27/2016   • Pneumococcal Polysaccharide (PPSV23) 10/27/2021   • Shingrix 05/13/2019, 07/18/2019   • TD Preservative Free 07/09/2019   • Td 12/16/1996   • Tdap 06/13/2012   • Zostavax 10/17/2013   • Zoster, Unspecified 05/13/2019, 07/22/2019       Review of Systems   Constitutional: Negative for fatigue.   Respiratory: Negative for cough and shortness of breath.    Cardiovascular: Negative for chest pain.   Gastrointestinal: Negative for diarrhea, nausea and vomiting.        Objective       Vitals:    09/26/22 1022   BP: 120/82   Pulse: 72   Resp: 20   Temp:  "97.9 °F (36.6 °C)   SpO2: 97%   Weight: 79.8 kg (176 lb)   Height: 156.2 cm (61.5\")       Body mass index is 32.72 kg/m².         Physical Exam  Vitals reviewed.   Constitutional:       Appearance: Normal appearance. She is well-developed.   Cardiovascular:      Rate and Rhythm: Normal rate and regular rhythm.      Heart sounds: Normal heart sounds. No murmur heard.  Pulmonary:      Effort: Pulmonary effort is normal.      Breath sounds: Normal breath sounds.   Neurological:      Mental Status: She is alert and oriented to person, place, and time.      Cranial Nerves: No cranial nerve deficit.      Motor: No weakness.   Psychiatric:         Mood and Affect: Mood and affect normal.             Result Review :     The following data was reviewed by: SERAFIN Gross on 09/26/2022:    Common labs    Common Labs 10/29/21 10/29/21 10/29/21 5/25/22 5/25/22 5/25/22    1051 1051 1051 1050 1050 1050   Glucose  98    103 (A)   BUN  15    18   Creatinine  0.70    0.87   eGFR Non African Am  81       Sodium  139    143   Potassium  4.5    5.1   Chloride  102    104   Calcium  9.9    10.3   Albumin  4.90    4.50   Total Bilirubin  0.4    0.5   Alkaline Phosphatase  94    85   AST (SGOT)  20    22   ALT (SGPT)  20    18   WBC 10.05   8.34     Hemoglobin 13.8   13.9     Hematocrit 41.9   42.3     Platelets 279   258     Total Cholesterol   216 (A)  176    Triglycerides   168 (A)  109    HDL Cholesterol   53  53    LDL Cholesterol    133 (A)  103 (A)    (A) Abnormal value                               Assessment and Plan      Diagnoses and all orders for this visit:    1. Essential hypertension  -     amLODIPine (NORVASC) 2.5 MG tablet; Take 1 tablet by mouth Daily.  Dispense: 90 tablet; Refill: 1  -     losartan (COZAAR) 50 MG tablet; Take 1 tablet by mouth Daily.  Dispense: 90 tablet; Refill: 1  -     propranolol (INDERAL) 20 MG tablet; Take 1 tablet by mouth 2 (Two) Times a Day. HOLD rx until they request  Dispense: " 180 tablet; Refill: 1  -     Comprehensive Metabolic Panel  -     CBC & Differential  -     TSH  -     Lipid Panel    2. Allergic rhinitis due to other allergic trigger, unspecified seasonality  -     montelukast (SINGULAIR) 10 MG tablet; Take 1 tablet by mouth Every Night.  Dispense: 90 tablet; Refill: 1  -     cetirizine (zyrTEC) 10 MG tablet; Take 1 tablet by mouth Daily.  Dispense: 90 tablet; Refill: 1    3. MICHI (generalized anxiety disorder)  -     propranolol (INDERAL) 20 MG tablet; Take 1 tablet by mouth 2 (Two) Times a Day. HOLD rx until they request  Dispense: 180 tablet; Refill: 1  -     sertraline (ZOLOFT) 50 MG tablet; Take 1 tablet by mouth Every Morning.  Dispense: 90 tablet; Refill: 1    4. Mixed hyperlipidemia  -     simvastatin (Zocor) 20 MG tablet; Take 1 tablet by mouth Every Night.  Dispense: 90 tablet; Refill: 1  -     Comprehensive Metabolic Panel  -     CBC & Differential  -     TSH  -     Lipid Panel            Follow Up     Return in about 6 months (around 4/11/2023) for Medicare Wellness 10/27/2022.  Follow-up in 6 months for labs and appt. Call with any concerns or questions that you may have regarding your medications or history.    I have reviewed all medications and at this time no medications changes need to be adjusted for all chronic conditions.    Patient was given instructions and counseling regarding her condition or for health maintenance advice. Please see specific information pulled into the AVS if appropriate.         Anna Caldwell, SERAFIN  09/26/2022

## 2022-09-27 NOTE — TELEPHONE ENCOUNTER
Patient said she is some better. She is using Recta Smooth cream seems to be helping. She is aware if worsens to phone office for appointment or to ER.

## 2022-12-08 DIAGNOSIS — E55.9 VITAMIN D DEFICIENCY: ICD-10-CM

## 2022-12-08 RX ORDER — MULTIVIT-MIN/IRON/FOLIC ACID/K 18-600-40
CAPSULE ORAL
Qty: 90 TABLET | Refills: 0 | Status: SHIPPED | OUTPATIENT
Start: 2022-12-08 | End: 2023-03-07

## 2022-12-26 DIAGNOSIS — J30.89 ALLERGIC RHINITIS DUE TO OTHER ALLERGIC TRIGGER, UNSPECIFIED SEASONALITY: ICD-10-CM

## 2022-12-27 RX ORDER — MONTELUKAST SODIUM 10 MG/1
TABLET ORAL
Qty: 90 TABLET | Refills: 0 | Status: SHIPPED | OUTPATIENT
Start: 2022-12-27 | End: 2023-03-28 | Stop reason: SDUPTHER

## 2023-02-28 DIAGNOSIS — J30.89 ALLERGIC RHINITIS DUE TO OTHER ALLERGIC TRIGGER, UNSPECIFIED SEASONALITY: ICD-10-CM

## 2023-02-28 RX ORDER — CETIRIZINE HYDROCHLORIDE 10 MG/1
TABLET, FILM COATED ORAL
Qty: 90 TABLET | Refills: 0 | Status: SHIPPED | OUTPATIENT
Start: 2023-02-28

## 2023-03-07 DIAGNOSIS — E55.9 VITAMIN D DEFICIENCY: ICD-10-CM

## 2023-03-07 RX ORDER — MULTIVIT-MIN/IRON/FOLIC ACID/K 18-600-40
CAPSULE ORAL
Qty: 90 TABLET | Refills: 0 | Status: SHIPPED | OUTPATIENT
Start: 2023-03-07 | End: 2023-03-28 | Stop reason: SDUPTHER

## 2023-03-28 DIAGNOSIS — F41.1 GAD (GENERALIZED ANXIETY DISORDER): ICD-10-CM

## 2023-03-28 DIAGNOSIS — E78.2 MIXED HYPERLIPIDEMIA: ICD-10-CM

## 2023-03-28 DIAGNOSIS — E55.9 VITAMIN D DEFICIENCY: ICD-10-CM

## 2023-03-28 DIAGNOSIS — J30.89 ALLERGIC RHINITIS DUE TO OTHER ALLERGIC TRIGGER, UNSPECIFIED SEASONALITY: ICD-10-CM

## 2023-03-28 DIAGNOSIS — I10 ESSENTIAL HYPERTENSION: ICD-10-CM

## 2023-03-28 RX ORDER — MELATONIN
1000 DAILY
Qty: 30 TABLET | Refills: 0 | Status: SHIPPED | OUTPATIENT
Start: 2023-03-28

## 2023-03-28 RX ORDER — MONTELUKAST SODIUM 10 MG/1
10 TABLET ORAL NIGHTLY
Qty: 30 TABLET | Refills: 0 | Status: SHIPPED | OUTPATIENT
Start: 2023-03-28

## 2023-03-28 RX ORDER — AMLODIPINE BESYLATE 2.5 MG/1
2.5 TABLET ORAL DAILY
Qty: 30 TABLET | Refills: 0 | Status: SHIPPED | OUTPATIENT
Start: 2023-03-28

## 2023-03-28 RX ORDER — LOSARTAN POTASSIUM 50 MG/1
50 TABLET ORAL DAILY
Qty: 30 TABLET | Refills: 0 | Status: SHIPPED | OUTPATIENT
Start: 2023-03-28

## 2023-03-28 RX ORDER — SIMVASTATIN 20 MG
20 TABLET ORAL NIGHTLY
Qty: 30 TABLET | Refills: 0 | Status: SHIPPED | OUTPATIENT
Start: 2023-03-28

## 2023-03-28 RX ORDER — PROPRANOLOL HYDROCHLORIDE 20 MG/1
20 TABLET ORAL 2 TIMES DAILY
Qty: 60 TABLET | Refills: 0 | Status: SHIPPED | OUTPATIENT
Start: 2023-03-28

## 2023-04-12 ENCOUNTER — OFFICE VISIT (OUTPATIENT)
Dept: FAMILY MEDICINE CLINIC | Facility: CLINIC | Age: 78
End: 2023-04-12
Payer: MEDICARE

## 2023-04-12 VITALS
HEART RATE: 74 BPM | BODY MASS INDEX: 32.9 KG/M2 | TEMPERATURE: 97.8 F | DIASTOLIC BLOOD PRESSURE: 80 MMHG | SYSTOLIC BLOOD PRESSURE: 136 MMHG | WEIGHT: 178.8 LBS | OXYGEN SATURATION: 97 % | RESPIRATION RATE: 20 BRPM | HEIGHT: 62 IN

## 2023-04-12 DIAGNOSIS — Z23 NEED FOR VACCINATION: ICD-10-CM

## 2023-04-12 DIAGNOSIS — Z00.00 MEDICARE ANNUAL WELLNESS VISIT, SUBSEQUENT: Primary | ICD-10-CM

## 2023-04-12 DIAGNOSIS — I10 ESSENTIAL HYPERTENSION: ICD-10-CM

## 2023-04-12 DIAGNOSIS — E55.9 VITAMIN D DEFICIENCY: ICD-10-CM

## 2023-04-12 DIAGNOSIS — E78.2 MIXED HYPERLIPIDEMIA: ICD-10-CM

## 2023-04-12 DIAGNOSIS — F41.1 GAD (GENERALIZED ANXIETY DISORDER): ICD-10-CM

## 2023-04-12 DIAGNOSIS — J30.89 ALLERGIC RHINITIS DUE TO OTHER ALLERGIC TRIGGER, UNSPECIFIED SEASONALITY: ICD-10-CM

## 2023-04-12 LAB
ALBUMIN SERPL-MCNC: 4.5 G/DL (ref 3.5–5.2)
ALBUMIN/GLOB SERPL: 1.9 G/DL
ALP SERPL-CCNC: 95 U/L (ref 39–117)
ALT SERPL W P-5'-P-CCNC: 19 U/L (ref 1–33)
ANION GAP SERPL CALCULATED.3IONS-SCNC: 10.6 MMOL/L (ref 5–15)
AST SERPL-CCNC: 19 U/L (ref 1–32)
BASOPHILS # BLD AUTO: 0.05 10*3/MM3 (ref 0–0.2)
BASOPHILS NFR BLD AUTO: 0.6 % (ref 0–1.5)
BILIRUB SERPL-MCNC: <0.2 MG/DL (ref 0–1.2)
BUN SERPL-MCNC: 18 MG/DL (ref 8–23)
BUN/CREAT SERPL: 21.2 (ref 7–25)
CALCIUM SPEC-SCNC: 9.8 MG/DL (ref 8.6–10.5)
CHLORIDE SERPL-SCNC: 104 MMOL/L (ref 98–107)
CHOLEST SERPL-MCNC: 164 MG/DL (ref 0–200)
CO2 SERPL-SCNC: 24.4 MMOL/L (ref 22–29)
CREAT SERPL-MCNC: 0.85 MG/DL (ref 0.57–1)
DEPRECATED RDW RBC AUTO: 41.3 FL (ref 37–54)
EGFRCR SERPLBLD CKD-EPI 2021: 70.2 ML/MIN/1.73
EOSINOPHIL # BLD AUTO: 0.38 10*3/MM3 (ref 0–0.4)
EOSINOPHIL NFR BLD AUTO: 4.3 % (ref 0.3–6.2)
ERYTHROCYTE [DISTWIDTH] IN BLOOD BY AUTOMATED COUNT: 13 % (ref 12.3–15.4)
GLOBULIN UR ELPH-MCNC: 2.4 GM/DL
GLUCOSE SERPL-MCNC: 125 MG/DL (ref 65–99)
HCT VFR BLD AUTO: 40.5 % (ref 34–46.6)
HDLC SERPL-MCNC: 46 MG/DL (ref 40–60)
HGB BLD-MCNC: 13.4 G/DL (ref 12–15.9)
IMM GRANULOCYTES # BLD AUTO: 0.03 10*3/MM3 (ref 0–0.05)
IMM GRANULOCYTES NFR BLD AUTO: 0.3 % (ref 0–0.5)
LDLC SERPL CALC-MCNC: 86 MG/DL (ref 0–100)
LDLC/HDLC SERPL: 1.76 {RATIO}
LYMPHOCYTES # BLD AUTO: 2.86 10*3/MM3 (ref 0.7–3.1)
LYMPHOCYTES NFR BLD AUTO: 32.6 % (ref 19.6–45.3)
MCH RBC QN AUTO: 28.9 PG (ref 26.6–33)
MCHC RBC AUTO-ENTMCNC: 33.1 G/DL (ref 31.5–35.7)
MCV RBC AUTO: 87.5 FL (ref 79–97)
MONOCYTES # BLD AUTO: 0.78 10*3/MM3 (ref 0.1–0.9)
MONOCYTES NFR BLD AUTO: 8.9 % (ref 5–12)
NEUTROPHILS NFR BLD AUTO: 4.67 10*3/MM3 (ref 1.7–7)
NEUTROPHILS NFR BLD AUTO: 53.3 % (ref 42.7–76)
NRBC BLD AUTO-RTO: 0 /100 WBC (ref 0–0.2)
PLATELET # BLD AUTO: 259 10*3/MM3 (ref 140–450)
PMV BLD AUTO: 11 FL (ref 6–12)
POTASSIUM SERPL-SCNC: 3.5 MMOL/L (ref 3.5–5.2)
PROT SERPL-MCNC: 6.9 G/DL (ref 6–8.5)
RBC # BLD AUTO: 4.63 10*6/MM3 (ref 3.77–5.28)
SODIUM SERPL-SCNC: 139 MMOL/L (ref 136–145)
TRIGL SERPL-MCNC: 185 MG/DL (ref 0–150)
TSH SERPL DL<=0.05 MIU/L-ACNC: 1.91 UIU/ML (ref 0.27–4.2)
VLDLC SERPL-MCNC: 32 MG/DL (ref 5–40)
WBC NRBC COR # BLD: 8.77 10*3/MM3 (ref 3.4–10.8)

## 2023-04-12 PROCEDURE — 80053 COMPREHEN METABOLIC PANEL: CPT | Performed by: NURSE PRACTITIONER

## 2023-04-12 PROCEDURE — 80061 LIPID PANEL: CPT | Performed by: NURSE PRACTITIONER

## 2023-04-12 PROCEDURE — 85025 COMPLETE CBC W/AUTO DIFF WBC: CPT | Performed by: NURSE PRACTITIONER

## 2023-04-12 PROCEDURE — 82306 VITAMIN D 25 HYDROXY: CPT | Performed by: NURSE PRACTITIONER

## 2023-04-12 PROCEDURE — 84443 ASSAY THYROID STIM HORMONE: CPT | Performed by: NURSE PRACTITIONER

## 2023-04-12 RX ORDER — MONTELUKAST SODIUM 10 MG/1
10 TABLET ORAL NIGHTLY
Qty: 90 TABLET | Refills: 1 | Status: SHIPPED | OUTPATIENT
Start: 2023-04-12

## 2023-04-12 RX ORDER — AMLODIPINE BESYLATE 2.5 MG/1
2.5 TABLET ORAL DAILY
Qty: 90 TABLET | Refills: 1 | Status: SHIPPED | OUTPATIENT
Start: 2023-04-12

## 2023-04-12 RX ORDER — SIMVASTATIN 20 MG
20 TABLET ORAL NIGHTLY
Qty: 90 TABLET | Refills: 1 | Status: SHIPPED | OUTPATIENT
Start: 2023-04-12

## 2023-04-12 RX ORDER — PROPRANOLOL HYDROCHLORIDE 20 MG/1
20 TABLET ORAL 2 TIMES DAILY
Qty: 180 TABLET | Refills: 1 | Status: SHIPPED | OUTPATIENT
Start: 2023-04-12

## 2023-04-12 RX ORDER — MELATONIN
1000 DAILY
Qty: 90 TABLET | Refills: 1 | Status: SHIPPED | OUTPATIENT
Start: 2023-04-12

## 2023-04-12 RX ORDER — CETIRIZINE HYDROCHLORIDE 10 MG/1
10 TABLET ORAL DAILY
Qty: 90 TABLET | Refills: 1 | Status: SHIPPED | OUTPATIENT
Start: 2023-04-12

## 2023-04-12 RX ORDER — LOSARTAN POTASSIUM 50 MG/1
50 TABLET ORAL DAILY
Qty: 90 TABLET | Refills: 1 | Status: SHIPPED | OUTPATIENT
Start: 2023-04-12

## 2023-04-12 NOTE — PROGRESS NOTES
The ABCs of the Annual Wellness Visit  Subsequent Medicare Wellness Visit    Subjective    Yeimy Yang is a 78 y.o. female who presents for a Subsequent Medicare Wellness Visit.    The following portions of the patient's history were reviewed and   updated as appropriate: allergies, current medications, past family history, past medical history, past social history, past surgical history and problem list.    Compared to one year ago, the patient feels her physical   health is the same.    Compared to one year ago, the patient feels her mental   health is the same.    Recent Hospitalizations:  She was not admitted to the hospital during the last year.       Current Medical Providers:  Patient Care Team:  Anna Caldwell APRN as PCP - General (Nurse Practitioner)    Outpatient Medications Prior to Visit   Medication Sig Dispense Refill   • ascorbic acid (VITAMIN C) 500 MG tablet Take 1 tablet by mouth Daily.     • aspirin 81 MG chewable tablet Chew 1 tablet Daily.     • calcium carbonate (OS-ALEXA) 600 MG tablet Take 1 tablet by mouth Daily.     • Garlic 1000 MG capsule Take  by mouth.     • multivitamin with minerals tablet tablet Take 1 tablet by mouth Daily.     • Omega-3 Fatty Acids (fish oil) 1000 MG capsule capsule Take  by mouth Daily With Breakfast.     • Vitamin B Complex-C capsule Take  by mouth.     • Zinc 50 MG capsule Take  by mouth.     • amLODIPine (NORVASC) 2.5 MG tablet Take 1 tablet by mouth Daily. 30 tablet 0   • cholecalciferol (Vitamin D, Cholecalciferol,) 25 MCG (1000 UT) tablet Take 1 tablet by mouth Daily. 30 tablet 0   • EQ Allergy Relief, Cetirizine, 10 MG tablet Take 1 tablet by mouth once daily 90 tablet 0   • losartan (COZAAR) 50 MG tablet Take 1 tablet by mouth Daily. 30 tablet 0   • montelukast (SINGULAIR) 10 MG tablet Take 1 tablet by mouth Every Night. 30 tablet 0   • propranolol (INDERAL) 20 MG tablet Take 1 tablet by mouth 2 (Two) Times a Day. HOLD rx until they request  "60 tablet 0   • sertraline (ZOLOFT) 50 MG tablet Take 1 tablet by mouth Every Morning. 30 tablet 0   • simvastatin (Zocor) 20 MG tablet Take 1 tablet by mouth Every Night. 30 tablet 0     No facility-administered medications prior to visit.       No opioid medication identified on active medication list. I have reviewed chart for other potential  high risk medication/s and harmful drug interactions in the elderly.          Aspirin is on active medication list. Aspirin use is indicated based on review of current medical condition/s. Pros and cons of this therapy have been discussed today. Benefits of this medication outweigh potential harm.  Patient has been encouraged to continue taking this medication.  .      Patient Active Problem List   Diagnosis   • Knee osteoarthritis   • Arthritis   • Allergic rhinitis due to allergen   • Carpal tunnel syndrome   • Essential hypertension   • Fatigue   • Greater trochanteric bursitis   • Hyperlipidemia   • Arthritis   • Osteoarthritis of knee   • Left knee pain   • Aftercare following left knee joint replacement surgery     Advance Care Planning   Advance Care Planning     Advance Directive is on file.  ACP discussion was declined by the patient. Patient has an advance directive in EMR which is still valid.      Objective    Vitals:    04/12/23 1500   BP: 136/80   Pulse: 74   Resp: 20   Temp: 97.8 °F (36.6 °C)   SpO2: 97%   Weight: 81.1 kg (178 lb 12.8 oz)   Height: 156.2 cm (61.5\")     Estimated body mass index is 33.24 kg/m² as calculated from the following:    Height as of this encounter: 156.2 cm (61.5\").    Weight as of this encounter: 81.1 kg (178 lb 12.8 oz).    BMI is >= 30 and <35. (Class 1 Obesity). The following options were offered after discussion;: exercise counseling/recommendations and nutrition counseling/recommendations      Does the patient have evidence of cognitive impairment? No          HEALTH RISK ASSESSMENT    Smoking Status:  Social History "     Tobacco Use   Smoking Status Never   Smokeless Tobacco Never     Alcohol Consumption:  Social History     Substance and Sexual Activity   Alcohol Use Never     Fall Risk Screen:    ALEYDAHERLINDA Fall Risk Assessment was completed, and patient is at LOW risk for falls.Assessment completed on:2023    Depression Screenin/12/2023     3:01 PM   PHQ-2/PHQ-9 Depression Screening   Little Interest or Pleasure in Doing Things 0-->not at all   Feeling Down, Depressed or Hopeless 0-->not at all   PHQ-9: Brief Depression Severity Measure Score 0       Health Habits and Functional and Cognitive Screenin/12/2023     3:10 PM   Functional & Cognitive Status   Do you have difficulty preparing food and eating? No   Do you have difficulty bathing yourself, getting dressed or grooming yourself? No   Do you have difficulty using the toilet? No   Do you have difficulty moving around from place to place? No   Do you have trouble with steps or getting out of a bed or a chair? No   Current Diet Well Balanced Diet   Dental Exam Up to date   Eye Exam Up to date   Exercise (times per week) 5 times per week   Current Exercises Include Walking;Bicycling Outdoors        Exercise Comment whwn she was in [place]   Do you need help using the phone?  No   Are you deaf or do you have serious difficulty hearing?  Yes   Do you need help with transportation? No   Do you need help shopping? No   Do you need help preparing meals?  No   Do you need help with housework?  No   Do you need help with laundry? No   Do you need help taking your medications? No   Do you need help managing money? No   Do you ever drive or ride in a car without wearing a seat belt? No   Have you felt unusual stress, anger or loneliness in the last month? No   Who do you live with? Spouse   If you need help, do you have trouble finding someone available to you? No   Have you been bothered in the last four weeks by sexual problems? No   Do you have difficulty  concentrating, remembering or making decisions? Yes       Age-appropriate Screening Schedule:  Refer to the list below for future screening recommendations based on patient's age, sex and/or medical conditions. Orders for these recommended tests are listed in the plan section. The patient has been provided with a written plan.    Health Maintenance   Topic Date Due   • INFLUENZA VACCINE  08/01/2023   • LIPID PANEL  09/26/2023   • ANNUAL WELLNESS VISIT  04/12/2024   • DXA SCAN  09/15/2024   • COLORECTAL CANCER SCREENING  06/16/2026   • TDAP/TD VACCINES (4 - Td or Tdap) 07/09/2029   • HEPATITIS C SCREENING  Completed   • COVID-19 Vaccine  Completed   • Pneumococcal Vaccine 65+  Completed   • ZOSTER VACCINE  Completed                  CMS Preventative Services Quick Reference  Risk Factors Identified During Encounter  Fall Risk-High or Moderate: Discussed Fall Prevention in the home  Immunizations Discussed/Encouraged: Tdap, Influenza, Prevnar 20 (Pneumococcal 20-valent conjugate), Shingrix and COVID19  Dental Screening Recommended  Vision Screening Recommended  The above risks/problems have been discussed with the patient.  Pertinent information has been shared with the patient in the After Visit Summary.  An After Visit Summary and PPPS were made available to the patient.    Follow Up:   Next Medicare Wellness visit to be scheduled in 1 year.       Additional E&M Note during same encounter follows:  Patient has multiple medical problems which are significant and separately identifiable that require additional work above and beyond the Medicare Wellness Visit.      Chief Complaint  Hypertension, Hyperlipidemia, Depression, and Medicare Wellness-subsequent    Subjective        HPI  Yeimy Yang is also being seen today for   HTN: She is currently on amlodipine doing well.  She is on losartan for blood pressure.  LIPID: She is on simvastatin and is not fasting today but wants to do her labs.  She did have a  "chicken pot pie.  OA: She is doing well overall.  She is very active.  She takes her calcium and her vitamin D.  DEPRESSION/MICHI: She is on Zoloft and doing well.  Review of Systems   Constitutional: Negative for fatigue.   Respiratory: Negative for cough and shortness of breath.    Cardiovascular: Negative for chest pain and leg swelling.   Gastrointestinal: Negative for nausea and vomiting.   Psychiatric/Behavioral: Negative for hallucinations and suicidal ideas.       Objective   Vital Signs:  /80   Pulse 74   Temp 97.8 °F (36.6 °C)   Resp 20   Ht 156.2 cm (61.5\")   Wt 81.1 kg (178 lb 12.8 oz)   SpO2 97%   BMI 33.24 kg/m²     Physical Exam  Vitals reviewed.   Constitutional:       Appearance: Normal appearance. She is well-developed.   Cardiovascular:      Rate and Rhythm: Normal rate and regular rhythm.      Heart sounds: Normal heart sounds. No murmur heard.  Pulmonary:      Effort: Pulmonary effort is normal.      Breath sounds: Normal breath sounds.   Neurological:      Mental Status: She is alert and oriented to person, place, and time.      Cranial Nerves: No cranial nerve deficit.      Motor: No weakness.   Psychiatric:         Mood and Affect: Mood and affect normal.            Common labs        5/25/2022    10:50 9/26/2022    10:49   Common Labs   Glucose 103   102     BUN 18   13     Creatinine 0.87   0.58     Sodium 143   139     Potassium 5.1   4.2     Chloride 104   104     Calcium 10.3   9.6     Albumin 4.50   4.40     Total Bilirubin 0.5   0.4     Alkaline Phosphatase 85   91     AST (SGOT) 22   19     ALT (SGPT) 18   17     WBC 8.34   7.81     Hemoglobin 13.9   13.4     Hematocrit 42.3   40.8     Platelets 258   232     Total Cholesterol 176   166     Triglycerides 109   114     HDL Cholesterol 53   54     LDL Cholesterol  103   92                  Assessment and Plan   Diagnoses and all orders for this visit:    1. Medicare annual wellness visit, subsequent (Primary)    2. Mixed " hyperlipidemia  -     simvastatin (Zocor) 20 MG tablet; Take 1 tablet by mouth Every Night.  Dispense: 90 tablet; Refill: 1    3. MICHI (generalized anxiety disorder)  -     sertraline (ZOLOFT) 50 MG tablet; Take 1 tablet by mouth Every Morning.  Dispense: 90 tablet; Refill: 1  -     propranolol (INDERAL) 20 MG tablet; Take 1 tablet by mouth 2 (Two) Times a Day. HOLD rx until they request  Dispense: 180 tablet; Refill: 1    4. Essential hypertension  -     propranolol (INDERAL) 20 MG tablet; Take 1 tablet by mouth 2 (Two) Times a Day. HOLD rx until they request  Dispense: 180 tablet; Refill: 1  -     losartan (COZAAR) 50 MG tablet; Take 1 tablet by mouth Daily.  Dispense: 90 tablet; Refill: 1  -     amLODIPine (NORVASC) 2.5 MG tablet; Take 1 tablet by mouth Daily.  Dispense: 90 tablet; Refill: 1  -     Comprehensive Metabolic Panel  -     CBC & Differential  -     TSH  -     Lipid Panel    5. Allergic rhinitis due to other allergic trigger, unspecified seasonality  -     montelukast (SINGULAIR) 10 MG tablet; Take 1 tablet by mouth Every Night.  Dispense: 90 tablet; Refill: 1  -     cetirizine (EQ Allergy Relief, Cetirizine,) 10 MG tablet; Take 1 tablet by mouth Daily.  Dispense: 90 tablet; Refill: 1    6. Vitamin D deficiency  -     cholecalciferol (Vitamin D, Cholecalciferol,) 25 MCG (1000 UT) tablet; Take 1 tablet by mouth Daily.  Dispense: 90 tablet; Refill: 1  -     Vitamin D,25-Hydroxy    7. Need for vaccination  -     COVID-19 Bivalent Booster (Pfizer) 12+yrs             Follow Up   Return in about 6 months (around 10/12/2023).   Follow-up in 6 months for labs and appt. Call with any concerns or questions that you may have regarding your medications or history.    I have reviewed all medications and at this time no medications changes need to be adjusted for all chronic conditions.  She was not fasting today.  Parts of this note are electronic transcriptions/translations of spoken language to printed text using  the Dragon Dictation system.    Patient was given instructions and counseling regarding her condition or for health maintenance advice. Please see specific information pulled into the AVS if appropriate.     Anna Caldwell, APRN  04/12/2023

## 2023-04-13 LAB — 25(OH)D3 SERPL-MCNC: 49.9 NG/ML (ref 30–100)

## 2023-09-08 ENCOUNTER — TELEPHONE (OUTPATIENT)
Dept: FAMILY MEDICINE CLINIC | Facility: CLINIC | Age: 78
End: 2023-09-08

## 2023-09-08 ENCOUNTER — OFFICE VISIT (OUTPATIENT)
Dept: FAMILY MEDICINE CLINIC | Facility: CLINIC | Age: 78
End: 2023-09-08
Payer: MEDICARE

## 2023-09-08 VITALS
SYSTOLIC BLOOD PRESSURE: 132 MMHG | DIASTOLIC BLOOD PRESSURE: 72 MMHG | TEMPERATURE: 96.9 F | HEART RATE: 86 BPM | WEIGHT: 178 LBS | HEIGHT: 62 IN | BODY MASS INDEX: 32.76 KG/M2 | OXYGEN SATURATION: 98 %

## 2023-09-08 DIAGNOSIS — J06.9 UPPER RESPIRATORY INFECTION WITH COUGH AND CONGESTION: Primary | ICD-10-CM

## 2023-09-08 DIAGNOSIS — K13.79 MOUTH SORES: ICD-10-CM

## 2023-09-08 DIAGNOSIS — L60.0 INGROWN LEFT GREATER TOENAIL: ICD-10-CM

## 2023-09-08 LAB
EXPIRATION DATE: NORMAL
FLUAV AG UPPER RESP QL IA.RAPID: NOT DETECTED
FLUBV AG UPPER RESP QL IA.RAPID: NOT DETECTED
INTERNAL CONTROL: NORMAL
Lab: NORMAL
SARS-COV-2 AG UPPER RESP QL IA.RAPID: NOT DETECTED

## 2023-09-08 PROCEDURE — 3075F SYST BP GE 130 - 139MM HG: CPT | Performed by: NURSE PRACTITIONER

## 2023-09-08 PROCEDURE — 3078F DIAST BP <80 MM HG: CPT | Performed by: NURSE PRACTITIONER

## 2023-09-08 PROCEDURE — 1159F MED LIST DOCD IN RCRD: CPT | Performed by: NURSE PRACTITIONER

## 2023-09-08 PROCEDURE — 1160F RVW MEDS BY RX/DR IN RCRD: CPT | Performed by: NURSE PRACTITIONER

## 2023-09-08 PROCEDURE — 87428 SARSCOV & INF VIR A&B AG IA: CPT | Performed by: NURSE PRACTITIONER

## 2023-09-08 PROCEDURE — 99214 OFFICE O/P EST MOD 30 MIN: CPT | Performed by: NURSE PRACTITIONER

## 2023-09-08 RX ORDER — PREDNISONE 20 MG/1
TABLET ORAL
Qty: 9 TABLET | Refills: 0 | Status: SHIPPED | OUTPATIENT
Start: 2023-09-08 | End: 2023-09-12

## 2023-09-08 NOTE — TELEPHONE ENCOUNTER
Justine has since addressed this as she was the provider that seen pt today. Pt has been called and left vm to contact [facility] office. [facility]

## 2023-09-08 NOTE — PROGRESS NOTES
"Chief Complaint  Cough (Started 9/6/23) and Nasal Congestion    Subjective          Yeimy Yang, 78 y.o. female presents to [facility]  History of Present Illness   Patient presents today for an acute visit.  She is a patient SERAFIN Gross.  She is complaining of cough and congestion for the past 2 days. She denies any fevers, chills, ear pain or sore throat.   She is taking OTC cough Robitussin and nasal spray which did help her sleep.   She also complains of sores on the inside of her cheeks.  She states she has switched with the milk of magnesia as recommended by Mica.  She states she has tried Orajel and some kind of over-the-counter mouthwash for sores but nothing seems to help.  She is also complaining of soreness in her left great toe that rubs on her shoes.    In office COVID and flu swabs are both negative.     Tobacco Use: Low Risk     Smoking Tobacco Use: Never    Smokeless Tobacco Use: Never    Passive Exposure: Not on file      Objective   Vital Signs:   /72 (BP Location: Right arm, Patient Position: Sitting, Cuff Size: Adult)   Pulse 86   Temp 96.9 °F (36.1 °C)   Ht 156.2 cm (61.5\")   Wt 80.7 kg (178 lb)   SpO2 98%   BMI 33.09 kg/m²       Current Outpatient Medications:     amLODIPine (NORVASC) 2.5 MG tablet, Take 1 tablet by mouth Daily., Disp: 90 tablet, Rfl: 1    ascorbic acid (VITAMIN C) 500 MG tablet, Take 1 tablet by mouth Daily., Disp: , Rfl:     aspirin 81 MG chewable tablet, Chew 1 tablet Daily., Disp: , Rfl:     calcium carbonate (OS-ALEXA) 600 MG tablet, Take 1 tablet by mouth Daily., Disp: , Rfl:     cetirizine (EQ Allergy Relief, Cetirizine,) 10 MG tablet, Take 1 tablet by mouth Daily., Disp: 90 tablet, Rfl: 1    cholecalciferol (Vitamin D, Cholecalciferol,) 25 MCG (1000 UT) tablet, Take 1 tablet by mouth Daily., Disp: 90 tablet, Rfl: 1    Garlic 1000 MG capsule, Take  by mouth., Disp: , Rfl:     losartan (COZAAR) 50 MG " tablet, Take 1 tablet by mouth Daily., Disp: 90 tablet, Rfl: 1    montelukast (SINGULAIR) 10 MG tablet, Take 1 tablet by mouth Every Night., Disp: 90 tablet, Rfl: 1    multivitamin with minerals tablet tablet, Take 1 tablet by mouth Daily., Disp: , Rfl:     propranolol (INDERAL) 20 MG tablet, Take 1 tablet by mouth 2 (Two) Times a Day. HOLD rx until they request, Disp: 180 tablet, Rfl: 1    sertraline (ZOLOFT) 50 MG tablet, Take 1 tablet by mouth Every Morning., Disp: 90 tablet, Rfl: 1    simvastatin (Zocor) 20 MG tablet, Take 1 tablet by mouth Every Night., Disp: 90 tablet, Rfl: 1    Vitamin B Complex-C capsule, Take  by mouth., Disp: , Rfl:     MAGIC MOUTHWASH W/NYSTATIN 1/1/1/1, Swish and spit 5 mL 4 (Four) Times a Day., Disp: 120 mL, Rfl: 0    predniSONE (DELTASONE) 20 MG tablet, Take 3 tablets by mouth Daily for 1 day, THEN 2 tablets Daily for 2 days, THEN 1 tablet Daily for 2 days., Disp: 9 tablet, Rfl: 0   Past Medical History:   Diagnosis Date    Allergic rhinitis due to allergen 08/09/2018    Arthritis 05/05/2021    Essential hypertension 08/09/2018    Fatigue 11/02/2020    Hyperlipemia     Plantar fasciitis of right foot     Primary osteoarthritis of left knee 08/01/2017    Seasonal allergies     Shoulder pain, right     Stroke     RIGHT HAND WEAK occassional, BEFORE 2010    Tear of medial meniscus of left knee, current, initial encounter 08/01/2017    Trochanteric bursitis 08/24/2015      Physical Exam  Vitals reviewed.   Constitutional:       Appearance: Normal appearance. She is well-developed.   HENT:      Right Ear: Ear canal and external ear normal. A middle ear effusion is present.      Left Ear: Ear canal and external ear normal. A middle ear effusion is present.      Mouth/Throat:      Mouth: Mucous membranes are moist. Oral lesions present.      Pharynx: No pharyngeal swelling, oropharyngeal exudate or posterior oropharyngeal erythema.      Comments: Postnasal drainage noted.  Internal cheeks  with swollen white ulcers due to biting cheeks.    Neck:      Thyroid: No thyroid mass, thyromegaly or thyroid tenderness.   Cardiovascular:      Rate and Rhythm: Normal rate and regular rhythm.      Heart sounds: No murmur heard.    No friction rub. No gallop.   Pulmonary:      Effort: Pulmonary effort is normal.      Breath sounds: Normal breath sounds. No wheezing or rhonchi.   Feet:      Left foot:      Toenail Condition: Left toenails are abnormally thick and ingrown.      Comments: Left great toe with ingrown toenail.  Lymphadenopathy:      Cervical: No cervical adenopathy.   Skin:     General: Skin is warm and dry.   Neurological:      Mental Status: She is alert and oriented to person, place, and time.      Cranial Nerves: No cranial nerve deficit.   Psychiatric:         Mood and Affect: Mood and affect normal.         Behavior: Behavior normal.         Thought Content: Thought content normal. Thought content does not include homicidal or suicidal ideation.         Judgment: Judgment normal.      Result Review :   {The following data was reviewed by SERAFIN Keating    No Images in the past 120 days found..    POC COVID/FLU   Lab Results   Component Value Date    SARSANTIGEN Not Detected 09/08/2023    FLUAAG Not Detected 09/08/2023    FLUBAG Not Detected 09/08/2023    INTCT Passed 09/08/2023    LOTNUMBER [id number] 09/08/2023    EXPIRATDTE 3/22/24 09/08/2023                 Assessment and Plan    Diagnoses and all orders for this visit:    1. Upper respiratory infection with cough and congestion (Primary)  -     POCT SARS-CoV-2 Antigen JULIANE + Flu  -     predniSONE (DELTASONE) 20 MG tablet; Take 3 tablets by mouth Daily for 1 day, THEN 2 tablets Daily for 2 days, THEN 1 tablet Daily for 2 days.  Dispense: 9 tablet; Refill: 0    2. Mouth sores  -     MAGIC MOUTHWASH W/NYSTATIN 1/1/1/1; Swish and spit 5 mL 4 (Four) Times a Day.  Dispense: 120 mL; Refill: 0    3. Ingrown left greater toenail    1) I will  prescribe her prednisone dose pack, advised to continue OTC cough med as needed.     2) I will prescribe her Magic mouthwash to swish and spit.    3) advised her to call her podiatrist and make an appointment due to the ingrown toenail causing the pain.      Follow Up   Return if symptoms worsen or fail to improve.  Patient was given instructions and counseling regarding her condition or for health maintenance advice. Please see specific information pulled into the AVS if appropriate.     Parts of this note are electronic transcriptions/translations of spoken language to printed text using the Dragon Dictation system.      Justine Farfan, APRN  09/08/2023

## 2023-09-08 NOTE — TELEPHONE ENCOUNTER
Caller: JEFFREY    Relationship to patient: [employer]    Best call back number: [phone]    Patient is needing: CALLER STATED THAT THEY RECEIVED A COMPOUND AND THEY DO NOT DO THOSE ANYMORE. CALLER STATED THAT SOMETHING ELSE WILL NEED TO BE CALLED IN OR THAT [facility] DOES COMPOUNDS SO THAT IS AN OPTION AS WELL.

## 2023-09-28 ENCOUNTER — OFFICE VISIT (OUTPATIENT)
Dept: FAMILY MEDICINE CLINIC | Facility: CLINIC | Age: 78
End: 2023-09-28
Payer: MEDICARE

## 2023-09-28 VITALS
TEMPERATURE: 96.9 F | RESPIRATION RATE: 16 BRPM | WEIGHT: 177.6 LBS | HEART RATE: 77 BPM | SYSTOLIC BLOOD PRESSURE: 150 MMHG | DIASTOLIC BLOOD PRESSURE: 100 MMHG | HEIGHT: 62 IN | OXYGEN SATURATION: 94 % | BODY MASS INDEX: 32.68 KG/M2

## 2023-09-28 DIAGNOSIS — Z23 NEED FOR INFLUENZA VACCINATION: ICD-10-CM

## 2023-09-28 DIAGNOSIS — F41.1 GAD (GENERALIZED ANXIETY DISORDER): ICD-10-CM

## 2023-09-28 DIAGNOSIS — J30.89 ALLERGIC RHINITIS DUE TO OTHER ALLERGIC TRIGGER, UNSPECIFIED SEASONALITY: ICD-10-CM

## 2023-09-28 DIAGNOSIS — E55.9 VITAMIN D DEFICIENCY: ICD-10-CM

## 2023-09-28 DIAGNOSIS — J01.10 ACUTE NON-RECURRENT FRONTAL SINUSITIS: ICD-10-CM

## 2023-09-28 DIAGNOSIS — I10 ESSENTIAL HYPERTENSION: Primary | ICD-10-CM

## 2023-09-28 DIAGNOSIS — E78.2 MIXED HYPERLIPIDEMIA: ICD-10-CM

## 2023-09-28 LAB
ALBUMIN SERPL-MCNC: 4.4 G/DL (ref 3.5–5.2)
ALBUMIN/GLOB SERPL: 1.6 G/DL
ALP SERPL-CCNC: 87 U/L (ref 39–117)
ALT SERPL W P-5'-P-CCNC: 16 U/L (ref 1–33)
ANION GAP SERPL CALCULATED.3IONS-SCNC: 11.8 MMOL/L (ref 5–15)
AST SERPL-CCNC: 23 U/L (ref 1–32)
BASOPHILS # BLD AUTO: 0.03 10*3/MM3 (ref 0–0.2)
BASOPHILS NFR BLD AUTO: 0.3 % (ref 0–1.5)
BILIRUB SERPL-MCNC: 0.5 MG/DL (ref 0–1.2)
BUN SERPL-MCNC: 14 MG/DL (ref 8–23)
BUN/CREAT SERPL: 21.9 (ref 7–25)
CALCIUM SPEC-SCNC: 9.7 MG/DL (ref 8.6–10.5)
CHLORIDE SERPL-SCNC: 103 MMOL/L (ref 98–107)
CHOLEST SERPL-MCNC: 174 MG/DL (ref 0–200)
CO2 SERPL-SCNC: 24.2 MMOL/L (ref 22–29)
CREAT SERPL-MCNC: 0.64 MG/DL (ref 0.57–1)
DEPRECATED RDW RBC AUTO: 42.3 FL (ref 37–54)
EGFRCR SERPLBLD CKD-EPI 2021: 90.6 ML/MIN/1.73
EOSINOPHIL # BLD AUTO: 0.34 10*3/MM3 (ref 0–0.4)
EOSINOPHIL NFR BLD AUTO: 3.9 % (ref 0.3–6.2)
ERYTHROCYTE [DISTWIDTH] IN BLOOD BY AUTOMATED COUNT: 13.1 % (ref 12.3–15.4)
GLOBULIN UR ELPH-MCNC: 2.7 GM/DL
GLUCOSE SERPL-MCNC: 99 MG/DL (ref 65–99)
HCT VFR BLD AUTO: 40.8 % (ref 34–46.6)
HDLC SERPL-MCNC: 50 MG/DL (ref 40–60)
HGB BLD-MCNC: 13.6 G/DL (ref 12–15.9)
IMM GRANULOCYTES # BLD AUTO: 0.04 10*3/MM3 (ref 0–0.05)
IMM GRANULOCYTES NFR BLD AUTO: 0.5 % (ref 0–0.5)
LDLC SERPL CALC-MCNC: 104 MG/DL (ref 0–100)
LDLC/HDLC SERPL: 2.04 {RATIO}
LYMPHOCYTES # BLD AUTO: 2.56 10*3/MM3 (ref 0.7–3.1)
LYMPHOCYTES NFR BLD AUTO: 29.3 % (ref 19.6–45.3)
MCH RBC QN AUTO: 29.6 PG (ref 26.6–33)
MCHC RBC AUTO-ENTMCNC: 33.3 G/DL (ref 31.5–35.7)
MCV RBC AUTO: 88.7 FL (ref 79–97)
MONOCYTES # BLD AUTO: 0.61 10*3/MM3 (ref 0.1–0.9)
MONOCYTES NFR BLD AUTO: 7 % (ref 5–12)
NEUTROPHILS NFR BLD AUTO: 5.16 10*3/MM3 (ref 1.7–7)
NEUTROPHILS NFR BLD AUTO: 59 % (ref 42.7–76)
NRBC BLD AUTO-RTO: 0 /100 WBC (ref 0–0.2)
PLATELET # BLD AUTO: 251 10*3/MM3 (ref 140–450)
PMV BLD AUTO: 10.8 FL (ref 6–12)
POTASSIUM SERPL-SCNC: 4.3 MMOL/L (ref 3.5–5.2)
PROT SERPL-MCNC: 7.1 G/DL (ref 6–8.5)
RBC # BLD AUTO: 4.6 10*6/MM3 (ref 3.77–5.28)
SODIUM SERPL-SCNC: 139 MMOL/L (ref 136–145)
TRIGL SERPL-MCNC: 110 MG/DL (ref 0–150)
TSH SERPL DL<=0.05 MIU/L-ACNC: 1.9 UIU/ML (ref 0.27–4.2)
VLDLC SERPL-MCNC: 20 MG/DL (ref 5–40)
WBC NRBC COR # BLD: 8.74 10*3/MM3 (ref 3.4–10.8)

## 2023-09-28 PROCEDURE — 85025 COMPLETE CBC W/AUTO DIFF WBC: CPT | Performed by: NURSE PRACTITIONER

## 2023-09-28 PROCEDURE — 80053 COMPREHEN METABOLIC PANEL: CPT | Performed by: NURSE PRACTITIONER

## 2023-09-28 PROCEDURE — 84443 ASSAY THYROID STIM HORMONE: CPT | Performed by: NURSE PRACTITIONER

## 2023-09-28 PROCEDURE — 82306 VITAMIN D 25 HYDROXY: CPT | Performed by: NURSE PRACTITIONER

## 2023-09-28 PROCEDURE — 80061 LIPID PANEL: CPT | Performed by: NURSE PRACTITIONER

## 2023-09-28 RX ORDER — MELATONIN
1000 DAILY
Qty: 90 TABLET | Refills: 1 | Status: SHIPPED | OUTPATIENT
Start: 2023-09-28

## 2023-09-28 RX ORDER — ATORVASTATIN CALCIUM 20 MG/1
20 TABLET, FILM COATED ORAL DAILY
Qty: 90 TABLET | Refills: 1 | Status: SHIPPED | OUTPATIENT
Start: 2023-09-28

## 2023-09-28 RX ORDER — CETIRIZINE HYDROCHLORIDE 10 MG/1
10 TABLET ORAL DAILY
Qty: 90 TABLET | Refills: 1 | Status: SHIPPED | OUTPATIENT
Start: 2023-09-28

## 2023-09-28 RX ORDER — LACTOBACILLUS ACIDOPHILUS 20B CELL
1 CAPSULE ORAL DAILY
Qty: 30 CAPSULE | Refills: 0 | Status: SHIPPED | OUTPATIENT
Start: 2023-09-28

## 2023-09-28 RX ORDER — AMOXICILLIN AND CLAVULANATE POTASSIUM 875; 125 MG/1; MG/1
1 TABLET, FILM COATED ORAL 2 TIMES DAILY
Qty: 20 TABLET | Refills: 0 | Status: SHIPPED | OUTPATIENT
Start: 2023-09-28 | End: 2023-10-08

## 2023-09-28 RX ORDER — SIMVASTATIN 20 MG
20 TABLET ORAL NIGHTLY
Qty: 90 TABLET | Refills: 1 | Status: CANCELLED | OUTPATIENT
Start: 2023-09-28

## 2023-09-28 RX ORDER — LOSARTAN POTASSIUM 50 MG/1
50 TABLET ORAL 2 TIMES DAILY
Qty: 180 TABLET | Refills: 1 | Status: SHIPPED | OUTPATIENT
Start: 2023-09-28

## 2023-09-28 RX ORDER — MONTELUKAST SODIUM 10 MG/1
10 TABLET ORAL NIGHTLY
Qty: 90 TABLET | Refills: 1 | Status: SHIPPED | OUTPATIENT
Start: 2023-09-28

## 2023-09-28 RX ORDER — PROPRANOLOL HYDROCHLORIDE 20 MG/1
20 TABLET ORAL 2 TIMES DAILY
Qty: 180 TABLET | Refills: 1 | Status: SHIPPED | OUTPATIENT
Start: 2023-09-28

## 2023-09-28 RX ORDER — AMLODIPINE BESYLATE 2.5 MG/1
2.5 TABLET ORAL DAILY
Qty: 90 TABLET | Refills: 1 | Status: SHIPPED | OUTPATIENT
Start: 2023-09-28

## 2023-09-28 NOTE — PROGRESS NOTES
Chief Complaint  Hypertension, Hyperlipidemia, and Depression    Subjective          Yeimy Yang presents to [facility]  History of Present Illness  She  and her  are heading to [place] for the winter.  They have a provider there if they need to be seen.    HTN: She is currently on amlodipine doing well.  She is on losartan for blood pressure.  She took her meds today.    LIPID: She is on simvastatin  OA: She is doing well overall.  She is very active.  She takes her calcium and her vitamin D.  DEPRESSION/MICHI: She is on Zoloft and doing well.  Sinusitis:  She has had sinus pressure for about 3 weeks.  She did take the steroids that Justine gave her though they caused her belly to be upset and she finished them out but she is still having a ton of sinus congestion especially in the back of her throat.  She has had a lot of pressure in her face.  And she has been very tired.  She is not somebody that is tired she is on the go kind of person.    Depression: Not at risk    PHQ-2 Score: 0    and 4/12/2023               Allergies  Patient has no known allergies.    Social History     Tobacco Use    Smoking status: Never    Smokeless tobacco: Never   Vaping Use    Vaping Use: Never used   Substance Use Topics    Alcohol use: Never    Drug use: Never       Family History   Problem Relation Age of Onset    Heart disease Mother     Arthritis Mother     Heart disease Father     Cancer Brother     Lung cancer Other     Malig Hyperthermia Neg Hx         Health Maintenance Due   Topic Date Due    COVID-19 Vaccine (6 - 2023-24 season) 09/01/2023        Immunization History   Administered Date(s) Administered    COVID-19 (MODERNA) 1st,2nd,3rd Dose Monovalent 04/13/2021, 05/11/2021    COVID-19 (MODERNA) BIVALENT 12+YRS 09/30/2022    COVID-19 (MODERNA) Monovalent Original Booster 11/21/2021    COVID-19 (PFIZER) BIVALENT 12+YRS 04/12/2023    Fluzone High-Dose 65+yrs 10/08/2021,  "09/28/2023    Influenza, Unspecified 10/09/2020    Pneumococcal Conjugate 13-Valent (PCV13) 01/27/2016    Pneumococcal Polysaccharide (PPSV23) 10/27/2021    Shingrix 05/13/2019, 07/18/2019    TD Preservative Free (Tenivac) 07/09/2019    Td (TDVAX) 12/16/1996    Tdap 06/13/2012    Zostavax 10/17/2013    Zoster, Unspecified 05/13/2019, 07/22/2019       Review of Systems   Constitutional:  Positive for fatigue.   HENT:  Positive for congestion, postnasal drip, sinus pressure and sore throat.    Respiratory:  Negative for cough and shortness of breath.    Cardiovascular:  Negative for chest pain.   Gastrointestinal:  Negative for diarrhea, nausea and vomiting.      Objective       Vitals:    09/28/23 1008 09/28/23 1015   BP: 153/96 150/100   Pulse: 77    Resp: 16    Temp: 96.9 °F (36.1 °C)    SpO2: 94%    Weight: 80.6 kg (177 lb 9.6 oz)    Height: 156.2 cm (61.5\")        Body mass index is 33.01 kg/m².         Physical Exam  Vitals reviewed.   Constitutional:       Appearance: Normal appearance. She is well-developed.   HENT:      Right Ear: Tympanic membrane and ear canal normal.      Left Ear: Tympanic membrane and ear canal normal.      Nose: Congestion and rhinorrhea present.      Mouth/Throat:      Pharynx: No oropharyngeal exudate.   Eyes:      Conjunctiva/sclera: Conjunctivae normal.   Cardiovascular:      Rate and Rhythm: Normal rate and regular rhythm.      Heart sounds: Normal heart sounds. No murmur heard.  Pulmonary:      Effort: Pulmonary effort is normal.      Breath sounds: Normal breath sounds. No wheezing or rhonchi.   Neurological:      Mental Status: She is alert and oriented to person, place, and time.      Cranial Nerves: No cranial nerve deficit.      Motor: No weakness.   Psychiatric:         Mood and Affect: Mood and affect normal.           Result Review :     The following data was reviewed by: SERAFIN Gross on 09/28/2023:    Common Labs   Common labs          4/12/2023    15:49 "   Common Labs   Glucose 125    BUN 18    Creatinine 0.85    Sodium 139    Potassium 3.5    Chloride 104    Calcium 9.8    Albumin 4.5    Total Bilirubin <0.2    Alkaline Phosphatase 95    AST (SGOT) 19    ALT (SGPT) 19    WBC 8.77    Hemoglobin 13.4    Hematocrit 40.5    Platelets 259    Total Cholesterol 164    Triglycerides 185    HDL Cholesterol 46    LDL Cholesterol  86                     Assessment and Plan      Diagnoses and all orders for this visit:    1. Essential hypertension (Primary)  -     Comprehensive Metabolic Panel  -     CBC & Differential  -     TSH  -     Lipid Panel  -     amLODIPine (NORVASC) 2.5 MG tablet; Take 1 tablet by mouth Daily.  Dispense: 90 tablet; Refill: 1  -     losartan (COZAAR) 50 MG tablet; Take 1 tablet by mouth 2 (Two) Times a Day.  Dispense: 180 tablet; Refill: 1  -     propranolol (INDERAL) 20 MG tablet; Take 1 tablet by mouth 2 (Two) Times a Day. HOLD rx until they request  Dispense: 180 tablet; Refill: 1    2. Allergic rhinitis due to other allergic trigger, unspecified seasonality  -     cetirizine (EQ Allergy Relief, Cetirizine,) 10 MG tablet; Take 1 tablet by mouth Daily.  Dispense: 90 tablet; Refill: 1  -     montelukast (SINGULAIR) 10 MG tablet; Take 1 tablet by mouth Every Night.  Dispense: 90 tablet; Refill: 1    3. Vitamin D deficiency  -     cholecalciferol (Vitamin D, Cholecalciferol,) 25 MCG (1000 UT) tablet; Take 1 tablet by mouth Daily.  Dispense: 90 tablet; Refill: 1  -     Vitamin D,25-Hydroxy    4. MICHI (generalized anxiety disorder)  -     propranolol (INDERAL) 20 MG tablet; Take 1 tablet by mouth 2 (Two) Times a Day. HOLD rx until they request  Dispense: 180 tablet; Refill: 1  -     sertraline (ZOLOFT) 50 MG tablet; Take 1 tablet by mouth Every Morning.  Dispense: 90 tablet; Refill: 1    5. Mixed hyperlipidemia  -     atorvastatin (LIPITOR) 20 MG tablet; Take 1 tablet by mouth Daily.  Dispense: 90 tablet; Refill: 1    6. Acute non-recurrent frontal  sinusitis  -     amoxicillin-clavulanate (AUGMENTIN) 875-125 MG per tablet; Take 1 tablet by mouth 2 (Two) Times a Day for 10 days.  Dispense: 20 tablet; Refill: 0  -     Lactobacillus (Florajen Acidophilus) capsule; Take 1 capsule by mouth Daily.  Dispense: 30 capsule; Refill: 0    7. Need for influenza vaccination  -     Fluzone High-Dose 65+yrs            Follow Up     Return in about 6 months (around 3/28/2024) for Medicare Wellness 04/12/2024.  We will increase the losartan but I feel that it is due to her being sick and we may need to go back down to 1 a day vs 2 after she finishes the abx.  Check BP at [facility] or her neighbors.   I will change her simvastatin to Lipitor with the conflict with amlodipine.  She will keep me posted on her blood pressure.  She will take the antibiotics with the probiotic for the sinuses.  Patient was given instructions and counseling regarding her condition or for health maintenance advice. Please see specific information pulled into the AVS if appropriate.     Parts of this note are electronic transcriptions/translations of spoken language to printed text using the Dragon Dictation system.          Anna Caldwell, APRN  09/28/2023

## 2023-09-29 LAB — 25(OH)D3 SERPL-MCNC: 54.9 NG/ML (ref 30–100)

## 2023-10-13 ENCOUNTER — TELEPHONE (OUTPATIENT)
Dept: FAMILY MEDICINE CLINIC | Facility: CLINIC | Age: 78
End: 2023-10-13

## 2023-10-13 NOTE — TELEPHONE ENCOUNTER
After speaking with patient it was the antibiotic that gave her diarrhea. Pharm had not given her probiotic to take along side with antibiotic. She is going to  probiotic and let us know Monday is diarrhea has not improved. Advised to push lots of fluids.

## 2023-10-13 NOTE — TELEPHONE ENCOUNTER
Caller: Yeimy Yang    Relationship: Self    Best call back number: [phone]    What is the best time to reach you: ANYTIME     Who are you requesting to speak with (clinical staff, provider,  specific staff member): CLINICAL        What was the call regarding:       THE PATIENT SAID SHE HAS BEEN HAVING DIARRHEA SINCE SHE WAS PRESCRIBED 3 NEW MEDICATIONS. SHE IS WANTING TO KNOW WHAT SHOULD BE DONE       PLEASE CALL PATIENT

## 2024-01-30 ENCOUNTER — TELEPHONE (OUTPATIENT)
Dept: FAMILY MEDICINE CLINIC | Facility: CLINIC | Age: 79
End: 2024-01-30

## 2024-01-30 NOTE — TELEPHONE ENCOUNTER
Caller: Yeimy Yang    Relationship: Self    Best call back number: [phone]     What is the best time to reach you: ANYTIME     Who are you requesting to speak with (clinical staff, provider,  specific staff member): CLINICAL     What was the call regarding: PATIENT IS CALLING TO CONFIRM IF UP TO DATE, ON HER SHOTS AND VACCINES, PLEASE CONFIRM.

## 2024-03-23 DIAGNOSIS — I10 ESSENTIAL HYPERTENSION: ICD-10-CM

## 2024-03-23 DIAGNOSIS — E78.2 MIXED HYPERLIPIDEMIA: ICD-10-CM

## 2024-03-25 RX ORDER — LOSARTAN POTASSIUM 50 MG/1
50 TABLET ORAL 2 TIMES DAILY
Qty: 60 TABLET | Refills: 0 | Status: SHIPPED | OUTPATIENT
Start: 2024-03-25

## 2024-03-25 RX ORDER — ATORVASTATIN CALCIUM 20 MG/1
20 TABLET, FILM COATED ORAL DAILY
Qty: 30 TABLET | Refills: 0 | Status: SHIPPED | OUTPATIENT
Start: 2024-03-25

## 2024-04-21 DIAGNOSIS — E78.2 MIXED HYPERLIPIDEMIA: ICD-10-CM

## 2024-04-21 DIAGNOSIS — I10 ESSENTIAL HYPERTENSION: ICD-10-CM

## 2024-04-22 RX ORDER — LOSARTAN POTASSIUM 50 MG/1
50 TABLET ORAL 2 TIMES DAILY
Qty: 60 TABLET | Refills: 0 | OUTPATIENT
Start: 2024-04-22

## 2024-04-22 RX ORDER — ATORVASTATIN CALCIUM 20 MG/1
20 TABLET, FILM COATED ORAL DAILY
Qty: 30 TABLET | Refills: 0 | OUTPATIENT
Start: 2024-04-22

## 2024-04-25 ENCOUNTER — OFFICE VISIT (OUTPATIENT)
Dept: FAMILY MEDICINE CLINIC | Facility: CLINIC | Age: 79
End: 2024-04-25
Payer: MEDICARE

## 2024-04-25 VITALS
OXYGEN SATURATION: 96 % | TEMPERATURE: 96.7 F | HEIGHT: 62 IN | HEART RATE: 63 BPM | BODY MASS INDEX: 33.64 KG/M2 | SYSTOLIC BLOOD PRESSURE: 140 MMHG | RESPIRATION RATE: 18 BRPM | DIASTOLIC BLOOD PRESSURE: 82 MMHG | WEIGHT: 182.8 LBS

## 2024-04-25 DIAGNOSIS — I10 ESSENTIAL HYPERTENSION: ICD-10-CM

## 2024-04-25 DIAGNOSIS — Z12.31 SCREENING MAMMOGRAM FOR BREAST CANCER: ICD-10-CM

## 2024-04-25 DIAGNOSIS — E55.9 VITAMIN D DEFICIENCY: ICD-10-CM

## 2024-04-25 DIAGNOSIS — Z00.00 MEDICARE ANNUAL WELLNESS VISIT, SUBSEQUENT: Primary | ICD-10-CM

## 2024-04-25 DIAGNOSIS — J30.89 ALLERGIC RHINITIS DUE TO OTHER ALLERGIC TRIGGER, UNSPECIFIED SEASONALITY: ICD-10-CM

## 2024-04-25 DIAGNOSIS — F41.1 GAD (GENERALIZED ANXIETY DISORDER): ICD-10-CM

## 2024-04-25 DIAGNOSIS — R01.1 MURMUR: ICD-10-CM

## 2024-04-25 DIAGNOSIS — E78.2 MIXED HYPERLIPIDEMIA: ICD-10-CM

## 2024-04-25 DIAGNOSIS — Z78.0 POSTMENOPAUSAL: ICD-10-CM

## 2024-04-25 LAB
25(OH)D3 SERPL-MCNC: 46.8 NG/ML (ref 30–100)
ALBUMIN SERPL-MCNC: 4.8 G/DL (ref 3.5–5.2)
ALBUMIN/GLOB SERPL: 1.9 G/DL
ALP SERPL-CCNC: 99 U/L (ref 39–117)
ALT SERPL W P-5'-P-CCNC: 22 U/L (ref 1–33)
ANION GAP SERPL CALCULATED.3IONS-SCNC: 10 MMOL/L (ref 5–15)
AST SERPL-CCNC: 18 U/L (ref 1–32)
BASOPHILS # BLD AUTO: 0.04 10*3/MM3 (ref 0–0.2)
BASOPHILS NFR BLD AUTO: 0.5 % (ref 0–1.5)
BILIRUB SERPL-MCNC: 0.4 MG/DL (ref 0–1.2)
BUN SERPL-MCNC: 13 MG/DL (ref 8–23)
BUN/CREAT SERPL: 20.6 (ref 7–25)
CALCIUM SPEC-SCNC: 9.6 MG/DL (ref 8.6–10.5)
CHLORIDE SERPL-SCNC: 105 MMOL/L (ref 98–107)
CHOLEST SERPL-MCNC: 157 MG/DL (ref 0–200)
CO2 SERPL-SCNC: 25 MMOL/L (ref 22–29)
CREAT SERPL-MCNC: 0.63 MG/DL (ref 0.57–1)
DEPRECATED RDW RBC AUTO: 40.9 FL (ref 37–54)
EGFRCR SERPLBLD CKD-EPI 2021: 90.4 ML/MIN/1.73
EOSINOPHIL # BLD AUTO: 0.31 10*3/MM3 (ref 0–0.4)
EOSINOPHIL NFR BLD AUTO: 3.6 % (ref 0.3–6.2)
ERYTHROCYTE [DISTWIDTH] IN BLOOD BY AUTOMATED COUNT: 13 % (ref 12.3–15.4)
GLOBULIN UR ELPH-MCNC: 2.5 GM/DL
GLUCOSE SERPL-MCNC: 95 MG/DL (ref 65–99)
HCT VFR BLD AUTO: 41.3 % (ref 34–46.6)
HDLC SERPL-MCNC: 50 MG/DL (ref 40–60)
HGB BLD-MCNC: 13.7 G/DL (ref 12–15.9)
IMM GRANULOCYTES # BLD AUTO: 0.04 10*3/MM3 (ref 0–0.05)
IMM GRANULOCYTES NFR BLD AUTO: 0.5 % (ref 0–0.5)
LDLC SERPL CALC-MCNC: 86 MG/DL (ref 0–100)
LDLC/HDLC SERPL: 1.66 {RATIO}
LYMPHOCYTES # BLD AUTO: 2.45 10*3/MM3 (ref 0.7–3.1)
LYMPHOCYTES NFR BLD AUTO: 28.3 % (ref 19.6–45.3)
MCH RBC QN AUTO: 28.8 PG (ref 26.6–33)
MCHC RBC AUTO-ENTMCNC: 33.2 G/DL (ref 31.5–35.7)
MCV RBC AUTO: 86.9 FL (ref 79–97)
MONOCYTES # BLD AUTO: 0.59 10*3/MM3 (ref 0.1–0.9)
MONOCYTES NFR BLD AUTO: 6.8 % (ref 5–12)
NEUTROPHILS NFR BLD AUTO: 5.23 10*3/MM3 (ref 1.7–7)
NEUTROPHILS NFR BLD AUTO: 60.3 % (ref 42.7–76)
NRBC BLD AUTO-RTO: 0 /100 WBC (ref 0–0.2)
PLATELET # BLD AUTO: 244 10*3/MM3 (ref 140–450)
PMV BLD AUTO: 10.6 FL (ref 6–12)
POTASSIUM SERPL-SCNC: 4.5 MMOL/L (ref 3.5–5.2)
PROT SERPL-MCNC: 7.3 G/DL (ref 6–8.5)
RBC # BLD AUTO: 4.75 10*6/MM3 (ref 3.77–5.28)
SODIUM SERPL-SCNC: 140 MMOL/L (ref 136–145)
TRIGL SERPL-MCNC: 119 MG/DL (ref 0–150)
TSH SERPL DL<=0.05 MIU/L-ACNC: 1.83 UIU/ML (ref 0.27–4.2)
VLDLC SERPL-MCNC: 21 MG/DL (ref 5–40)
WBC NRBC COR # BLD AUTO: 8.66 10*3/MM3 (ref 3.4–10.8)

## 2024-04-25 PROCEDURE — 80061 LIPID PANEL: CPT | Performed by: NURSE PRACTITIONER

## 2024-04-25 PROCEDURE — 85025 COMPLETE CBC W/AUTO DIFF WBC: CPT | Performed by: NURSE PRACTITIONER

## 2024-04-25 PROCEDURE — 82306 VITAMIN D 25 HYDROXY: CPT | Performed by: NURSE PRACTITIONER

## 2024-04-25 PROCEDURE — 84443 ASSAY THYROID STIM HORMONE: CPT | Performed by: NURSE PRACTITIONER

## 2024-04-25 PROCEDURE — 80053 COMPREHEN METABOLIC PANEL: CPT | Performed by: NURSE PRACTITIONER

## 2024-04-25 RX ORDER — TERBINAFINE HYDROCHLORIDE 250 MG/1
TABLET ORAL
COMMUNITY
Start: 2024-03-04

## 2024-04-25 RX ORDER — TERBINAFINE HYDROCHLORIDE 250 MG/1
TABLET ORAL
COMMUNITY
Start: 2024-01-03

## 2024-04-25 RX ORDER — TERBINAFINE HYDROCHLORIDE 250 MG/1
TABLET ORAL
Status: CANCELLED | OUTPATIENT
Start: 2024-04-25

## 2024-04-25 RX ORDER — CETIRIZINE HYDROCHLORIDE 10 MG/1
10 TABLET ORAL DAILY
Qty: 90 TABLET | Refills: 1 | Status: SHIPPED | OUTPATIENT
Start: 2024-04-25

## 2024-04-25 RX ORDER — ATORVASTATIN CALCIUM 20 MG/1
20 TABLET, FILM COATED ORAL DAILY
Qty: 90 TABLET | Refills: 1 | Status: SHIPPED | OUTPATIENT
Start: 2024-04-25

## 2024-04-25 RX ORDER — AMLODIPINE BESYLATE 2.5 MG/1
2.5 TABLET ORAL DAILY
Qty: 90 TABLET | Refills: 1 | Status: SHIPPED | OUTPATIENT
Start: 2024-04-25

## 2024-04-25 RX ORDER — MELATONIN
1000 DAILY
Qty: 90 TABLET | Refills: 1 | Status: SHIPPED | OUTPATIENT
Start: 2024-04-25

## 2024-04-25 RX ORDER — LOSARTAN POTASSIUM 50 MG/1
50 TABLET ORAL 2 TIMES DAILY
Qty: 180 TABLET | Refills: 1 | Status: SHIPPED | OUTPATIENT
Start: 2024-04-25

## 2024-04-25 RX ORDER — PROPRANOLOL HYDROCHLORIDE 20 MG/1
20 TABLET ORAL 2 TIMES DAILY
Qty: 180 TABLET | Refills: 1 | Status: SHIPPED | OUTPATIENT
Start: 2024-04-25

## 2024-04-25 RX ORDER — MONTELUKAST SODIUM 10 MG/1
10 TABLET ORAL NIGHTLY
Qty: 90 TABLET | Refills: 1 | Status: SHIPPED | OUTPATIENT
Start: 2024-04-25

## 2024-04-25 NOTE — PROGRESS NOTES
The ABCs of the Annual Wellness Visit  Subsequent Medicare Wellness Visit    Subjective    Yeimy Yang is a 79 y.o. female who presents for a Subsequent Medicare Wellness Visit.    The following portions of the patient's history were reviewed and   updated as appropriate: allergies, current medications, past family history, past medical history, past social history, past surgical history, and problem list.    Compared to one year ago, the patient feels her physical   health is the same.    Compared to one year ago, the patient feels her mental   health is the same.    Recent Hospitalizations:  She was not admitted to the hospital during the last year.       Current Medical Providers:  Patient Care Team:  Anna Caldwell APRN as PCP - General (Nurse Practitioner)    Outpatient Medications Prior to Visit   Medication Sig Dispense Refill    ascorbic acid (VITAMIN C) 500 MG tablet Take 1 tablet by mouth Daily.      aspirin 81 MG chewable tablet Chew 1 tablet Daily.      calcium carbonate (OS-ALEXA) 600 MG tablet Take 1 tablet by mouth Daily.      Garlic 1000 MG capsule Take  by mouth.      multivitamin with minerals tablet tablet Take 1 tablet by mouth Daily.      terbinafine (lamiSIL) 250 MG tablet Satrt when cefdinir is complete ,  1 po qd 2 months  complete lab and then take 3 rd month      Vitamin B Complex-C capsule Take  by mouth.      amLODIPine (NORVASC) 2.5 MG tablet Take 1 tablet by mouth Daily. 90 tablet 1    atorvastatin (LIPITOR) 20 MG tablet Take 1 tablet by mouth once daily 30 tablet 0    cetirizine (EQ Allergy Relief, Cetirizine,) 10 MG tablet Take 1 tablet by mouth Daily. 90 tablet 1    cholecalciferol (Vitamin D, Cholecalciferol,) 25 MCG (1000 UT) tablet Take 1 tablet by mouth Daily. 90 tablet 1    losartan (COZAAR) 50 MG tablet Take 1 tablet by mouth twice daily 60 tablet 0    montelukast (SINGULAIR) 10 MG tablet Take 1 tablet by mouth Every Night. 90 tablet 1    propranolol (INDERAL) 20  "MG tablet Take 1 tablet by mouth 2 (Two) Times a Day. HOLD rx until they request 180 tablet 1    sertraline (ZOLOFT) 50 MG tablet Take 1 tablet by mouth Every Morning. 90 tablet 1    Lactobacillus (Florajen Acidophilus) capsule Take 1 capsule by mouth Daily. (Patient not taking: Reported on 4/25/2024) 30 capsule 0    terbinafine (lamiSIL) 250 MG tablet  (Patient not taking: Reported on 4/25/2024)       No facility-administered medications prior to visit.       No opioid medication identified on active medication list. I have reviewed chart for other potential  high risk medication/s and harmful drug interactions in the elderly.        Aspirin is on active medication list. Aspirin use is indicated based on review of current medical condition/s. Pros and cons of this therapy have been discussed today. Benefits of this medication outweigh potential harm.  Patient has been encouraged to continue taking this medication.  .      Patient Active Problem List   Diagnosis    Knee osteoarthritis    Arthritis    Allergic rhinitis due to allergen    Carpal tunnel syndrome    Essential hypertension    Fatigue    Greater trochanteric bursitis    Hyperlipidemia    Arthritis    Osteoarthritis of knee    Left knee pain    Aftercare following left knee joint replacement surgery     Advance Care Planning   Advance Care Planning     Advance Directive is on file.  ACP discussion was declined by the patient. Patient has an advance directive in EMR which is still valid.      Objective    Vitals:    04/25/24 1005 04/25/24 1020   BP: 151/88 140/82   Pulse: 63    Resp: 18    Temp: 96.7 °F (35.9 °C)    SpO2: 96%    Weight: 82.9 kg (182 lb 12.8 oz)    Height: 156.2 cm (61.5\")      Estimated body mass index is 33.98 kg/m² as calculated from the following:    Height as of this encounter: 156.2 cm (61.5\").    Weight as of this encounter: 82.9 kg (182 lb 12.8 oz).    BMI is >= 30 and <35. (Class 1 Obesity). The following options were offered after " discussion;: exercise counseling/recommendations and nutrition counseling/recommendations      Does the patient have evidence of cognitive impairment? No    Lab Results   Component Value Date    TRIG 119 2024    HDL 50 2024    LDL 86 2024    VLDL 21 2024        HEALTH RISK ASSESSMENT    Smoking Status:  Social History     Tobacco Use   Smoking Status Never   Smokeless Tobacco Never     Alcohol Consumption:  Social History     Substance and Sexual Activity   Alcohol Use Never     Fall Risk Screen:    STEADI Fall Risk Assessment was completed, and patient is at LOW risk for falls.Assessment completed on:2024    Depression Screenin/25/2024    10:09 AM   PHQ-2/PHQ-9 Depression Screening   Little Interest or Pleasure in Doing Things 0-->not at all   Feeling Down, Depressed or Hopeless 0-->not at all   PHQ-9: Brief Depression Severity Measure Score 0       Health Habits and Functional and Cognitive Screenin/25/2024    10:07 AM   Functional & Cognitive Status   Do you have difficulty preparing food and eating? No   Do you have difficulty bathing yourself, getting dressed or grooming yourself? No   Do you have difficulty using the toilet? No   Do you have difficulty moving around from place to place? No   Do you have trouble with steps or getting out of a bed or a chair? No   Current Diet Well Balanced Diet   Dental Exam Not up to date   Eye Exam Up to date        Eye Exam Comment Dr Madden   Exercise (times per week) 0 times per week   Current Exercises Include No Regular Exercise   Do you need help using the phone?  No   Are you deaf or do you have serious difficulty hearing?  Yes   Do you need help to go to places out of walking distance? No   Do you need help shopping? No   Do you need help preparing meals?  No   Do you need help with housework?  No   Do you need help with laundry? No   Do you need help taking your medications? No   Do you need help managing money? No   Do  you ever drive or ride in a car without wearing a seat belt? No   Have you felt unusual stress, anger or loneliness in the last month? No   Who do you live with? Spouse   If you need help, do you have trouble finding someone available to you? No   Have you been bothered in the last four weeks by sexual problems? No   Do you have difficulty concentrating, remembering or making decisions? Yes       Age-appropriate Screening Schedule:  Refer to the list below for future screening recommendations based on patient's age, sex and/or medical conditions. Orders for these recommended tests are listed in the plan section. The patient has been provided with a written plan.    Health Maintenance   Topic Date Due    COVID-19 Vaccine (7 - 2023-24 season) 07/15/2024 (Originally 9/1/2023)    RSV Vaccine - Adults (1 - 1-dose 60+ series) 04/25/2025 (Originally 1/26/2005)    INFLUENZA VACCINE  08/01/2024    DXA SCAN  09/15/2024    ANNUAL WELLNESS VISIT  04/25/2025    LIPID PANEL  04/25/2025    BMI FOLLOWUP  04/25/2025    COLORECTAL CANCER SCREENING  06/16/2026    TDAP/TD VACCINES (4 - Td or Tdap) 07/09/2029    HEPATITIS C SCREENING  Completed    Pneumococcal Vaccine 65+  Completed    ZOSTER VACCINE  Completed                  CMS Preventative Services Quick Reference  Risk Factors Identified During Encounter  Fall Risk-High or Moderate: Discussed Fall Prevention in the home  Immunizations Discussed/Encouraged: Tdap, Influenza, Prevnar 20 (Pneumococcal 20-valent conjugate), Shingrix, COVID19, and RSV (Respiratory Syncytial Virus)  Dental Screening Recommended  Vision Screening Recommended  The above risks/problems have been discussed with the patient.  Pertinent information has been shared with the patient in the After Visit Summary.  An After Visit Summary and PPPS were made available to the patient.    Follow Up:   Next Medicare Wellness visit to be scheduled in 1 year.       Additional E&M Note during same encounter  "follows:  Patient has multiple medical problems which are significant and separately identifiable that require additional work above and beyond the Medicare Wellness Visit.      Chief Complaint  Medicare Wellness-subsequent, Hypertension, and Depression    Subjective        HPI  Yeimy Yang is also being seen today for   HTN: She is currently on amlodipine doing soso.  She is on losartan for blood pressure.  She took her meds today.  She has her BP monitors in [place] and will pick one up today.  LIPID: She is on simvastatin  OA: She is doing well overall.  She is very active.  She takes her calcium and her vitamin D.  DEPRESSION/MICHI: She is on Zoloft and doing well.  When she was in [place] she did get treated for sores in the mouth but she is doing better.  Review of Systems   Constitutional:  Negative for fatigue.   Respiratory:  Negative for cough and shortness of breath.    Cardiovascular:  Negative for chest pain and leg swelling.   Gastrointestinal:  Negative for nausea and vomiting.   Psychiatric/Behavioral:  Negative for hallucinations and suicidal ideas.        Objective   Vital Signs:  /82   Pulse 63   Temp 96.7 °F (35.9 °C)   Resp 18   Ht 156.2 cm (61.5\")   Wt 82.9 kg (182 lb 12.8 oz)   SpO2 96%   BMI 33.98 kg/m²     Physical Exam  Vitals reviewed.   Constitutional:       Appearance: Normal appearance. She is well-developed.   Cardiovascular:      Rate and Rhythm: Normal rate and regular rhythm.      Heart sounds: Normal heart sounds. No murmur heard.  Pulmonary:      Effort: Pulmonary effort is normal.      Breath sounds: Normal breath sounds.   Neurological:      Mental Status: She is alert and oriented to person, place, and time.      Cranial Nerves: No cranial nerve deficit.      Motor: No weakness.   Psychiatric:         Mood and Affect: Mood and affect normal.          The following data was reviewed by: SERAFIN Gross on 04/25/2024:  Common labs          3/1/2024 "    09:18 3/3/2024    11:32 4/25/2024    10:43   Common Labs   Glucose 103      95    BUN 16      13    Creatinine 0.7      0.63    Sodium 143      140    Potassium 4.5      4.5    Chloride 105      105    Calcium 9.8      9.6    Albumin 4.4      4.8    Total Bilirubin 0.4     Negative     0.4    Alkaline Phosphatase 97      99    AST (SGOT) 19      18    ALT (SGPT) 18      22    WBC 8.5      8.66    Hemoglobin 13.5      13.7    Hematocrit 40.1      41.3    Platelets 225      244    Total Cholesterol   157    Triglycerides   119    HDL Cholesterol   50    LDL Cholesterol    86       Details          This result is from an external source.                        Assessment and Plan   Diagnoses and all orders for this visit:    1. Medicare annual wellness visit, subsequent (Primary)    2. Essential hypertension  -     Comprehensive Metabolic Panel  -     CBC & Differential  -     TSH  -     Lipid Panel  -     amLODIPine (NORVASC) 2.5 MG tablet; Take 1 tablet by mouth Daily.  Dispense: 90 tablet; Refill: 1  -     losartan (COZAAR) 50 MG tablet; Take 1 tablet by mouth 2 (Two) Times a Day.  Dispense: 180 tablet; Refill: 1  -     propranolol (INDERAL) 20 MG tablet; Take 1 tablet by mouth 2 (Two) Times a Day.  Dispense: 180 tablet; Refill: 1    3. Mixed hyperlipidemia  -     Comprehensive Metabolic Panel  -     CBC & Differential  -     TSH  -     Lipid Panel  -     atorvastatin (LIPITOR) 20 MG tablet; Take 1 tablet by mouth Daily.  Dispense: 90 tablet; Refill: 1    4. Allergic rhinitis due to other allergic trigger, unspecified seasonality  -     cetirizine (EQ Allergy Relief, Cetirizine,) 10 MG tablet; Take 1 tablet by mouth Daily.  Dispense: 90 tablet; Refill: 1  -     montelukast (SINGULAIR) 10 MG tablet; Take 1 tablet by mouth Every Night.  Dispense: 90 tablet; Refill: 1    5. Vitamin D deficiency  -     cholecalciferol (Vitamin D, Cholecalciferol,) 25 MCG (1000 UT) tablet; Take 1 tablet by mouth Daily.  Dispense: 90  tablet; Refill: 1  -     Vitamin D,25-Hydroxy    6. MICHI (generalized anxiety disorder)  -     propranolol (INDERAL) 20 MG tablet; Take 1 tablet by mouth 2 (Two) Times a Day.  Dispense: 180 tablet; Refill: 1  -     sertraline (ZOLOFT) 50 MG tablet; Take 1 tablet by mouth Every Morning.  Dispense: 90 tablet; Refill: 1    7. Postmenopausal  -     DEXA Bone Density Axial; Future    8. Screening mammogram for breast cancer  -     Mammo Screening Digital Tomosynthesis Bilateral With CAD; Future    9. Murmur  -     Adult Transthoracic Echo Complete W/ Cont if Necessary Per Protocol; Future             Follow Up   No follow-ups on file.  Patient was given instructions and counseling regarding her condition or for health maintenance advice. Please see specific information pulled into the AVS if appropriate.     Follow-up in 6 months for labs and appt. Call with any concerns or questions that you may have regarding your medications or history.    I have reviewed all medications and at this time no medications changes need to be adjusted for all chronic conditions.

## 2024-05-02 ENCOUNTER — PATIENT ROUNDING (BHMG ONLY) (OUTPATIENT)
Dept: FAMILY MEDICINE CLINIC | Facility: CLINIC | Age: 79
End: 2024-05-02
Payer: MEDICARE

## 2024-05-28 ENCOUNTER — OFFICE VISIT (OUTPATIENT)
Dept: FAMILY MEDICINE CLINIC | Facility: CLINIC | Age: 79
End: 2024-05-28
Payer: MEDICARE

## 2024-05-28 VITALS
WEIGHT: 182 LBS | DIASTOLIC BLOOD PRESSURE: 82 MMHG | HEART RATE: 76 BPM | OXYGEN SATURATION: 96 % | TEMPERATURE: 97.5 F | SYSTOLIC BLOOD PRESSURE: 130 MMHG | HEIGHT: 62 IN | BODY MASS INDEX: 33.49 KG/M2

## 2024-05-28 DIAGNOSIS — E66.9 CLASS 1 OBESITY WITH SERIOUS COMORBIDITY AND BODY MASS INDEX (BMI) OF 33.0 TO 33.9 IN ADULT, UNSPECIFIED OBESITY TYPE: Primary | ICD-10-CM

## 2024-05-28 PROCEDURE — 3079F DIAST BP 80-89 MM HG: CPT | Performed by: NURSE PRACTITIONER

## 2024-05-28 PROCEDURE — G2211 COMPLEX E/M VISIT ADD ON: HCPCS | Performed by: NURSE PRACTITIONER

## 2024-05-28 PROCEDURE — 3075F SYST BP GE 130 - 139MM HG: CPT | Performed by: NURSE PRACTITIONER

## 2024-05-28 PROCEDURE — 1160F RVW MEDS BY RX/DR IN RCRD: CPT | Performed by: NURSE PRACTITIONER

## 2024-05-28 PROCEDURE — 99213 OFFICE O/P EST LOW 20 MIN: CPT | Performed by: NURSE PRACTITIONER

## 2024-05-28 PROCEDURE — 1159F MED LIST DOCD IN RCRD: CPT | Performed by: NURSE PRACTITIONER

## 2024-05-28 NOTE — PROGRESS NOTES
Chief Complaint  discuss weight loss medications     Subjective          Yeimy Yang presents to [facility]  History of Present Illness  Patient is here with a weight loss supplement from [employer] as she found it on Facebook with Mica Small she had and she said that when she went to do it was only get to be $39 for a month supply to trial it but when she got it she actually got 3 months worth medicine/supplement and a pill of over $300.  When she tried calling she never got an answer.  She wants to know if she should take it or not.    Depression: Not at risk (5/28/2024)    PHQ-2     PHQ-2 Score: 0    and 5/28/2024               Allergies  Patient has no known allergies.    Social History     Tobacco Use    Smoking status: Never    Smokeless tobacco: Never   Vaping Use    Vaping status: Never Used   Substance Use Topics    Alcohol use: Never    Drug use: Never       Family History   Problem Relation Age of Onset    Heart disease Mother     Arthritis Mother     Heart disease Father     Cancer Brother     Lung cancer Other     Malig Hyperthermia Neg Hx         There are no preventive care reminders to display for this patient.     Immunization History   Administered Date(s) Administered    COVID-19 (MODERNA) 1st,2nd,3rd Dose Monovalent 04/13/2021, 05/11/2021, 11/11/2021    COVID-19 (MODERNA) BIVALENT 12+YRS 09/30/2022    COVID-19 (MODERNA) Monovalent Original Booster 11/21/2021    COVID-19 (PFIZER) BIVALENT 12+YRS 04/12/2023    Fluzone High Dose =>65 Years ([employer] ONLY) 11/22/2019    Fluzone High-Dose 65+yrs 10/08/2021, 09/30/2022, 09/28/2023    Influenza, Unspecified 10/09/2020    Pneumococcal Conjugate 13-Valent (PCV13) 01/27/2016    Pneumococcal Polysaccharide (PPSV23) 10/27/2021    Shingrix 05/13/2019, 07/18/2019    TD Preservative Free (Tenivac) 07/09/2019    Td (TDVAX) 12/16/1996    Tdap 06/13/2012    Zostavax 10/17/2013    Zoster, Unspecified 05/13/2019,  "07/22/2019       Review of Systems   Constitutional:  Negative for fatigue.   Respiratory:  Negative for cough and shortness of breath.    Cardiovascular:  Negative for chest pain.   Gastrointestinal:  Negative for diarrhea, nausea and vomiting.        Objective       Vitals:    05/28/24 1524 05/28/24 1543   BP: 160/90 130/82   BP Location: Left arm    Patient Position: Sitting    Cuff Size: Adult    Pulse: 76    Temp: 97.5 °F (36.4 °C)    SpO2: 96%    Weight: 82.6 kg (182 lb)    Height: 156.2 cm (61.5\")        Body mass index is 33.83 kg/m².         Physical Exam  Constitutional:       Appearance: Normal appearance.   HENT:      Head: Normocephalic.   Pulmonary:      Effort: Pulmonary effort is normal.   Skin:     Findings: No bruising.   Neurological:      General: No focal deficit present.      Mental Status: She is alert and oriented to person, place, and time.   Psychiatric:         Mood and Affect: Mood normal.         Behavior: Behavior normal.         Thought Content: Thought content normal.         Judgment: Judgment normal.               Result Review :     The following data was reviewed by: SERAFIN Gross on 05/28/2024:            No Images in the past 120 days found..              Assessment and Plan      Assessment & Plan  Class 1 obesity with serious comorbidity and body mass index (BMI) of 33.0 to 33.9 in adult, unspecified obesity type  I discussed in depth that I am not certain that I can tell her what is potentially in the medicine except for the active ingredients that I am reading on the bottle.  That she will use truly do smell like apple cider vinegar.  The others smelled like herbal supplement which what ever he is in the medicine per the bottle is all laxative or extra fiber based.  Discussed that I do not know if right necessarily would take the herbal supplement but the apple cider vinegar will not hurt her.  Discussed to have her daughter try to reach out via email and phone " and even call the [facility] [employer].  Call with questions or concerns.              Diagnosis Plan   1. Class 1 obesity with serious comorbidity and body mass index (BMI) of 33.0 to 33.9 in adult, unspecified obesity type                  Follow Up     Return if symptoms worsen or fail to improve.    Patient was given instructions and counseling regarding her condition or for health maintenance advice. Please see specific information pulled into the AVS if appropriate.     Parts of this note are electronic transcriptions/translations of spoken language to printed text using the Dragon Dictation system.          Anna Caldwell, APRN  05/28/2024

## 2024-06-03 ENCOUNTER — HOSPITAL ENCOUNTER (OUTPATIENT)
Dept: CARDIOLOGY | Facility: HOSPITAL | Age: 79
Discharge: HOME | End: 2024-06-03
Payer: MEDICARE

## 2024-06-03 ENCOUNTER — HOSPITAL ENCOUNTER (OUTPATIENT)
Dept: MAMMOGRAPHY | Facility: HOSPITAL | Age: 79
Discharge: HOME | End: 2024-06-03
Payer: MEDICARE

## 2024-06-03 DIAGNOSIS — R01.1 MURMUR: ICD-10-CM

## 2024-06-03 DIAGNOSIS — Z12.31 SCREENING MAMMOGRAM FOR BREAST CANCER: ICD-10-CM

## 2024-06-03 PROCEDURE — 93306 TTE W/DOPPLER COMPLETE: CPT

## 2024-06-03 PROCEDURE — 77063 BREAST TOMOSYNTHESIS BI: CPT

## 2024-06-03 PROCEDURE — 77067 SCR MAMMO BI INCL CAD: CPT

## 2024-06-04 LAB
BH CV ECHO MEAS - AO ROOT DIAM: 2.9 CM
BH CV ECHO MEAS - EDV(CUBED): 65.9 ML
BH CV ECHO MEAS - EDV(MOD-SP2): 46.9 ML
BH CV ECHO MEAS - EDV(MOD-SP4): 60.4 ML
BH CV ECHO MEAS - EF(MOD-BP): 62 %
BH CV ECHO MEAS - EF(MOD-SP2): 58.2 %
BH CV ECHO MEAS - EF(MOD-SP4): 63.2 %
BH CV ECHO MEAS - ESV(CUBED): 11 ML
BH CV ECHO MEAS - ESV(MOD-SP2): 19.6 ML
BH CV ECHO MEAS - ESV(MOD-SP4): 22.2 ML
BH CV ECHO MEAS - FS: 45 %
BH CV ECHO MEAS - IVS/LVPW: 1 CM
BH CV ECHO MEAS - IVSD: 1.11 CM
BH CV ECHO MEAS - LA DIMENSION: 3.5 CM
BH CV ECHO MEAS - LAT PEAK E' VEL: 9 CM/SEC
BH CV ECHO MEAS - LV DIASTOLIC VOL/BSA (35-75): 33.4 CM2
BH CV ECHO MEAS - LV MASS(C)D: 149.7 GRAMS
BH CV ECHO MEAS - LV SYSTOLIC VOL/BSA (12-30): 12.3 CM2
BH CV ECHO MEAS - LVIDD: 4 CM
BH CV ECHO MEAS - LVIDS: 2.22 CM
BH CV ECHO MEAS - LVOT AREA: 3.2 CM2
BH CV ECHO MEAS - LVOT DIAM: 2.03 CM
BH CV ECHO MEAS - LVPWD: 1.11 CM
BH CV ECHO MEAS - MED PEAK E' VEL: 6.9 CM/SEC
BH CV ECHO MEAS - MV A MAX VEL: 98.3 CM/SEC
BH CV ECHO MEAS - MV DEC SLOPE: 359.4 CM/SEC2
BH CV ECHO MEAS - MV DEC TIME: 0.26 SEC
BH CV ECHO MEAS - MV E MAX VEL: 93 CM/SEC
BH CV ECHO MEAS - MV E/A: 0.95
BH CV ECHO MEAS - RVDD: 3 CM
BH CV ECHO MEAS - SV(MOD-SP2): 27.3 ML
BH CV ECHO MEAS - SV(MOD-SP4): 38.2 ML
BH CV ECHO MEAS - SVI(MOD-SP2): 15.1 ML/M2
BH CV ECHO MEAS - SVI(MOD-SP4): 21.1 ML/M2
BH CV ECHO MEAS - TR MAX PG: 23.4 MMHG
BH CV ECHO MEAS - TR MAX VEL: 241.6 CM/SEC
BH CV ECHO MEASUREMENTS AVERAGE E/E' RATIO: 11.7

## 2024-10-15 ENCOUNTER — HOSPITAL ENCOUNTER (OUTPATIENT)
Dept: BONE DENSITY | Facility: HOSPITAL | Age: 79
Discharge: HOME | End: 2024-10-15
Admitting: NURSE PRACTITIONER
Payer: MEDICARE

## 2024-10-15 DIAGNOSIS — Z78.0 POSTMENOPAUSAL: ICD-10-CM

## 2024-10-15 PROCEDURE — 77080 DXA BONE DENSITY AXIAL: CPT

## 2024-10-17 DIAGNOSIS — M85.80 OSTEOPENIA, UNSPECIFIED LOCATION: Primary | ICD-10-CM

## 2024-10-17 DIAGNOSIS — M85.89 OSTEOPENIA OF MULTIPLE SITES: ICD-10-CM

## 2024-10-19 DIAGNOSIS — E78.2 MIXED HYPERLIPIDEMIA: ICD-10-CM

## 2024-10-21 DIAGNOSIS — J30.89 ALLERGIC RHINITIS DUE TO OTHER ALLERGIC TRIGGER, UNSPECIFIED SEASONALITY: ICD-10-CM

## 2024-10-21 RX ORDER — ATORVASTATIN CALCIUM 20 MG/1
20 TABLET, FILM COATED ORAL DAILY
Qty: 90 TABLET | Refills: 0 | OUTPATIENT
Start: 2024-10-21

## 2024-10-21 RX ORDER — MONTELUKAST SODIUM 10 MG/1
10 TABLET ORAL NIGHTLY
Qty: 90 TABLET | Refills: 0 | OUTPATIENT
Start: 2024-10-21

## 2024-10-23 DIAGNOSIS — E55.9 VITAMIN D DEFICIENCY: ICD-10-CM

## 2024-10-23 RX ORDER — CHOLECALCIFEROL (VITAMIN D3) 25 MCG
1000 TABLET ORAL DAILY
Qty: 90 TABLET | Refills: 0 | Status: SHIPPED | OUTPATIENT
Start: 2024-10-23 | End: 2024-10-28 | Stop reason: SDUPTHER

## 2024-10-26 DIAGNOSIS — E78.2 MIXED HYPERLIPIDEMIA: ICD-10-CM

## 2024-10-26 DIAGNOSIS — J30.89 ALLERGIC RHINITIS DUE TO OTHER ALLERGIC TRIGGER, UNSPECIFIED SEASONALITY: ICD-10-CM

## 2024-10-28 ENCOUNTER — OFFICE VISIT (OUTPATIENT)
Dept: FAMILY MEDICINE CLINIC | Facility: CLINIC | Age: 79
End: 2024-10-28
Payer: MEDICARE

## 2024-10-28 VITALS
TEMPERATURE: 96.2 F | BODY MASS INDEX: 33.72 KG/M2 | OXYGEN SATURATION: 97 % | HEART RATE: 70 BPM | DIASTOLIC BLOOD PRESSURE: 85 MMHG | SYSTOLIC BLOOD PRESSURE: 123 MMHG | HEIGHT: 62 IN

## 2024-10-28 DIAGNOSIS — E78.2 MIXED HYPERLIPIDEMIA: ICD-10-CM

## 2024-10-28 DIAGNOSIS — F41.1 GAD (GENERALIZED ANXIETY DISORDER): ICD-10-CM

## 2024-10-28 DIAGNOSIS — R09.89 RUNNY NOSE: Primary | ICD-10-CM

## 2024-10-28 DIAGNOSIS — E55.9 VITAMIN D DEFICIENCY: ICD-10-CM

## 2024-10-28 DIAGNOSIS — R41.3 MEMORY CHANGE: ICD-10-CM

## 2024-10-28 DIAGNOSIS — I10 ESSENTIAL HYPERTENSION: ICD-10-CM

## 2024-10-28 DIAGNOSIS — J30.89 ALLERGIC RHINITIS DUE TO OTHER ALLERGIC TRIGGER, UNSPECIFIED SEASONALITY: ICD-10-CM

## 2024-10-28 DIAGNOSIS — M85.89 OSTEOPENIA OF MULTIPLE SITES: ICD-10-CM

## 2024-10-28 PROCEDURE — 87428 SARSCOV & INF VIR A&B AG IA: CPT | Performed by: NURSE PRACTITIONER

## 2024-10-28 PROCEDURE — 3074F SYST BP LT 130 MM HG: CPT | Performed by: NURSE PRACTITIONER

## 2024-10-28 PROCEDURE — 1160F RVW MEDS BY RX/DR IN RCRD: CPT | Performed by: NURSE PRACTITIONER

## 2024-10-28 PROCEDURE — 1159F MED LIST DOCD IN RCRD: CPT | Performed by: NURSE PRACTITIONER

## 2024-10-28 PROCEDURE — 1126F AMNT PAIN NOTED NONE PRSNT: CPT | Performed by: NURSE PRACTITIONER

## 2024-10-28 PROCEDURE — 99214 OFFICE O/P EST MOD 30 MIN: CPT | Performed by: NURSE PRACTITIONER

## 2024-10-28 PROCEDURE — 3079F DIAST BP 80-89 MM HG: CPT | Performed by: NURSE PRACTITIONER

## 2024-10-28 PROCEDURE — G2211 COMPLEX E/M VISIT ADD ON: HCPCS | Performed by: NURSE PRACTITIONER

## 2024-10-28 RX ORDER — CHOLECALCIFEROL (VITAMIN D3) 25 MCG
1000 TABLET ORAL DAILY
Qty: 90 TABLET | Refills: 1 | Status: SHIPPED | OUTPATIENT
Start: 2024-10-28

## 2024-10-28 RX ORDER — MONTELUKAST SODIUM 10 MG/1
10 TABLET ORAL NIGHTLY
Qty: 90 TABLET | Refills: 0 | OUTPATIENT
Start: 2024-10-28

## 2024-10-28 RX ORDER — CETIRIZINE HYDROCHLORIDE 10 MG/1
10 TABLET ORAL DAILY
Qty: 90 TABLET | Refills: 1 | Status: SHIPPED | OUTPATIENT
Start: 2024-10-28

## 2024-10-28 RX ORDER — MONTELUKAST SODIUM 10 MG/1
10 TABLET ORAL NIGHTLY
Qty: 90 TABLET | Refills: 1 | Status: SHIPPED | OUTPATIENT
Start: 2024-10-28

## 2024-10-28 RX ORDER — LOSARTAN POTASSIUM 50 MG/1
50 TABLET ORAL 2 TIMES DAILY
Qty: 180 TABLET | Refills: 1 | Status: SHIPPED | OUTPATIENT
Start: 2024-10-28

## 2024-10-28 RX ORDER — AMLODIPINE BESYLATE 2.5 MG/1
2.5 TABLET ORAL DAILY
Qty: 90 TABLET | Refills: 1 | Status: SHIPPED | OUTPATIENT
Start: 2024-10-28

## 2024-10-28 RX ORDER — ATORVASTATIN CALCIUM 20 MG/1
20 TABLET, FILM COATED ORAL DAILY
Qty: 90 TABLET | Refills: 1 | Status: SHIPPED | OUTPATIENT
Start: 2024-10-28

## 2024-10-28 RX ORDER — ATORVASTATIN CALCIUM 20 MG/1
20 TABLET, FILM COATED ORAL DAILY
Qty: 90 TABLET | Refills: 0 | OUTPATIENT
Start: 2024-10-28

## 2024-10-28 RX ORDER — PROPRANOLOL HCL 20 MG
20 TABLET ORAL 2 TIMES DAILY
Qty: 180 TABLET | Refills: 1 | Status: SHIPPED | OUTPATIENT
Start: 2024-10-28

## 2024-10-28 NOTE — PROGRESS NOTES
Chief Complaint  Hypertension, Hyperlipidemia, and Nasal Congestion (Started 2 weeks ago )    Subjective          Yeimy Yang presents to [facility]  History of Present Illness  She is heading back to [place] on Friday and will be there till April.  Her doctor down in [place] is Dr. Correa.  She does have a follow-up with him soon and I did discuss that he will be able to see my blood work that we did this week.  She will go tomorrow and get her blood work.  She is going Wednesday to get her first Prolia injection and we will do the second 1 when she comes back in April.  She has had a runny nose some cough and congestion  HTN: She is currently on amlodipine doing good.  She is on losartan for blood pressure.  She took her meds today.  She has her BP monitors in [place] and will pick one up today.  LIPID: She is on simvastatin and needs labs  OA: She is doing well overall.  She is very active.  She takes her calcium and her vitamin D.  DEPRESSION/MICHI: She is on Zoloft and doing well.  She is also doing the Inderal to help with anxiety and blood pressure which it is doing well.  She said that she is having some more memory issues.  She said that if she does not write it down then she sometimes will forget.  She does not want to do any further testing as they are heading down to [place] on Friday.  She has had a runny nose cough and congestion just over the last couple weeks.  She is going to see her sister-in-law and wants to make sure that she is not sick.  She does not feel bad she just has the drainage.  No fevers noted.  No nausea vomiting.             Allergies  Patient has no known allergies.    Social History     Tobacco Use    Smoking status: Never    Smokeless tobacco: Never   Vaping Use    Vaping status: Never Used   Substance Use Topics    Alcohol use: Never    Drug use: Never       Family History   Problem Relation Age of Onset    Heart disease Mother      "Arthritis Mother     Heart disease Father     Cancer Brother     Lung cancer Other     Malig Hyperthermia Neg Hx         Health Maintenance Due   Topic Date Due    COVID-19 Vaccine (7 - 2023-24 season) 09/01/2024        Immunization History   Administered Date(s) Administered    COVID-19 (MODERNA) 1st,2nd,3rd Dose Monovalent 04/13/2021, 05/11/2021, 11/11/2021    COVID-19 (MODERNA) BIVALENT 12+YRS 09/30/2022    COVID-19 (MODERNA) Monovalent Original Booster 11/21/2021    COVID-19 (PFIZER) BIVALENT 12+YRS 04/12/2023    Fluzone High-Dose 65+YRS 11/22/2019, 10/07/2020    Fluzone High-Dose 65+yrs 10/08/2021, 09/30/2022, 09/28/2023    Influenza, Unspecified 10/09/2020    Pneumococcal Conjugate 13-Valent (PCV13) 01/27/2016    Pneumococcal Polysaccharide (PPSV23) 10/27/2021    Shingrix 05/13/2019, 07/18/2019    TD Preservative Free (Tenivac) 07/09/2019    Td (TDVAX) 12/16/1996    Td, Unspecified 12/16/1996    Tdap 06/13/2012    Zostavax 10/17/2013    Zoster, Unspecified 10/17/2013, 05/13/2019, 07/22/2019       Review of Systems   Constitutional:  Negative for chills, fatigue and fever.   HENT:  Positive for congestion, postnasal drip, rhinorrhea, sinus pressure and sore throat.    Respiratory:  Positive for cough. Negative for shortness of breath.    Cardiovascular:  Negative for chest pain.   Gastrointestinal:  Negative for diarrhea, nausea and vomiting.   Skin:  Negative for rash.        Objective       Vitals:    10/28/24 1011   BP: 123/85   BP Location: Left arm   Patient Position: Sitting   Cuff Size: Adult   Pulse: 70   Temp: 96.2 °F (35.7 °C)   SpO2: 97%   Height: 156.2 cm (61.5\")       Body mass index is 33.72 kg/m².         Physical Exam  Vitals reviewed.   Constitutional:       Appearance: Normal appearance. She is well-developed.   HENT:      Right Ear: Tympanic membrane and ear canal normal.      Left Ear: Tympanic membrane and ear canal normal.      Nose: Rhinorrhea present. No congestion.      Mouth/Throat:    "   Pharynx: No oropharyngeal exudate or posterior oropharyngeal erythema.   Eyes:      Conjunctiva/sclera: Conjunctivae normal.   Cardiovascular:      Rate and Rhythm: Normal rate and regular rhythm.      Heart sounds: Normal heart sounds. No murmur heard.  Pulmonary:      Effort: Pulmonary effort is normal.      Breath sounds: Normal breath sounds. No wheezing or rhonchi.   Neurological:      Mental Status: She is alert and oriented to person, place, and time.      Cranial Nerves: No cranial nerve deficit.      Motor: No weakness.   Psychiatric:         Mood and Affect: Mood and affect normal.               Result Review :     The following data was reviewed by: SERAFIN Gross on 10/28/2024:    Common Labs   Common labs          3/1/2024    09:18 3/3/2024    11:32 4/25/2024    10:43   Common Labs   Glucose 103      95    BUN 16      13    Creatinine 0.7      0.63    Sodium 143      140    Potassium 4.5      4.5    Chloride 105      105    Calcium 9.8      9.6    Albumin 4.4      4.8    Total Bilirubin 0.4     Negative     0.4    Alkaline Phosphatase 97      99    AST (SGOT) 19      18    ALT (SGPT) 18      22    WBC 8.5      8.66    Hemoglobin 13.5      13.7    Hematocrit 40.1      41.3    Platelets 225      244    Total Cholesterol   157    Triglycerides   119    HDL Cholesterol   50    LDL Cholesterol    86       Details          This result is from an external source.             POC COVID/FLU   Lab Results   Component Value Date    SARSANTIGEN Not Detected 10/28/2024    FLUAAG Not Detected 10/28/2024    FLUBAG Not Detected 10/28/2024    INTCT Passed 10/28/2024    LOTNUMBER [id number] 10/28/2024    EXPIRATDTE 9/4/25 10/28/2024            DEXA Bone Density Axial    Result Date: 10/15/2024  Impression: Osteopenia - Based upon the FRAX score, this patient does meet criteria for initiation of pharmacological treatment recommendations for prevention of osteoporosis per the National Osteoporosis Foundation  (NOF) guidelines. However, individualized treatment decisions should be made accounting for additional clinical factors. Report dictated by: Rani Fernando  I have personally reviewed this case and agree with the findings above: Electronically Signed: Raffi Mijares MD  10/15/2024 1:23 PM EDT  Workstation ID: [id number]                Assessment and Plan      Assessment & Plan  Allergic rhinitis due to other allergic trigger, unspecified seasonality  We did discuss to push fluids.  Essential hypertension    MICHI (generalized anxiety disorder)    Mixed hyperlipidemia     Vitamin D deficiency    Runny nose    Osteopenia of multiple sites  We will start Prolia.  It was recommended with her DEXA to start medication for the prevention of osteoporosis.  Memory change  She declines to do any further testing right now.  She does have an appointment with Dr. Correa coming up.    Orders Placed This Encounter   Procedures    Comprehensive Metabolic Panel    Lipid Panel    TSH    Vitamin D,25-Hydroxy    POCT SARS-CoV-2 Antigen JULIANE + Flu    CBC w AUTO Differential     New Medications Ordered This Visit   Medications    cetirizine (EQ Allergy Relief, Cetirizine,) 10 MG tablet     Sig: Take 1 tablet by mouth Daily.     Dispense:  90 tablet     Refill:  1    montelukast (SINGULAIR) 10 MG tablet     Sig: Take 1 tablet by mouth Every Night.     Dispense:  90 tablet     Refill:  1    amLODIPine (NORVASC) 2.5 MG tablet     Sig: Take 1 tablet by mouth Daily.     Dispense:  90 tablet     Refill:  1    losartan (COZAAR) 50 MG tablet     Sig: Take 1 tablet by mouth 2 (Two) Times a Day.     Dispense:  180 tablet     Refill:  1    propranolol (INDERAL) 20 MG tablet     Sig: Take 1 tablet by mouth 2 (Two) Times a Day.     Dispense:  180 tablet     Refill:  1    sertraline (ZOLOFT) 50 MG tablet     Sig: Take 1 tablet by mouth Every Morning.     Dispense:  90 tablet     Refill:  1    atorvastatin (LIPITOR) 20 MG tablet     Sig: Take 1 tablet  by mouth Daily.     Dispense:  90 tablet     Refill:  1    cholecalciferol (VITAMIN D3) 25 MCG (1000 UT) tablet     Sig: Take 1 tablet by mouth Daily.     Dispense:  90 tablet     Refill:  1     I did discuss with the patient that [place] should be able to see all of my lab results and my notes.  I gave her the card with my name and name of the office so that he will be able to see him.     Diagnosis Plan   1. Runny nose  POCT SARS-CoV-2 Antigen JULIANE + Flu      2. Allergic rhinitis due to other allergic trigger, unspecified seasonality  cetirizine (EQ Allergy Relief, Cetirizine,) 10 MG tablet    montelukast (SINGULAIR) 10 MG tablet      3. Essential hypertension  amLODIPine (NORVASC) 2.5 MG tablet    losartan (COZAAR) 50 MG tablet    propranolol (INDERAL) 20 MG tablet    Comprehensive Metabolic Panel    Lipid Panel    TSH    CBC w AUTO Differential      4. MICHI (generalized anxiety disorder)  propranolol (INDERAL) 20 MG tablet    sertraline (ZOLOFT) 50 MG tablet      5. Mixed hyperlipidemia  atorvastatin (LIPITOR) 20 MG tablet    Comprehensive Metabolic Panel    Lipid Panel    TSH    CBC w AUTO Differential      6. Vitamin D deficiency  cholecalciferol (VITAMIN D3) 25 MCG (1000 UT) tablet    Vitamin D,25-Hydroxy      7. Osteopenia of multiple sites        8. Memory change                  Follow Up     Return in about 6 months (around 4/28/2025) for Medicare Wellness.  They declined to make a 6-month as they do not know exactly the day and time that they will be home from [place] I did do 6 months worth of medication.  I did send them down to [facility] as they will be there on Friday.  Patient was given instructions and counseling regarding her condition or for health maintenance advice. Please see specific information pulled into the AVS if appropriate.     Parts of this note are electronic transcriptions/translations of spoken language to printed text using the Dragon Dictation system.          Anna  Javi, APRN  10/28/2024

## 2024-10-29 ENCOUNTER — LAB (OUTPATIENT)
Dept: LAB | Facility: HOSPITAL | Age: 79
End: 2024-10-29
Payer: MEDICARE

## 2024-10-29 DIAGNOSIS — I10 ESSENTIAL HYPERTENSION: ICD-10-CM

## 2024-10-29 DIAGNOSIS — E55.9 VITAMIN D DEFICIENCY: ICD-10-CM

## 2024-10-29 DIAGNOSIS — J30.89 ALLERGIC RHINITIS DUE TO OTHER ALLERGIC TRIGGER, UNSPECIFIED SEASONALITY: ICD-10-CM

## 2024-10-29 DIAGNOSIS — M85.89 OSTEOPENIA OF MULTIPLE SITES: ICD-10-CM

## 2024-10-29 DIAGNOSIS — E78.2 MIXED HYPERLIPIDEMIA: ICD-10-CM

## 2024-10-29 LAB
25(OH)D3 SERPL-MCNC: 43.1 NG/ML (ref 30–100)
ALBUMIN SERPL-MCNC: 4.2 G/DL (ref 3.5–5.2)
ALBUMIN/GLOB SERPL: 1.4 G/DL
ALP SERPL-CCNC: 114 U/L (ref 39–117)
ALT SERPL W P-5'-P-CCNC: 15 U/L (ref 1–33)
ANION GAP SERPL CALCULATED.3IONS-SCNC: 11.7 MMOL/L (ref 5–15)
AST SERPL-CCNC: 19 U/L (ref 1–32)
BASOPHILS # BLD AUTO: 0.06 10*3/MM3 (ref 0–0.2)
BASOPHILS NFR BLD AUTO: 0.5 % (ref 0–1.5)
BILIRUB SERPL-MCNC: 0.5 MG/DL (ref 0–1.2)
BUN SERPL-MCNC: 17 MG/DL (ref 8–23)
BUN/CREAT SERPL: 23.3 (ref 7–25)
CALCIUM SPEC-SCNC: 9.3 MG/DL (ref 8.6–10.5)
CHLORIDE SERPL-SCNC: 106 MMOL/L (ref 98–107)
CHOLEST SERPL-MCNC: 153 MG/DL (ref 0–200)
CO2 SERPL-SCNC: 23.3 MMOL/L (ref 22–29)
CREAT SERPL-MCNC: 0.73 MG/DL (ref 0.57–1)
DEPRECATED RDW RBC AUTO: 42.9 FL (ref 37–54)
EGFRCR SERPLBLD CKD-EPI 2021: 83.8 ML/MIN/1.73
EOSINOPHIL # BLD AUTO: 0.28 10*3/MM3 (ref 0–0.4)
EOSINOPHIL NFR BLD AUTO: 2.4 % (ref 0.3–6.2)
ERYTHROCYTE [DISTWIDTH] IN BLOOD BY AUTOMATED COUNT: 13.1 % (ref 12.3–15.4)
GLOBULIN UR ELPH-MCNC: 3 GM/DL
GLUCOSE SERPL-MCNC: 113 MG/DL (ref 65–99)
HCT VFR BLD AUTO: 40.6 % (ref 34–46.6)
HDLC SERPL-MCNC: 49 MG/DL (ref 40–60)
HGB BLD-MCNC: 13.4 G/DL (ref 12–15.9)
IMM GRANULOCYTES # BLD AUTO: 0.06 10*3/MM3 (ref 0–0.05)
IMM GRANULOCYTES NFR BLD AUTO: 0.5 % (ref 0–0.5)
LDLC SERPL CALC-MCNC: 88 MG/DL (ref 0–100)
LDLC/HDLC SERPL: 1.77 {RATIO}
LYMPHOCYTES # BLD AUTO: 2.6 10*3/MM3 (ref 0.7–3.1)
LYMPHOCYTES NFR BLD AUTO: 22.5 % (ref 19.6–45.3)
MAGNESIUM SERPL-MCNC: 2 MG/DL (ref 1.6–2.4)
MCH RBC QN AUTO: 29.5 PG (ref 26.6–33)
MCHC RBC AUTO-ENTMCNC: 33 G/DL (ref 31.5–35.7)
MCV RBC AUTO: 89.4 FL (ref 79–97)
MONOCYTES # BLD AUTO: 0.74 10*3/MM3 (ref 0.1–0.9)
MONOCYTES NFR BLD AUTO: 6.4 % (ref 5–12)
NEUTROPHILS NFR BLD AUTO: 67.7 % (ref 42.7–76)
NEUTROPHILS NFR BLD AUTO: 7.79 10*3/MM3 (ref 1.7–7)
NRBC BLD AUTO-RTO: 0 /100 WBC (ref 0–0.2)
PHOSPHATE SERPL-MCNC: 3.4 MG/DL (ref 2.5–4.5)
PLATELET # BLD AUTO: 259 10*3/MM3 (ref 140–450)
PMV BLD AUTO: 10.3 FL (ref 6–12)
POTASSIUM SERPL-SCNC: 4.1 MMOL/L (ref 3.5–5.2)
PROT SERPL-MCNC: 7.2 G/DL (ref 6–8.5)
RBC # BLD AUTO: 4.54 10*6/MM3 (ref 3.77–5.28)
SODIUM SERPL-SCNC: 141 MMOL/L (ref 136–145)
TRIGL SERPL-MCNC: 87 MG/DL (ref 0–150)
TSH SERPL DL<=0.05 MIU/L-ACNC: 3.13 UIU/ML (ref 0.27–4.2)
VLDLC SERPL-MCNC: 16 MG/DL (ref 5–40)
WBC NRBC COR # BLD AUTO: 11.53 10*3/MM3 (ref 3.4–10.8)

## 2024-10-29 PROCEDURE — 84100 ASSAY OF PHOSPHORUS: CPT

## 2024-10-29 PROCEDURE — 82306 VITAMIN D 25 HYDROXY: CPT

## 2024-10-29 PROCEDURE — 80053 COMPREHEN METABOLIC PANEL: CPT

## 2024-10-29 PROCEDURE — 83735 ASSAY OF MAGNESIUM: CPT

## 2024-10-29 PROCEDURE — 85025 COMPLETE CBC W/AUTO DIFF WBC: CPT

## 2024-10-29 PROCEDURE — 84443 ASSAY THYROID STIM HORMONE: CPT

## 2024-10-29 PROCEDURE — 80061 LIPID PANEL: CPT

## 2024-10-29 PROCEDURE — 36415 COLL VENOUS BLD VENIPUNCTURE: CPT

## 2024-10-29 RX ORDER — MONTELUKAST SODIUM 10 MG/1
10 TABLET ORAL NIGHTLY
Qty: 90 TABLET | Refills: 0 | OUTPATIENT
Start: 2024-10-29

## 2024-10-29 NOTE — ASSESSMENT & PLAN NOTE
We will start Prolia.  It was recommended with her DEXA to start medication for the prevention of osteoporosis.

## 2024-10-30 ENCOUNTER — HOSPITAL ENCOUNTER (OUTPATIENT)
Dept: INFUSION THERAPY | Facility: HOSPITAL | Age: 79
Discharge: HOME | End: 2024-10-30
Admitting: NURSE PRACTITIONER
Payer: MEDICARE

## 2024-10-30 VITALS
WEIGHT: 180.56 LBS | HEART RATE: 79 BPM | RESPIRATION RATE: 20 BRPM | SYSTOLIC BLOOD PRESSURE: 144 MMHG | BODY MASS INDEX: 33.23 KG/M2 | DIASTOLIC BLOOD PRESSURE: 78 MMHG | OXYGEN SATURATION: 97 % | TEMPERATURE: 98.4 F | HEIGHT: 62 IN

## 2024-10-30 DIAGNOSIS — M85.89 OSTEOPENIA OF MULTIPLE SITES: Primary | ICD-10-CM

## 2024-10-30 PROCEDURE — 96372 THER/PROPH/DIAG INJ SC/IM: CPT

## 2024-10-30 PROCEDURE — 25010000002 DENOSUMAB 60 MG/ML SOLUTION PREFILLED SYRINGE: Performed by: NURSE PRACTITIONER

## 2024-10-30 RX ORDER — AZITHROMYCIN 250 MG/1
TABLET, FILM COATED ORAL
Qty: 6 TABLET | Refills: 0 | Status: SHIPPED | OUTPATIENT
Start: 2024-10-30

## 2024-10-30 RX ADMIN — DENOSUMAB 60 MG: 60 INJECTION SUBCUTANEOUS at 10:14

## 2024-12-06 DIAGNOSIS — F41.1 GAD (GENERALIZED ANXIETY DISORDER): ICD-10-CM

## 2024-12-06 DIAGNOSIS — I10 ESSENTIAL HYPERTENSION: ICD-10-CM

## 2024-12-06 RX ORDER — AMLODIPINE BESYLATE 2.5 MG/1
2.5 TABLET ORAL DAILY
Qty: 90 TABLET | Refills: 0 | OUTPATIENT
Start: 2024-12-06

## 2025-04-17 ENCOUNTER — TELEPHONE (OUTPATIENT)
Dept: FAMILY MEDICINE CLINIC | Facility: CLINIC | Age: 80
End: 2025-04-17
Payer: MEDICARE

## 2025-04-17 PROCEDURE — 87077 CULTURE AEROBIC IDENTIFY: CPT

## 2025-04-17 PROCEDURE — 87086 URINE CULTURE/COLONY COUNT: CPT

## 2025-04-17 PROCEDURE — 87186 SC STD MICRODIL/AGAR DIL: CPT

## 2025-04-17 NOTE — TELEPHONE ENCOUNTER
Name: Yeimy Yang    Relationship: Self    Best Callback Number:     [phone]        [facility]UB PROVIDED THE RELAY MESSAGE FROM THE OFFICE   PATIENT SCHEDULED AS REQUESTED

## 2025-04-17 NOTE — TELEPHONE ENCOUNTER
"Relay     \"[facility] APPT TO 4/21/25 PROVIDER OUT OF OFFICE ON  4/28/25\"                "

## 2025-04-19 ENCOUNTER — RESULTS FOLLOW-UP (OUTPATIENT)
Dept: URGENT CARE | Facility: CLINIC | Age: 80
End: 2025-04-19
Payer: MEDICARE

## 2025-04-19 DIAGNOSIS — N39.0 E-COLI UTI: Primary | ICD-10-CM

## 2025-04-19 DIAGNOSIS — B96.20 E-COLI UTI: Primary | ICD-10-CM

## 2025-04-19 RX ORDER — NITROFURANTOIN 25; 75 MG/1; MG/1
100 CAPSULE ORAL 2 TIMES DAILY
Qty: 14 CAPSULE | Refills: 0 | Status: CANCELLED | OUTPATIENT
Start: 2025-04-19 | End: 2025-04-26

## 2025-04-21 ENCOUNTER — OFFICE VISIT (OUTPATIENT)
Dept: FAMILY MEDICINE CLINIC | Facility: CLINIC | Age: 80
End: 2025-04-21
Payer: MEDICARE

## 2025-04-21 VITALS
SYSTOLIC BLOOD PRESSURE: 127 MMHG | HEIGHT: 62 IN | TEMPERATURE: 96.7 F | OXYGEN SATURATION: 96 % | BODY MASS INDEX: 33.75 KG/M2 | RESPIRATION RATE: 18 BRPM | DIASTOLIC BLOOD PRESSURE: 78 MMHG | WEIGHT: 183.4 LBS | HEART RATE: 71 BPM

## 2025-04-21 DIAGNOSIS — F41.1 GAD (GENERALIZED ANXIETY DISORDER): ICD-10-CM

## 2025-04-21 DIAGNOSIS — J30.89 ALLERGIC RHINITIS DUE TO OTHER ALLERGIC TRIGGER, UNSPECIFIED SEASONALITY: ICD-10-CM

## 2025-04-21 DIAGNOSIS — E78.2 MIXED HYPERLIPIDEMIA: ICD-10-CM

## 2025-04-21 DIAGNOSIS — R05.1 ACUTE COUGH: Primary | ICD-10-CM

## 2025-04-21 DIAGNOSIS — E55.9 VITAMIN D DEFICIENCY: ICD-10-CM

## 2025-04-21 DIAGNOSIS — I10 ESSENTIAL HYPERTENSION: ICD-10-CM

## 2025-04-21 PROCEDURE — 3078F DIAST BP <80 MM HG: CPT | Performed by: NURSE PRACTITIONER

## 2025-04-21 PROCEDURE — 87428 SARSCOV & INF VIR A&B AG IA: CPT | Performed by: NURSE PRACTITIONER

## 2025-04-21 PROCEDURE — 1126F AMNT PAIN NOTED NONE PRSNT: CPT | Performed by: NURSE PRACTITIONER

## 2025-04-21 PROCEDURE — 1159F MED LIST DOCD IN RCRD: CPT | Performed by: NURSE PRACTITIONER

## 2025-04-21 PROCEDURE — 3074F SYST BP LT 130 MM HG: CPT | Performed by: NURSE PRACTITIONER

## 2025-04-21 PROCEDURE — 1160F RVW MEDS BY RX/DR IN RCRD: CPT | Performed by: NURSE PRACTITIONER

## 2025-04-21 PROCEDURE — 99214 OFFICE O/P EST MOD 30 MIN: CPT | Performed by: NURSE PRACTITIONER

## 2025-04-21 RX ORDER — PROPRANOLOL HCL 20 MG
20 TABLET ORAL 2 TIMES DAILY
Qty: 180 TABLET | Refills: 1 | Status: SHIPPED | OUTPATIENT
Start: 2025-04-21

## 2025-04-21 RX ORDER — MONTELUKAST SODIUM 10 MG/1
10 TABLET ORAL NIGHTLY
Qty: 90 TABLET | Refills: 1 | Status: SHIPPED | OUTPATIENT
Start: 2025-04-21

## 2025-04-21 RX ORDER — AMLODIPINE BESYLATE 2.5 MG/1
2.5 TABLET ORAL DAILY
Qty: 90 TABLET | Refills: 1 | Status: SHIPPED | OUTPATIENT
Start: 2025-04-21

## 2025-04-21 RX ORDER — LOSARTAN POTASSIUM 50 MG/1
50 TABLET ORAL 2 TIMES DAILY
Qty: 180 TABLET | Refills: 1 | Status: SHIPPED | OUTPATIENT
Start: 2025-04-21

## 2025-04-21 RX ORDER — BROMPHENIRAMINE MALEATE, PSEUDOEPHEDRINE HYDROCHLORIDE, AND DEXTROMETHORPHAN HYDROBROMIDE 2; 30; 10 MG/5ML; MG/5ML; MG/5ML
5 SYRUP ORAL 4 TIMES DAILY PRN
Qty: 240 ML | Refills: 0 | Status: SHIPPED | OUTPATIENT
Start: 2025-04-21

## 2025-04-21 RX ORDER — ATORVASTATIN CALCIUM 20 MG/1
20 TABLET, FILM COATED ORAL DAILY
Qty: 90 TABLET | Refills: 1 | Status: SHIPPED | OUTPATIENT
Start: 2025-04-21

## 2025-04-21 RX ORDER — CHOLECALCIFEROL (VITAMIN D3) 25 MCG
1000 TABLET ORAL DAILY
Qty: 90 TABLET | Refills: 1 | Status: SHIPPED | OUTPATIENT
Start: 2025-04-21

## 2025-04-21 RX ORDER — CETIRIZINE HYDROCHLORIDE 10 MG/1
10 TABLET ORAL DAILY
Qty: 90 TABLET | Refills: 1 | Status: SHIPPED | OUTPATIENT
Start: 2025-04-21

## 2025-04-21 NOTE — ASSESSMENT & PLAN NOTE
Orders:    amLODIPine (NORVASC) 2.5 MG tablet; Take 1 tablet by mouth Daily.    losartan (COZAAR) 50 MG tablet; Take 1 tablet by mouth 2 (Two) Times a Day.    propranolol (INDERAL) 20 MG tablet; Take 1 tablet by mouth 2 (Two) Times a Day.    Comprehensive Metabolic Panel; Future    CBC & Differential; Future    TSH; Future    Lipid Panel; Future

## 2025-04-21 NOTE — ASSESSMENT & PLAN NOTE
Orders:    atorvastatin (LIPITOR) 20 MG tablet; Take 1 tablet by mouth Daily.    Comprehensive Metabolic Panel; Future    CBC & Differential; Future    TSH; Future    Lipid Panel; Future

## 2025-04-21 NOTE — ASSESSMENT & PLAN NOTE
Orders:    cetirizine (EQ Allergy Relief, Cetirizine,) 10 MG tablet; Take 1 tablet by mouth Daily.    montelukast (SINGULAIR) 10 MG tablet; Take 1 tablet by mouth Every Night.

## 2025-04-21 NOTE — PROGRESS NOTES
Chief Complaint  Hypertension and Hyperlipidemia    Subjective          Yeimy Yang presents to [facility]  History of Present Illness    History of Present Illness  The patient presents for evaluation of cough, urinary tract infection (UTI), and cerumen impaction.    She returned from [place] on 04/01/2025 and began experiencing symptoms of illness last week. Increased thirst was noted, and a UTI was diagnosed. Medication was switched from Bactrim to Macrobid, which is currently being taken. No fever is reported, but head congestion and a productive cough are present. Tessalon was prescribed for the cough but was not found to be beneficial.    Ear cleaning does not involve the use of Q-tips, and no issues with the left ear, including blockage sensation, are reported. Earwax removal in [place] involved the use of peroxide, which did not cause discomfort.    Blood pressure was elevated on Thursday, and she is on losartan.  Patient is here for all of her refills.  She is currently taking her amlodipine losartan Inderal.  She is also taking her atorvastatin daily.  She is doing her Zoloft and her Singulair.  She did just get back from their [place] home April 1.  She is currently on antibiotics for UTI    Patient or patient representative verbalized consent for the use of Ambient Listening during the visit with  SERAFIN Gross for chart documentation. 4/21/2025  10:58 EDT      Depression: Not at risk (4/21/2025)    PHQ-2    • PHQ-2 Score: 0    and                Allergies  Patient has no known allergies.    Social History     Tobacco Use   • Smoking status: Never   • Smokeless tobacco: Never   Vaping Use   • Vaping status: Never Used   Substance Use Topics   • Alcohol use: Never   • Drug use: Never       Family History   Problem Relation Age of Onset   • Heart disease Mother    • Arthritis Mother    • Heart disease Father    • Cancer Brother    • Lung cancer Other   "  • Malig Hyperthermia Neg Hx         Health Maintenance Due   Topic Date Due   • COVID-19 Vaccine (7 - 2024-25 season) 09/01/2024        Immunization History   Administered Date(s) Administered   • COVID-19 (MODERNA) 1st,2nd,3rd Dose Monovalent 04/13/2021, 05/11/2021, 11/11/2021   • COVID-19 (MODERNA) BIVALENT 12+YRS 09/30/2022   • COVID-19 (MODERNA) Monovalent Original Booster 11/21/2021   • COVID-19 (PFIZER) BIVALENT 12+YRS 04/12/2023   • Fluzone High-Dose 65+YRS 11/22/2019, 10/07/2020, 11/18/2024   • Fluzone High-Dose 65+yrs 10/08/2021, 09/30/2022, 09/28/2023   • Influenza, Unspecified 10/09/2020   • Pneumococcal Conjugate 13-Valent (PCV13) 01/27/2016   • Pneumococcal Polysaccharide (PPSV23) 10/27/2021   • Shingrix 05/13/2019, 07/18/2019   • TD Preservative Free (Tenivac) 07/09/2019   • Td (TDVAX) 12/16/1996   • Td, Unspecified 12/16/1996   • Tdap 06/13/2012   • Zostavax 10/17/2013   • Zoster, Unspecified 10/17/2013, 05/13/2019, 07/22/2019       Review of Systems   Constitutional:  Negative for chills and fever.   HENT:  Positive for congestion, postnasal drip, rhinorrhea, sinus pressure and sore throat.    Respiratory:  Positive for cough. Negative for shortness of breath.    Gastrointestinal:  Negative for diarrhea, nausea and vomiting.   Skin:  Negative for rash.        Objective       Vitals:    04/21/25 1028   BP: 127/78   Pulse: 71   Resp: 18   Temp: 96.7 °F (35.9 °C)   SpO2: 96%   Weight: 83.2 kg (183 lb 6.4 oz)   Height: 156.2 cm (61.5\")       Body mass index is 34.09 kg/m².         Physical Exam  Vitals reviewed.   Constitutional:       Appearance: Normal appearance. She is well-developed.   HENT:      Right Ear: Tympanic membrane and ear canal normal.      Left Ear: Tympanic membrane and ear canal normal.      Nose: Congestion and rhinorrhea present.      Mouth/Throat:      Pharynx: Posterior oropharyngeal erythema present. No oropharyngeal exudate.   Cardiovascular:      Rate and Rhythm: Normal rate " and regular rhythm.      Heart sounds: Normal heart sounds. No murmur heard.  Pulmonary:      Effort: Pulmonary effort is normal.      Breath sounds: Normal breath sounds.   Neurological:      Mental Status: She is alert and oriented to person, place, and time.      Cranial Nerves: No cranial nerve deficit.      Motor: No weakness.   Psychiatric:         Mood and Affect: Mood and affect normal.         Physical Exam  Ears: Small amount of earwax in the left ear, not blocking the ear canal.  Mouth/Throat: Significant postnasal drainage noted.  Respiratory: Clear to auscultation, no wheezing, rales or rhonchi.              Result Review :     The following data was reviewed by: SERAFIN Gross on 04/21/2025:    Common Labs   Common labs          10/29/2024    08:51   Common Labs   Glucose 113    BUN 17    Creatinine 0.73    Sodium 141    Potassium 4.1    Chloride 106    Calcium 9.3    Albumin 4.2    Total Bilirubin 0.5    Alkaline Phosphatase 114    AST (SGOT) 19    ALT (SGPT) 15    WBC 11.53    Hemoglobin 13.4    Hematocrit 40.6    Platelets 259    Total Cholesterol 153    Triglycerides 87    HDL Cholesterol 49    LDL Cholesterol  88            No Images in the past 120 days found..         Results  Diagnostic Testing   - COVID-19 test: Negative   - Influenza test: Negative                    Assessment and Plan      Assessment & Plan  Essential hypertension      Orders:  •  amLODIPine (NORVASC) 2.5 MG tablet; Take 1 tablet by mouth Daily.  •  losartan (COZAAR) 50 MG tablet; Take 1 tablet by mouth 2 (Two) Times a Day.  •  propranolol (INDERAL) 20 MG tablet; Take 1 tablet by mouth 2 (Two) Times a Day.  •  Comprehensive Metabolic Panel; Future  •  CBC & Differential; Future  •  TSH; Future  •  Lipid Panel; Future    Mixed hyperlipidemia       Orders:  •  atorvastatin (LIPITOR) 20 MG tablet; Take 1 tablet by mouth Daily.  •  Comprehensive Metabolic Panel; Future  •  CBC & Differential; Future  •  TSH;  Future  •  Lipid Panel; Future    Allergic rhinitis due to other allergic trigger, unspecified seasonality    Orders:  •  cetirizine (EQ Allergy Relief, Cetirizine,) 10 MG tablet; Take 1 tablet by mouth Daily.  •  montelukast (SINGULAIR) 10 MG tablet; Take 1 tablet by mouth Every Night.    Vitamin D deficiency    Orders:  •  cholecalciferol (VITAMIN D3) 25 MCG (1000 UT) tablet; Take 1 tablet by mouth Daily.    MICHI (generalized anxiety disorder)      Orders:  •  propranolol (INDERAL) 20 MG tablet; Take 1 tablet by mouth 2 (Two) Times a Day.  •  sertraline (ZOLOFT) 50 MG tablet; Take 1 tablet by mouth Every Morning.    Acute cough    Orders:  •  POCT SARS-CoV-2 Antigen JULIANE + Flu  •  brompheniramine-pseudoephedrine-DM 30-2-10 MG/5ML syrup; Take 5 mL by mouth 4 (Four) Times a Day As Needed for Congestion or Cough.       Diagnosis Plan   1. Acute cough  POCT SARS-CoV-2 Antigen JULIANE + Flu    brompheniramine-pseudoephedrine-DM 30-2-10 MG/5ML syrup      2. Essential hypertension  amLODIPine (NORVASC) 2.5 MG tablet    losartan (COZAAR) 50 MG tablet    propranolol (INDERAL) 20 MG tablet    Comprehensive Metabolic Panel    CBC & Differential    TSH    Lipid Panel      3. Mixed hyperlipidemia  atorvastatin (LIPITOR) 20 MG tablet    Comprehensive Metabolic Panel    CBC & Differential    TSH    Lipid Panel      4. Allergic rhinitis due to other allergic trigger, unspecified seasonality  cetirizine (EQ Allergy Relief, Cetirizine,) 10 MG tablet    montelukast (SINGULAIR) 10 MG tablet      5. Vitamin D deficiency  cholecalciferol (VITAMIN D3) 25 MCG (1000 UT) tablet      6. MICHI (generalized anxiety disorder)  propranolol (INDERAL) 20 MG tablet    sertraline (ZOLOFT) 50 MG tablet            Assessment & Plan  1. Cough.  Her COVID-19 and influenza tests have returned negative results.  A prescription for a cough suppressant containing Sudafed will be provided, to be taken four times daily as needed.  She has been informed that this  medication may induce slight tremors.  If her condition improves after a few days, she may discontinue the medication.    2. Urinary Tract Infection.  She is currently on Macrobid for her UTI.  She will continue with Macrobid and discontinue Bactrim.  Macrobid does not cover respiratory infections, so further treatment will depend on test results.    3. Cerumen Impaction.  There is a small amount of earwax in her left ear, which is not causing any blockage at present.  She has been advised to report any sensation of blockage or echoing in her ear.  Warm water and diluted peroxide can be used for earwax removal if needed.    4. Blood pressure management.  Her blood pressure is well-controlled at 127/78 today.  Refills for her medications, including Zoloft, propranolol, Singulair, losartan, and vitamin D, will be provided.  No changes were made to her medication regimen by Dr. Correa.    5. Health Maintenance.  Blood work will be ordered as she is due for it.  She can visit the lab at [facility] or the one at the [facility], whichever is more convenient.  Fasting is required for the blood work.          Follow Up     No follow-ups on file.    Patient was given instructions and counseling regarding her condition or for health maintenance advice. Please see specific information pulled into the AVS if appropriate.     Parts of this note are electronic transcriptions/translations of spoken language to printed text using the Dragon Dictation system.          Anna Caldwell, APRN  04/21/2025

## 2025-04-22 ENCOUNTER — LAB (OUTPATIENT)
Facility: HOSPITAL | Age: 80
End: 2025-04-22
Payer: MEDICARE

## 2025-04-22 DIAGNOSIS — E78.2 MIXED HYPERLIPIDEMIA: ICD-10-CM

## 2025-04-22 DIAGNOSIS — I10 ESSENTIAL HYPERTENSION: ICD-10-CM

## 2025-04-22 DIAGNOSIS — M85.89 OSTEOPENIA OF MULTIPLE SITES: ICD-10-CM

## 2025-04-22 LAB
25(OH)D3 SERPL-MCNC: 52.4 NG/ML (ref 30–100)
ALBUMIN SERPL-MCNC: 4.5 G/DL (ref 3.5–5.2)
ALBUMIN/GLOB SERPL: 1.4 G/DL
ALP SERPL-CCNC: 95 U/L (ref 39–117)
ALT SERPL W P-5'-P-CCNC: 24 U/L (ref 1–33)
ANION GAP SERPL CALCULATED.3IONS-SCNC: 13.9 MMOL/L (ref 5–15)
AST SERPL-CCNC: 27 U/L (ref 1–32)
BASOPHILS # BLD AUTO: 0.04 10*3/MM3 (ref 0–0.2)
BASOPHILS NFR BLD AUTO: 0.4 % (ref 0–1.5)
BILIRUB SERPL-MCNC: 0.6 MG/DL (ref 0–1.2)
BUN SERPL-MCNC: 15 MG/DL (ref 8–23)
BUN/CREAT SERPL: 20 (ref 7–25)
CALCIUM SPEC-SCNC: 9.5 MG/DL (ref 8.6–10.5)
CHLORIDE SERPL-SCNC: 99 MMOL/L (ref 98–107)
CHOLEST SERPL-MCNC: 171 MG/DL (ref 0–200)
CO2 SERPL-SCNC: 22.1 MMOL/L (ref 22–29)
CREAT SERPL-MCNC: 0.75 MG/DL (ref 0.57–1)
DEPRECATED RDW RBC AUTO: 41.8 FL (ref 37–54)
EGFRCR SERPLBLD CKD-EPI 2021: 80.6 ML/MIN/1.73
EOSINOPHIL # BLD AUTO: 0.48 10*3/MM3 (ref 0–0.4)
EOSINOPHIL NFR BLD AUTO: 5.1 % (ref 0.3–6.2)
ERYTHROCYTE [DISTWIDTH] IN BLOOD BY AUTOMATED COUNT: 13.5 % (ref 12.3–15.4)
GLOBULIN UR ELPH-MCNC: 3.2 GM/DL
GLUCOSE SERPL-MCNC: 125 MG/DL (ref 65–99)
HCT VFR BLD AUTO: 41.7 % (ref 34–46.6)
HDLC SERPL-MCNC: 49 MG/DL (ref 40–60)
HGB BLD-MCNC: 14 G/DL (ref 12–15.9)
IMM GRANULOCYTES # BLD AUTO: 0.05 10*3/MM3 (ref 0–0.05)
IMM GRANULOCYTES NFR BLD AUTO: 0.5 % (ref 0–0.5)
LDLC SERPL CALC-MCNC: 99 MG/DL (ref 0–100)
LDLC/HDLC SERPL: 1.96 {RATIO}
LYMPHOCYTES # BLD AUTO: 2.23 10*3/MM3 (ref 0.7–3.1)
LYMPHOCYTES NFR BLD AUTO: 23.9 % (ref 19.6–45.3)
MAGNESIUM SERPL-MCNC: 2.1 MG/DL (ref 1.6–2.4)
MCH RBC QN AUTO: 28.7 PG (ref 26.6–33)
MCHC RBC AUTO-ENTMCNC: 33.6 G/DL (ref 31.5–35.7)
MCV RBC AUTO: 85.5 FL (ref 79–97)
MONOCYTES # BLD AUTO: 0.55 10*3/MM3 (ref 0.1–0.9)
MONOCYTES NFR BLD AUTO: 5.9 % (ref 5–12)
NEUTROPHILS NFR BLD AUTO: 6 10*3/MM3 (ref 1.7–7)
NEUTROPHILS NFR BLD AUTO: 64.2 % (ref 42.7–76)
NRBC BLD AUTO-RTO: 0 /100 WBC (ref 0–0.2)
PHOSPHATE SERPL-MCNC: 2.8 MG/DL (ref 2.5–4.5)
PLATELET # BLD AUTO: 282 10*3/MM3 (ref 140–450)
PMV BLD AUTO: 10.6 FL (ref 6–12)
POTASSIUM SERPL-SCNC: 4.1 MMOL/L (ref 3.5–5.2)
PROT SERPL-MCNC: 7.7 G/DL (ref 6–8.5)
RBC # BLD AUTO: 4.88 10*6/MM3 (ref 3.77–5.28)
SODIUM SERPL-SCNC: 135 MMOL/L (ref 136–145)
TRIGL SERPL-MCNC: 129 MG/DL (ref 0–150)
TSH SERPL DL<=0.05 MIU/L-ACNC: 3.62 UIU/ML (ref 0.27–4.2)
VLDLC SERPL-MCNC: 23 MG/DL (ref 5–40)
WBC NRBC COR # BLD AUTO: 9.35 10*3/MM3 (ref 3.4–10.8)

## 2025-04-22 PROCEDURE — 80053 COMPREHEN METABOLIC PANEL: CPT

## 2025-04-22 PROCEDURE — 84443 ASSAY THYROID STIM HORMONE: CPT

## 2025-04-22 PROCEDURE — 80061 LIPID PANEL: CPT

## 2025-04-22 PROCEDURE — 36415 COLL VENOUS BLD VENIPUNCTURE: CPT

## 2025-04-22 PROCEDURE — 84100 ASSAY OF PHOSPHORUS: CPT

## 2025-04-22 PROCEDURE — 83735 ASSAY OF MAGNESIUM: CPT

## 2025-04-22 PROCEDURE — 85025 COMPLETE CBC W/AUTO DIFF WBC: CPT

## 2025-04-22 PROCEDURE — 82306 VITAMIN D 25 HYDROXY: CPT

## 2025-04-23 ENCOUNTER — RESULTS FOLLOW-UP (OUTPATIENT)
Dept: FAMILY MEDICINE CLINIC | Facility: CLINIC | Age: 80
End: 2025-04-23
Payer: MEDICARE

## 2025-05-02 ENCOUNTER — PATIENT ROUNDING (BHMG ONLY) (OUTPATIENT)
Dept: FAMILY MEDICINE CLINIC | Facility: CLINIC | Age: 80
End: 2025-05-02
Payer: MEDICARE

## 2025-05-02 DIAGNOSIS — E78.2 MIXED HYPERLIPIDEMIA: ICD-10-CM

## 2025-05-02 DIAGNOSIS — I10 ESSENTIAL HYPERTENSION: ICD-10-CM

## 2025-05-02 RX ORDER — ATORVASTATIN CALCIUM 20 MG/1
20 TABLET, FILM COATED ORAL DAILY
Qty: 90 TABLET | Refills: 0 | OUTPATIENT
Start: 2025-05-02

## 2025-05-02 RX ORDER — LOSARTAN POTASSIUM 50 MG/1
50 TABLET ORAL 2 TIMES DAILY
Qty: 180 TABLET | Refills: 0 | OUTPATIENT
Start: 2025-05-02

## 2025-06-14 DIAGNOSIS — F41.1 GAD (GENERALIZED ANXIETY DISORDER): ICD-10-CM

## 2025-06-21 DIAGNOSIS — I10 ESSENTIAL HYPERTENSION: ICD-10-CM

## 2025-06-23 RX ORDER — AMLODIPINE BESYLATE 2.5 MG/1
2.5 TABLET ORAL DAILY
Qty: 90 TABLET | Refills: 0 | OUTPATIENT
Start: 2025-06-23

## 2025-08-22 ENCOUNTER — OFFICE VISIT (OUTPATIENT)
Age: 80
End: 2025-08-22
Payer: MEDICARE

## 2025-08-22 ENCOUNTER — PATIENT ROUNDING (BHMG ONLY) (OUTPATIENT)
Age: 80
End: 2025-08-22
Payer: MEDICARE

## 2025-08-22 VITALS
WEIGHT: 180 LBS | SYSTOLIC BLOOD PRESSURE: 140 MMHG | HEART RATE: 75 BPM | OXYGEN SATURATION: 96 % | HEIGHT: 61 IN | DIASTOLIC BLOOD PRESSURE: 86 MMHG | BODY MASS INDEX: 33.99 KG/M2

## 2025-08-22 DIAGNOSIS — I10 ESSENTIAL HYPERTENSION: ICD-10-CM

## 2025-08-22 DIAGNOSIS — J30.1 SEASONAL ALLERGIC RHINITIS DUE TO POLLEN: ICD-10-CM

## 2025-08-22 DIAGNOSIS — Z76.89 ENCOUNTER TO ESTABLISH CARE: Primary | ICD-10-CM

## 2025-08-22 DIAGNOSIS — E78.2 MIXED HYPERLIPIDEMIA: ICD-10-CM

## 2025-08-22 DIAGNOSIS — Z13.1 DIABETES MELLITUS SCREENING: ICD-10-CM

## 2025-08-22 DIAGNOSIS — E55.9 VITAMIN D DEFICIENCY: ICD-10-CM

## 2025-08-29 ENCOUNTER — LAB (OUTPATIENT)
Facility: HOSPITAL | Age: 80
End: 2025-08-29
Payer: MEDICARE

## 2025-08-29 DIAGNOSIS — Z76.89 ENCOUNTER TO ESTABLISH CARE: ICD-10-CM

## 2025-08-29 DIAGNOSIS — Z13.1 DIABETES MELLITUS SCREENING: ICD-10-CM

## 2025-08-29 DIAGNOSIS — J30.1 SEASONAL ALLERGIC RHINITIS DUE TO POLLEN: ICD-10-CM

## 2025-08-29 DIAGNOSIS — E78.2 MIXED HYPERLIPIDEMIA: ICD-10-CM

## 2025-08-29 DIAGNOSIS — E55.9 VITAMIN D DEFICIENCY: ICD-10-CM

## 2025-08-29 DIAGNOSIS — I10 ESSENTIAL HYPERTENSION: ICD-10-CM

## 2025-08-29 LAB — HBA1C MFR BLD: 5.7 % (ref 4.8–5.6)

## 2025-08-29 PROCEDURE — 36415 COLL VENOUS BLD VENIPUNCTURE: CPT

## 2025-08-29 PROCEDURE — 83036 HEMOGLOBIN GLYCOSYLATED A1C: CPT

## (undated) DEVICE — NDL HYPO ECLPS SFTY 18G 1 1/2IN

## (undated) DEVICE — PULLOVER TOGA, 2X LARGE: Brand: FLYTE, SURGICOOL

## (undated) DEVICE — SLV SCD LEG COMFORT KENDALLSCD MD REPROC

## (undated) DEVICE — FAN SPRAY KIT: Brand: PULSAVAC®

## (undated) DEVICE — TOWEL,OR,DSP,ST,BLUE,STD,4/PK,20PK/CS: Brand: MEDLINE

## (undated) DEVICE — NO-SCRATCH ™ SMALL WHITNEY CURETTE ™ IS A SINGLE-USE, PLASTIC CURETTE FOR QUICKLY APPLYING, MANIPULATING AND REMOVING BONE CEMENT DURING HIP AND KNEE REPLACEMENT SURGERY. THE PLASTIC IS SOFTER THAN STEEL INSTRUMENTS, REDUCING THE RISK OF DAMAGING THE PROSTHESIS WITH METAL INSTRUMENTS.  THE CURETTE’S 6MM TIP REMOVES EXCESS CEMENT FROM REPLACEMENT HIPS AND KNEES. EASY-TO-MANEUVER, THE SMALL BLUE CURETTE LETS YOU REMOVE CEMENT FROM ALL EDGES OF THE PROSTHESIS.NO-SCRATCH WHITNEY SMALL CURETTE FEATURES:SAFER THAN STEEL- MADE OF PLASTIC - STURDY YET SOFTER THAN SURGICAL STEEL.HANDIER- EACH TOOL HAS A MOLDED-IN THUMB INDENTATION INSTANTLY ORIENTING THE TOOL.- EASIER TO MANEUVER IN HARD TO SEE PLACES.- COLOR-CODED FOR EASY IDENTIFICATION.FASTER- COMES INDIVIDUALLY PACKAGED IN STERILE, PEEL OPEN POUCH, READY TO GO.- APPLIES, MANIPULATES, OR REMOVES CEMENT WITH FINGERTIP PRECISION.ECONOMICAL- THE COST OF A SINGLE REVISION DWARFS THE COST OF A SINGLE-USE CURETTE. - DISPOSABLE – THERE’S NO NEED TO WASTE TIME REMOVING HARDENED CEMENT OR RE-STERILIZING TOOLS.- LESS EXPENSIVE TO BUY AND INVENTORY - ORDER ONLY THE TOOL YOU USE.- PACKAGED 25 INDIVIDUALLY WRAPPED TOOLS TO A CARTON FOR CONVENIENT SHELF STORAGE.: Brand: WHITNEY NO-SCRATCH CURETTE (SMALL)

## (undated) DEVICE — SYR LUERLOK 30CC

## (undated) DEVICE — 3 BONE CEMENT MIXER: Brand: MIXEVAC

## (undated) DEVICE — SOL IRR NACL 0.9PCT 3000ML

## (undated) DEVICE — SUT VIC 2/0 CT1 36IN

## (undated) DEVICE — GLV SURG NEOPRN SENSICARE PF SZ/7 LF

## (undated) DEVICE — ANTIBACTERIAL VIOLET BRAIDED (POLYGLACTIN 910), SYNTHETIC ABSORBABLE SURGICAL SUTURE: Brand: COATED VICRYL

## (undated) DEVICE — MAT FLR ABS W/BLU/LINER 56X72IN WHT

## (undated) DEVICE — CVR LEG BOOTLEG F/R NOSKID 33IN

## (undated) DEVICE — GLV SURG SENSICARE SLT PF LF 7 STRL

## (undated) DEVICE — DISPOSABLE TOURNIQUET CUFF SINGLE BLADDER, SINGLE PORT AND QUICK CONNECT CONNECTOR: Brand: COLOR CUFF

## (undated) DEVICE — BNDG COTN/ELAS FLEXMASTER DBL/LENGTH CLIP/CLS 6IN 11YD

## (undated) DEVICE — APPL CHLORAPREP HI/LITE 26ML ORNG

## (undated) DEVICE — TOTAL KNEE-LF: Brand: MEDLINE INDUSTRIES, INC.

## (undated) DEVICE — SNAR E/S POLYP SNAREMASTER OVL/10MM 2.8X2300MM YEL

## (undated) DEVICE — UNDERCAST PADDING: Brand: DEROYAL

## (undated) DEVICE — GLV SURG SENSICARE PI PF LF 7 GRN STRL

## (undated) DEVICE — 1010 S-DRAPE TOWEL DRAPE 10/BX: Brand: STERI-DRAPE™

## (undated) DEVICE — GLV SURG BIOGEL LTX PF 8 1/2

## (undated) DEVICE — GLV SURG SENSICARE SLT PF LF 6.5 STRL

## (undated) DEVICE — GAUZE,SPONGE,4"X4",16PLY,STRL,LF,10/TRAY: Brand: MEDLINE

## (undated) DEVICE — COLON KIT: Brand: MEDLINE INDUSTRIES, INC.

## (undated) DEVICE — GLV SURG SENSICARE SLT PF LF 8.5 STRL

## (undated) DEVICE — GLV SURG SENSICARE PI LF PF 7.0

## (undated) DEVICE — Device: Brand: DEFENDO AIR/WATER/SUCTION AND BIOPSY VALVE

## (undated) DEVICE — 450 ML BOTTLE OF 0.05% CHLORHEXIDINE GLUCONATE IN 99.95% STERILE WATER FOR IRRIGATION, USP AND APPLICATOR.: Brand: IRRISEPT ANTIMICROBIAL WOUND LAVAGE

## (undated) DEVICE — STRYKER PERFORMANCE SERIES SAGITTAL BLADE: Brand: STRYKER PERFORMANCE SERIES

## (undated) DEVICE — INTENDED FOR TISSUE SEPARATION, AND OTHER PROCEDURES THAT REQUIRE A SHARP SURGICAL BLADE TO PUNCTURE OR CUT.: Brand: BARD-PARKER ® CARBON RIB-BACK BLADES

## (undated) DEVICE — SOL IRRG H2O PL/BG 1000ML STRL

## (undated) DEVICE — PLASMABLADE PS200-040 4.0: Brand: PLASMABLADE™

## (undated) DEVICE — KT SYR GEL ORISE SNGL PK 10ML